# Patient Record
Sex: MALE | Race: WHITE | Employment: FULL TIME | URBAN - METROPOLITAN AREA
[De-identification: names, ages, dates, MRNs, and addresses within clinical notes are randomized per-mention and may not be internally consistent; named-entity substitution may affect disease eponyms.]

---

## 2017-01-03 RX ORDER — HYDROCODONE BITARTRATE AND ACETAMINOPHEN 7.5; 325 MG/1; MG/1
TABLET ORAL
Qty: 180 TABLET | Refills: 0 | Status: SHIPPED | OUTPATIENT
Start: 2017-01-03 | End: 2017-02-02

## 2017-01-03 NOTE — TELEPHONE ENCOUNTER
Signed rx placed at reception desk for pickup. Detailed message left for pt on cell (ok per consent). Pt will  rx.

## 2017-01-03 NOTE — TELEPHONE ENCOUNTER
Last OV 12/6/16, Future Appointments  Date Time Provider Shaggy Pintoisti   1/4/2017 1:40 PM DO KARAN Melvin       Last rx given 12/6/16

## 2017-01-04 ENCOUNTER — OFFICE VISIT (OUTPATIENT)
Dept: NEUROLOGY | Facility: CLINIC | Age: 57
End: 2017-01-04

## 2017-01-04 VITALS
HEIGHT: 73 IN | SYSTOLIC BLOOD PRESSURE: 150 MMHG | DIASTOLIC BLOOD PRESSURE: 70 MMHG | WEIGHT: 242 LBS | BODY MASS INDEX: 32.07 KG/M2 | RESPIRATION RATE: 20 BRPM | HEART RATE: 100 BPM

## 2017-01-04 DIAGNOSIS — G43.519 INTRACTABLE PERSISTENT MIGRAINE AURA WITHOUT CEREBRAL INFARCTION AND WITHOUT STATUS MIGRAINOSUS: Primary | ICD-10-CM

## 2017-01-04 DIAGNOSIS — E34.8 PINEAL GLAND CYST: ICD-10-CM

## 2017-01-04 PROCEDURE — 99244 OFF/OP CNSLTJ NEW/EST MOD 40: CPT | Performed by: OTHER

## 2017-01-04 PROCEDURE — 96372 THER/PROPH/DIAG INJ SC/IM: CPT | Performed by: OTHER

## 2017-01-04 RX ORDER — KETOROLAC TROMETHAMINE 30 MG/ML
30 INJECTION, SOLUTION INTRAMUSCULAR; INTRAVENOUS ONCE
Status: COMPLETED | OUTPATIENT
Start: 2017-01-04 | End: 2017-01-04

## 2017-01-04 RX ORDER — DEXAMETHASONE SODIUM PHOSPHATE 10 MG/ML
10 INJECTION, SOLUTION INTRAMUSCULAR; INTRAVENOUS ONCE
Status: COMPLETED | OUTPATIENT
Start: 2017-01-04 | End: 2017-01-04

## 2017-01-04 RX ADMIN — DEXAMETHASONE SODIUM PHOSPHATE 10 MG: 10 INJECTION, SOLUTION INTRAMUSCULAR; INTRAVENOUS at 14:56:00

## 2017-01-04 RX ADMIN — KETOROLAC TROMETHAMINE 30 MG: 30 INJECTION, SOLUTION INTRAMUSCULAR; INTRAVENOUS at 14:57:00

## 2017-01-04 NOTE — PROGRESS NOTES
Neurology H&P    True Stephanie Patient Status:  No patient class for patient encounter    3/12/1960 MRN [id number]   Location [address] [facility] Attending No att. providers found   [facility] Day #  PCP Cheryl Irizarry DO     Subjective:  True Stephanie is a Take 1 tablet (350 mg total) by mouth 4 (four) times daily as needed for Muscle spasms.  Disp: 120 tablet Rfl: 0   TOPIRAMATE 50 MG Oral Tab Take 3 tablets by mouth  twice a day (Patient taking differently: Take 4 tablets by mouth  twice a day) Disp: 540 ta BLOCK;  Surgeon: Ellis Pinzon MD;  [facility] [address] [facility]      HERNIA SURGERY      OTHER      Comment left knee     OTHER      Comment left ankle     OTHER      Comment vocal cord     CARPAL TUNNEL RELEASE Bilateral sounds    Neurologic:   MENTAL STATUS: alert, ox3, normal attention, language and fund of knowledge.       CRANIAL NERVES II to XII: PERRLA, no ptosis or diplopia, EOM intact, facial sensation intact, strong eye closure and lower facial muscles & jaw muscle PHOSPHATASE       U/L 74   AST (SGOT)      15-41 U/L 31   ALT (SGPT)      17-63 U/L 46   Total Bilirubin      0.1-2.0 mg/dL 0.4   TOTAL PROTEIN      6.1-8.3 g/dL 7.1   Albumin      3.5-4.8 g/dL 3.8   Sodium      136-144 mmol/L 136   Potassium      3. every 4-6 hours for pain. Yesterday he took 10 Fioricet, 6j687ra Naproxen and 6 Norco for pain control. I cautioned him strongly against medication overuse.  He should see me again in 1 month and we will discuss medication adjustment or starting a new daily

## 2017-01-04 NOTE — PATIENT INSTRUCTIONS
Refill policies:    • Allow 2 business days for refills; controlled substances may take longer.   • Contact your pharmacy at least 5 days prior to running out of medication and have them send an electronic request or submit request through the “request re your physician has recommended that you have a procedure or additional testing performed. [facility] will contact your insurance carrier to obtain pre-certification or prior authorization.     Unfortunately, JOEL has seen an increas

## 2017-01-10 ENCOUNTER — OFFICE VISIT (OUTPATIENT)
Dept: SURGERY | Facility: CLINIC | Age: 57
End: 2017-01-10

## 2017-01-10 VITALS — DIASTOLIC BLOOD PRESSURE: 108 MMHG | HEART RATE: 64 BPM | RESPIRATION RATE: 18 BRPM | SYSTOLIC BLOOD PRESSURE: 154 MMHG

## 2017-01-10 DIAGNOSIS — E34.8 PINEAL GLAND CYST: Primary | ICD-10-CM

## 2017-01-10 DIAGNOSIS — R51.9 CHRONIC INTRACTABLE HEADACHE, UNSPECIFIED HEADACHE TYPE: ICD-10-CM

## 2017-01-10 DIAGNOSIS — G89.29 CHRONIC INTRACTABLE HEADACHE, UNSPECIFIED HEADACHE TYPE: ICD-10-CM

## 2017-01-10 PROCEDURE — 99204 OFFICE O/P NEW MOD 45 MIN: CPT | Performed by: NURSE PRACTITIONER

## 2017-01-10 NOTE — PATIENT INSTRUCTIONS
Refill policies:    • Allow 2 business days for refills; controlled substances may take longer.   • Contact your pharmacy at least 5 days prior to running out of medication and have them send an electronic request or submit request through the “request re your physician has recommended that you have a procedure or additional testing performed. [facility] will contact your insurance carrier to obtain pre-certification or prior authorization.     Unfortunately, JOEL has seen an increas

## 2017-01-10 NOTE — PROGRESS NOTES
Patient c/o really bad headaches for the past 20 years. Headaches on and off. Started to get bad headaches again about 1 year ago. Topamax helped, but headaches returned again. Patient completed MRI recently.  Patient stated he had an MRI done at [facility]

## 2017-01-10 NOTE — H&P
Neurosurgery Clinic Visit  1/10/2017    Jay Jay Collazo PCP:  Bibi Zaidi DO    3/12/1960 MRN [id number]       Chief Complaint:  Patient presents with:  Neurologic Problem: NP- pineal gland cyst      HISTORY OF PRESENT ILLNESS:  Jay Jay Collazo is a(n) pain since surgery which is severe and constant. Following his lumbar spine surgery he did develop CSF leak postop and required ×3 blood patch. He reports his current headache being similar to his dural leak headache.   He is taking multiple medication fo Diverticulosis of colon (without mention of hemorrhage)    • Anesthesia complication      wakes up combative, violent according to patient   • Migraines    • Hyperlipidemia          Past Surgical History    BACK SURGERY      INJ PARAVERT F JNT L/S 1 LEV  3 Visual fields are full. Face is symmetrical. Tongue is midline. Uvula and palate elevate symmetrically. Shrug shoulders normally bilaterally. Cranial nerves II-XII are grossly intact. Facial sensation intact. Pronator drift negative.  Finger to nose maxillary sinus mucosal thickening. Otherwise, the visualized paranasal sinuses and mastoid air cells are unremarkable.  The expected major intracranial flow voids are present.      =====  CONCLUSION:     1. No acute intracranial abnormality.   2. Mild scat

## 2017-01-11 ENCOUNTER — TELEPHONE (OUTPATIENT)
Dept: SURGERY | Facility: CLINIC | Age: 57
End: 2017-01-11

## 2017-01-11 RX ORDER — BUTALBITAL, ASPIRIN, AND CAFFEINE 50; 325; 40 MG/1; MG/1; MG/1
CAPSULE ORAL
Qty: 45 CAPSULE | Refills: 0 | Status: SHIPPED | OUTPATIENT
Start: 2017-01-11 | End: 2017-01-25

## 2017-01-11 NOTE — TELEPHONE ENCOUNTER
KING TCB; regarding case discussion with Dr. Mark Gloria. Dr. Mark Gloria would like to see him in the office. No additional imaging at this time. We will determine an office visit. You please get him in ASAP.

## 2017-01-11 NOTE — TELEPHONE ENCOUNTER
Last OV 12/6/16, Future Appointments  Date Time Provider Shaggy Harmon   2/15/2017 2:00 PM Rawland Gitelman, DO [place] [facility] Susannah Bowens       Last rx given 12/23/16

## 2017-01-16 ENCOUNTER — OFFICE VISIT (OUTPATIENT)
Dept: SURGERY | Facility: CLINIC | Age: 57
End: 2017-01-16

## 2017-01-16 VITALS — HEART RATE: 64 BPM | RESPIRATION RATE: 18 BRPM | DIASTOLIC BLOOD PRESSURE: 88 MMHG | SYSTOLIC BLOOD PRESSURE: 144 MMHG

## 2017-01-16 DIAGNOSIS — E34.8 PINEAL GLAND CYST: Primary | ICD-10-CM

## 2017-01-16 PROCEDURE — 99214 OFFICE O/P EST MOD 30 MIN: CPT | Performed by: NEUROLOGICAL SURGERY

## 2017-01-16 NOTE — PATIENT INSTRUCTIONS
Refill policies:    • Allow 2 business days for refills; controlled substances may take longer.   • Contact your pharmacy at least 5 days prior to running out of medication and have them send an electronic request or submit request through the “request re your physician has recommended that you have a procedure or additional testing performed. [facility] will contact your insurance carrier to obtain pre-certification or prior authorization.     Unfortunately, JOEL has seen an increas

## 2017-01-16 NOTE — PROGRESS NOTES
Patient here to discuss MRI and treatment options.  Patient was recently seen on 1/10/17 with JAZ Lynn and it was determined patient has a vascular component to his pineal cyst. Patient states migraine is doing better since he was last seen, but still

## 2017-01-26 RX ORDER — BUTALBITAL, ASPIRIN, AND CAFFEINE 50; 325; 40 MG/1; MG/1; MG/1
CAPSULE ORAL
Qty: 45 CAPSULE | Refills: 0 | Status: SHIPPED | OUTPATIENT
Start: 2017-01-26 | End: 2017-02-06

## 2017-02-02 RX ORDER — HYDROCODONE BITARTRATE AND ACETAMINOPHEN 7.5; 325 MG/1; MG/1
TABLET ORAL
Qty: 180 TABLET | Refills: 0 | Status: SHIPPED | OUTPATIENT
Start: 2017-02-02 | End: 2017-03-02

## 2017-02-02 NOTE — TELEPHONE ENCOUNTER
Last OV 12/6/16, Future Appointments  Date Time Provider Shaggy Harmon   2/13/2017 3:00 PM Shiela Goodpasture, DO [facility] [place]   2/15/2017 2:00 PM DO [place] [facility] Ilana Carreon       Last rx given 1/3/17

## 2017-02-06 RX ORDER — BUTALBITAL, ASPIRIN, AND CAFFEINE 50; 325; 40 MG/1; MG/1; MG/1
CAPSULE ORAL
Qty: 45 CAPSULE | Refills: 0 | Status: SHIPPED | OUTPATIENT
Start: 2017-02-06 | End: 2017-02-25

## 2017-02-06 NOTE — TELEPHONE ENCOUNTER
Last OV 12/6/16, Future Appointments  Date Time Provider Shaggy Harmon   2/13/2017 3:00 PM Caterina Orantes DO [facility] CARE AT [facility] [place]   2/15/2017 2:00 PM DO [place] [facility] Meredith Curtis       Last rx given 1/26/17

## 2017-02-13 ENCOUNTER — OFFICE VISIT (OUTPATIENT)
Dept: SURGERY | Facility: CLINIC | Age: 57
End: 2017-02-13

## 2017-02-13 VITALS — HEART RATE: 72 BPM | RESPIRATION RATE: 14 BRPM | SYSTOLIC BLOOD PRESSURE: 140 MMHG | DIASTOLIC BLOOD PRESSURE: 80 MMHG

## 2017-02-13 DIAGNOSIS — E34.8 PINEAL GLAND CYST: Primary | ICD-10-CM

## 2017-02-13 DIAGNOSIS — R51.9 GENERALIZED HEADACHES: ICD-10-CM

## 2017-02-13 PROCEDURE — 99215 OFFICE O/P EST HI 40 MIN: CPT | Performed by: NEUROLOGICAL SURGERY

## 2017-02-13 NOTE — PATIENT INSTRUCTIONS
Refill policies:    • Allow 2 business days for refills; controlled substances may take longer.   • Contact your pharmacy at least 5 days prior to running out of medication and have them send an electronic request or submit request through the “request re your physician has recommended that you have a procedure or additional testing performed. [facility] will contact your insurance carrier to obtain pre-certification or prior authorization.     Unfortunately, JOEL has seen an increas

## 2017-02-14 ENCOUNTER — TELEPHONE (OUTPATIENT)
Dept: NEUROLOGY | Facility: CLINIC | Age: 57
End: 2017-02-14

## 2017-02-14 NOTE — PROGRESS NOTES
[facility]   Outpatient Neurological Surgery Follow Up    Madelin Mims  : 3/12/1960  2015  PCP: Nicky Mann DO  Referring Provider: No ref. provider found    REASON FOR VISIT:Patient presents with:   Other: follow up for cy semi-centrum ovale. There is no compression of the sylvian aqueduct. No sign of obstructive hydrocephalus.     ASSESSMENT:  (E34.8) Pineal gland cyst  (primary encounter diagnosis)  Plan: MRI PINEAL (W+WO) (CPT=70553)            (R51) Generalized headache

## 2017-02-14 NOTE — TELEPHONE ENCOUNTER
Left message at home number, [address] code [id number] ordered by [facility] has been approved by your insurance, authorization # [id number] and is approved through 02/14/17 to 03/15/17. Please have procedure completed before 03/15/17.  Schedule at your conveni

## 2017-02-15 ENCOUNTER — OFFICE VISIT (OUTPATIENT)
Dept: NEUROLOGY | Facility: CLINIC | Age: 57
End: 2017-02-15

## 2017-02-15 ENCOUNTER — TELEPHONE (OUTPATIENT)
Dept: SURGERY | Facility: CLINIC | Age: 57
End: 2017-02-15

## 2017-02-15 VITALS
HEART RATE: 64 BPM | BODY MASS INDEX: 32 KG/M2 | SYSTOLIC BLOOD PRESSURE: 138 MMHG | DIASTOLIC BLOOD PRESSURE: 88 MMHG | RESPIRATION RATE: 18 BRPM | WEIGHT: 243 LBS

## 2017-02-15 DIAGNOSIS — E34.8 PINEAL GLAND CYST: ICD-10-CM

## 2017-02-15 DIAGNOSIS — G43.519 INTRACTABLE PERSISTENT MIGRAINE AURA WITHOUT CEREBRAL INFARCTION AND WITHOUT STATUS MIGRAINOSUS: Primary | ICD-10-CM

## 2017-02-15 PROCEDURE — 99213 OFFICE O/P EST LOW 20 MIN: CPT | Performed by: OTHER

## 2017-02-15 RX ORDER — VERAPAMIL HYDROCHLORIDE 80 MG/1
TABLET ORAL
Qty: 270 TABLET | Refills: 0 | OUTPATIENT
Start: 2017-02-15

## 2017-02-15 RX ORDER — SUMATRIPTAN 25 MG/1
25 TABLET, FILM COATED ORAL EVERY 2 HOUR PRN
Qty: 9 TABLET | Refills: 0 | Status: SHIPPED | OUTPATIENT
Start: 2017-02-15 | End: 2017-03-14

## 2017-02-15 RX ORDER — VERAPAMIL HYDROCHLORIDE 80 MG/1
80 TABLET ORAL 3 TIMES DAILY
Qty: 90 TABLET | Refills: 0 | Status: SHIPPED | OUTPATIENT
Start: 2017-02-15 | End: 2017-03-14

## 2017-02-15 NOTE — PROGRESS NOTES
Neurology H&P    Rondi Cousins Patient Status:  No patient class for patient encounter    3/12/1960 MRN [id number]   Location [address] [facility] Attending No att. providers found   Hosp Day #  PCP Sandro Kimbrough DO     Subjective:  Initial Clinic HPI intensity. He still has almost daily headache. He states that he has stopped OTC migraine meds for headaches and that this may have helped with his daily headaches. He still takes 1000mg naproxen and 6 tabs Norco per day for back pain.  He has HA pain up to infarction and without status migrainosus      PMHx:  Past Medical History   Diagnosis Date   • OTHER DISEASES 1995     hepatitis drug induced   • Depression    • Neuropathy      left side   • Visual impairment      glasses   • Back problem    • Diverticul unexplained weight loss  Vision: no vision changes, no new blurry or double vision  Head and Neck: no eye or ear discharge  Pulmonary: no SOB, no new cough  CV: no chest pain, no new lower extremity swelling  GI: no constipation or abdominal pain  : ericka 34.1   RDW      11.5-16.0 % 13.2   RDW-SD      35.1-46.3 fL 45.5   Prelim Neutrophil Abs      1.30-6.70 x10 (3) uL 2.69   Neutrophils Absolute      1.30-6.70 x10(3) uL 2.69   Lymphocytes Absolute      0.90-4.00 x10(3) uL 1.39   Monocytes Absolute      0.10 HE has met with Dr. Funk in Neurosurgery for evaluation of his pineal cyst.           Plan:  1. Headache - Likely mixed migraine and medication overuse headache  - strongly urged to only take medications as directed.   - Limit OTC analgesics  - Continue To

## 2017-02-15 NOTE — PATIENT INSTRUCTIONS
Refill policies:    • Allow 2 business days for refills; controlled substances may take longer.   • Contact your pharmacy at least 5 days prior to running out of medication and have them send an electronic request or submit request through the “request re your physician has recommended that you have a procedure or additional testing performed. [facility] will contact your insurance carrier to obtain pre-certification or prior authorization.     Unfortunately, JOEL has seen an increas

## 2017-02-25 NOTE — TELEPHONE ENCOUNTER
Last OV 12/6/16, Future Appointments  Date Time Provider Shaggy Harmon   3/22/2017 2:40 PM DO ROXY Jesus       Last rx given 2/6/17 for # 39 with no refills

## 2017-02-27 RX ORDER — BUTALBITAL, ASPIRIN, AND CAFFEINE 50; 325; 40 MG/1; MG/1; MG/1
CAPSULE ORAL
Qty: 45 CAPSULE | Refills: 0 | Status: SHIPPED | OUTPATIENT
Start: 2017-02-27 | End: 2017-04-10

## 2017-03-02 RX ORDER — HYDROCODONE BITARTRATE AND ACETAMINOPHEN 7.5; 325 MG/1; MG/1
TABLET ORAL
Qty: 180 TABLET | Refills: 0 | Status: SHIPPED | OUTPATIENT
Start: 2017-03-02 | End: 2017-04-03

## 2017-03-13 ENCOUNTER — TELEPHONE (OUTPATIENT)
Dept: FAMILY MEDICINE CLINIC | Facility: CLINIC | Age: 57
End: 2017-03-13

## 2017-03-13 RX ORDER — TOPIRAMATE 50 MG/1
TABLET, FILM COATED ORAL
Qty: 540 TABLET | Refills: 3 | Status: SHIPPED | OUTPATIENT
Start: 2017-03-13 | End: 2017-08-29

## 2017-03-13 RX ORDER — DULOXETIN HYDROCHLORIDE 60 MG/1
CAPSULE, DELAYED RELEASE ORAL
Qty: 90 CAPSULE | Refills: 3 | Status: SHIPPED | OUTPATIENT
Start: 2017-03-13 | End: 2018-01-31

## 2017-03-13 RX ORDER — ROSUVASTATIN CALCIUM 10 MG/1
TABLET, COATED ORAL
Qty: 90 TABLET | Refills: 3 | Status: SHIPPED | OUTPATIENT
Start: 2017-03-13 | End: 2018-02-19

## 2017-03-13 NOTE — TELEPHONE ENCOUNTER
Last OV 12/6/16  Last labs 4/12/16  3 meds last refilled 10/20/16  90 day  0 refills    Patient is now seeing neuro

## 2017-03-13 NOTE — TELEPHONE ENCOUNTER
Last fill 2/27/17 #45 no refill  Called patient to clarify Rx request from [address] [place], [place] [address] for generic butalbital-aspirin- caffeine.   Patient states to disregard the Rx request  Request shredded

## 2017-03-15 RX ORDER — VERAPAMIL HYDROCHLORIDE 80 MG/1
TABLET ORAL
Qty: 90 TABLET | Refills: 0 | Status: SHIPPED | OUTPATIENT
Start: 2017-03-15 | End: 2017-04-20

## 2017-03-15 RX ORDER — SUMATRIPTAN 25 MG/1
TABLET, FILM COATED ORAL
Qty: 9 TABLET | Refills: 2 | Status: SHIPPED | OUTPATIENT
Start: 2017-03-15 | End: 2017-04-26

## 2017-03-15 NOTE — TELEPHONE ENCOUNTER
Medication: Sumatriptan    Date of last refill: 02/15/07  [place]     Last office visit: 2/15/2017  Due back to clinic per last office note:  TN in 6 weeks by 03/29/17  Date next office visit scheduled:  Future Appointme

## 2017-03-15 NOTE — TELEPHONE ENCOUNTER
Medication: Verapamil    Date of last refill: 02/15/07  [place]     Last office visit: 2/15/2017  Due back to clinic per last office note:  TN in 6 weeks by 03/29/17  Date next office visit scheduled:  Future Appointment

## 2017-03-16 RX ORDER — CARISOPRODOL 350 MG/1
350 TABLET ORAL 4 TIMES DAILY PRN
Qty: 120 TABLET | Refills: 0 | Status: SHIPPED
Start: 2017-03-16 | End: 2017-06-22

## 2017-03-16 NOTE — TELEPHONE ENCOUNTER
Spoke with the pt to confirm if he is still taking the soma as we received a request to refill this from [facility]  He states that yes he is still taking this and I asked him how he is taking this and he states 2 in the AM and @ 2 in the PM    [facility] 12/14/16

## 2017-04-03 ENCOUNTER — TELEPHONE (OUTPATIENT)
Dept: FAMILY MEDICINE CLINIC | Facility: CLINIC | Age: 57
End: 2017-04-03

## 2017-04-03 RX ORDER — HYDROCODONE BITARTRATE AND ACETAMINOPHEN 7.5; 325 MG/1; MG/1
TABLET ORAL
Qty: 180 TABLET | Refills: 0 | Status: SHIPPED | OUTPATIENT
Start: 2017-04-03 | End: 2017-04-29

## 2017-04-03 NOTE — TELEPHONE ENCOUNTER
Patient notified via detailed message. Patient instructed that if he is not the one who will be picking up he needs to call back and let our office know. Script placed in book.    Sent to  staff

## 2017-04-03 NOTE — TELEPHONE ENCOUNTER
LOV- 12/6/2016  Last refill-    hydrocodone-acetaminophen 7.5-325 MG Oral Tab 180 tablet 0 3/2/2017      Sig :  1-2 po qid

## 2017-04-10 RX ORDER — BUTALBITAL, ASPIRIN, AND CAFFEINE 50; 325; 40 MG/1; MG/1; MG/1
CAPSULE ORAL
Qty: 45 CAPSULE | Refills: 1 | Status: SHIPPED | OUTPATIENT
Start: 2017-04-10 | End: 2017-06-01

## 2017-04-20 NOTE — TELEPHONE ENCOUNTER
Medication: verapamil     Date of last refill: 3/15/17  [place]     Last office visit: 2/15/17  Due back to clinic per last office note:  6 weeks  Date next office visit scheduled:  4/26/17    Last OV note recommendation

## 2017-04-24 RX ORDER — VERAPAMIL HYDROCHLORIDE 80 MG/1
TABLET ORAL
Qty: 90 TABLET | Refills: 0 | Status: SHIPPED | OUTPATIENT
Start: 2017-04-24 | End: 2017-04-26

## 2017-04-24 NOTE — PROGRESS NOTES
[facility]   Outpatient Neurological Surgery Follow Up    Rebecca Nguyễn  : 3/12/1960  2017  PCP: Dangelo George DO  Referring Provider: No ref.  provider found    REASON FOR VISIT:Patient presents with:  Neurologic Problem: f/u minutes. % of time spent educating: Greater than 50% spent on counseling, education, and coordination of care. Howard Vazquez  2/72/20822:01 PM    Nicky Mann DO

## 2017-04-25 ENCOUNTER — HOSPITAL ENCOUNTER (OUTPATIENT)
Dept: MRI IMAGING | Age: 57
Discharge: HOME | End: 2017-04-25
Attending: NEUROLOGICAL SURGERY
Payer: COMMERCIAL

## 2017-04-25 ENCOUNTER — TELEPHONE (OUTPATIENT)
Dept: NEUROLOGY | Facility: CLINIC | Age: 57
End: 2017-04-25

## 2017-04-25 DIAGNOSIS — E34.8 PINEAL GLAND CYST: ICD-10-CM

## 2017-04-25 PROCEDURE — A9575 INJ GADOTERATE MEGLUMI 0.1ML: HCPCS | Performed by: NEUROLOGICAL SURGERY

## 2017-04-25 PROCEDURE — 70553 MRI BRAIN STEM W/O & W/DYE: CPT

## 2017-04-25 NOTE — TELEPHONE ENCOUNTER
Received refill request for Verapamil. Med was refilled to [facility] 4/24/17. LMTCB patient to see if he wants to switch refill to [facility].

## 2017-04-26 ENCOUNTER — OFFICE VISIT (OUTPATIENT)
Dept: NEUROLOGY | Facility: CLINIC | Age: 57
End: 2017-04-26

## 2017-04-26 VITALS — RESPIRATION RATE: 18 BRPM | SYSTOLIC BLOOD PRESSURE: 138 MMHG | HEART RATE: 60 BPM | DIASTOLIC BLOOD PRESSURE: 80 MMHG

## 2017-04-26 DIAGNOSIS — G43.709 CHRONIC MIGRAINE WITHOUT AURA WITHOUT STATUS MIGRAINOSUS, NOT INTRACTABLE: Primary | ICD-10-CM

## 2017-04-26 PROCEDURE — 99213 OFFICE O/P EST LOW 20 MIN: CPT | Performed by: OTHER

## 2017-04-26 RX ORDER — VERAPAMIL HYDROCHLORIDE 80 MG/1
80 TABLET ORAL 3 TIMES DAILY
Qty: 270 TABLET | Refills: 1 | Status: SHIPPED | OUTPATIENT
Start: 2017-04-26 | End: 2017-07-24

## 2017-04-26 RX ORDER — SUMATRIPTAN 50 MG/1
50 TABLET, FILM COATED ORAL EVERY 2 HOUR PRN
Qty: 12 TABLET | Refills: 0 | Status: SHIPPED | OUTPATIENT
Start: 2017-04-26 | End: 2017-08-09

## 2017-04-26 NOTE — PROGRESS NOTES
Neurology H&P    Rebecca Nguyễn Patient Status:  No patient class for patient encounter    3/12/1960 MRN [id number]   Location [address] [facility] Attending No att. providers found   Hosp Day #  PCP Dangelo George DO     Subjective:  Initial Clinic HPI without a headache. He feels that this may be attributed to weather or possibly hitting his head in the shower recently (no LOC). He would like to continue verapamil at this time. We did discuss Botox today.         Current Medications:    Current Outpatien status migrainosus      PMHx:  Past Medical History   Diagnosis Date   • OTHER DISEASES 1995     hepatitis drug induced   • Depression    • Neuropathy      left side   • Visual impairment      glasses   • Back problem    • Diverticulosis of colon (without loss  Vision: no vision changes, no new blurry or double vision  Head and Neck: no eye or ear discharge  Pulmonary: no SOB, no new cough  CV: no chest pain, no new lower extremity swelling  GI: no constipation or abdominal pain  : denies frequent urinati 48 y/o male with a PMH significant for low back pain and migraine type headache. His exam today is notable for LLE foot drop and leg extension weakness (pain mediated?).  He states that his migraines are much less frequent and intense at this time and he is

## 2017-04-26 NOTE — PROGRESS NOTES
Noticed improvement; however increasing migraines over the past couple weeks. Longest duration is 3 days, which is \"better than before I started seeing him. \" Patient would like to discuss Verapamil.

## 2017-04-26 NOTE — PATIENT INSTRUCTIONS
After your visit at the [place] office  today,  please direct any follow up questions or medication needs to the staff in our [place] office so that your concerns may be promptly addressed.   We are available through Eve or at the numbers below: approval may take 3-10 days. It is highly recommended patients contact their insurance carrier directly to determine coverage. If test is done without insurance authorization, patient may be responsible for the entire amount billed.       Precertification

## 2017-04-29 ENCOUNTER — NURSE ONLY (OUTPATIENT)
Dept: FAMILY MEDICINE CLINIC | Facility: CLINIC | Age: 57
End: 2017-04-29

## 2017-04-29 DIAGNOSIS — Z00.00 ROUTINE HEALTH MAINTENANCE: Primary | ICD-10-CM

## 2017-04-29 PROCEDURE — 84443 ASSAY THYROID STIM HORMONE: CPT | Performed by: FAMILY MEDICINE

## 2017-04-29 PROCEDURE — 80053 COMPREHEN METABOLIC PANEL: CPT | Performed by: FAMILY MEDICINE

## 2017-04-29 PROCEDURE — 84153 ASSAY OF PSA TOTAL: CPT | Performed by: FAMILY MEDICINE

## 2017-04-29 PROCEDURE — 80061 LIPID PANEL: CPT | Performed by: FAMILY MEDICINE

## 2017-04-29 PROCEDURE — 36415 COLL VENOUS BLD VENIPUNCTURE: CPT | Performed by: FAMILY MEDICINE

## 2017-04-29 NOTE — PROGRESS NOTES
Per 4/12/16 result note: \"recall in 1 year lipids/CMP/TSH/PSA\"    Labs ordered    Mint tube drawn right AC x 1 attempt

## 2017-05-01 RX ORDER — HYDROCODONE BITARTRATE AND ACETAMINOPHEN 7.5; 325 MG/1; MG/1
TABLET ORAL
Qty: 180 TABLET | Refills: 0 | Status: SHIPPED | OUTPATIENT
Start: 2017-05-01 | End: 2017-05-30

## 2017-05-05 ENCOUNTER — TELEPHONE (OUTPATIENT)
Dept: FAMILY MEDICINE CLINIC | Facility: CLINIC | Age: 57
End: 2017-05-05

## 2017-05-05 NOTE — TELEPHONE ENCOUNTER
Called and spoke to patient he states symptoms started Wednesday afternoon and has had diarrhea and vomiting since then. Patient states that the vomiting has subsided, but diarrhea has not, he states he is not able to keep anything in.    He states he is

## 2017-05-30 ENCOUNTER — TELEPHONE (OUTPATIENT)
Dept: FAMILY MEDICINE CLINIC | Facility: CLINIC | Age: 57
End: 2017-05-30

## 2017-05-30 DIAGNOSIS — R79.9 ABNORMAL BLOOD CHEMISTRY: Primary | ICD-10-CM

## 2017-05-30 RX ORDER — HYDROCODONE BITARTRATE AND ACETAMINOPHEN 7.5; 325 MG/1; MG/1
TABLET ORAL
Qty: 180 TABLET | Refills: 0 | Status: SHIPPED | OUTPATIENT
Start: 2017-05-30 | End: 2017-06-30

## 2017-05-30 NOTE — TELEPHONE ENCOUNTER
Last OV 12/6/16, Future Appointments  Date Time Provider Shaggy Harmon   6/24/2017 8:30 AM EM[facility] NURSE [facility] EMG Fernando Bones   8/9/2017 2:40 PM DO ROXY [place] EMG Fernando Bones       Last rx given 5/1/17

## 2017-05-30 NOTE — TELEPHONE ENCOUNTER
Pt states he was admitted to [facility] about 2 or 3 weeks ago for some type of food poisoning or contamination. Pt's liver and kidney levels were very elevated.  Pt was advised to follow up with you for repeat labs and to make sure everything was improvin

## 2017-05-30 NOTE — TELEPHONE ENCOUNTER
Pt states he would like to have levels repeated. Pt scheduled nurse visit for 6/24/17.     Future Appointments  Date Time Provider Shaggy Harmon   6/24/2017 8:30 AM EM[facility] NURSE [facility] EM[facility]   8/9/2017 2:40 PM Aleksey Lovett [facility]

## 2017-06-02 RX ORDER — BUTALBITAL, ASPIRIN, AND CAFFEINE 50; 325; 40 MG/1; MG/1; MG/1
CAPSULE ORAL
Qty: 45 CAPSULE | Refills: 1 | Status: SHIPPED | OUTPATIENT
Start: 2017-06-02 | End: 2017-07-13

## 2017-06-22 RX ORDER — CARISOPRODOL 350 MG/1
350 TABLET ORAL 4 TIMES DAILY PRN
Qty: 120 TABLET | Refills: 0 | Status: SHIPPED | OUTPATIENT
Start: 2017-06-22 | End: 2017-07-24

## 2017-06-22 NOTE — TELEPHONE ENCOUNTER
Last OV 12/6/16, Future Appointments  Date Time Provider Shaggy Harmon   6/24/2017 8:30 AM EMG Jigar Zurita NURSE [facility] EMG Margaret Cutting   8/9/2017 2:40 PM DO ROXY Gr[place] EM[facility] Margaret Cutting       Last rx given 3/16/17 for #120 with no refills

## 2017-06-24 ENCOUNTER — NURSE ONLY (OUTPATIENT)
Dept: FAMILY MEDICINE CLINIC | Facility: CLINIC | Age: 57
End: 2017-06-24

## 2017-06-24 DIAGNOSIS — R79.9 ABNORMAL BLOOD CHEMISTRY: ICD-10-CM

## 2017-06-24 PROCEDURE — 80053 COMPREHEN METABOLIC PANEL: CPT | Performed by: FAMILY MEDICINE

## 2017-06-24 PROCEDURE — 36415 COLL VENOUS BLD VENIPUNCTURE: CPT | Performed by: FAMILY MEDICINE

## 2017-06-26 ENCOUNTER — TELEPHONE (OUTPATIENT)
Dept: FAMILY MEDICINE CLINIC | Facility: CLINIC | Age: 57
End: 2017-06-26

## 2017-06-26 DIAGNOSIS — R79.9 ABNORMAL BLOOD CHEMISTRY: Primary | ICD-10-CM

## 2017-06-26 NOTE — TELEPHONE ENCOUNTER
----- Message from Magdi Nolasco DO sent at 6/24/2017  9:45 PM CDT -----  Can notify Lizeth Power his kidney and liver function sets look good let's recall in 6 months

## 2017-06-30 RX ORDER — HYDROCODONE BITARTRATE AND ACETAMINOPHEN 7.5; 325 MG/1; MG/1
TABLET ORAL
Qty: 180 TABLET | Refills: 0 | Status: SHIPPED | OUTPATIENT
Start: 2017-06-30 | End: 2017-07-28

## 2017-06-30 NOTE — TELEPHONE ENCOUNTER
Per conversation with pt on 6/26/17, he needs a refill of [place] today. He will be leaving to go out of town and will  rx sometime today.     Last OV 12/6/16, Future Appointments  Date Time Provider Shaggy Harmon   8/9/2017 2:40 PM Colin Mendez

## 2017-07-13 RX ORDER — BUTALBITAL, ASPIRIN, AND CAFFEINE 50; 325; 40 MG/1; MG/1; MG/1
CAPSULE ORAL
Qty: 45 CAPSULE | Refills: 0 | Status: SHIPPED
Start: 2017-07-13 | End: 2017-08-03

## 2017-07-20 ENCOUNTER — HOSPITAL ENCOUNTER (OUTPATIENT)
Dept: GENERAL RADIOLOGY | Age: 57
Discharge: HOME | End: 2017-07-20
Attending: FAMILY MEDICINE
Payer: COMMERCIAL

## 2017-07-20 ENCOUNTER — OFFICE VISIT (OUTPATIENT)
Dept: FAMILY MEDICINE CLINIC | Facility: CLINIC | Age: 57
End: 2017-07-20

## 2017-07-20 VITALS
HEART RATE: 68 BPM | DIASTOLIC BLOOD PRESSURE: 72 MMHG | BODY MASS INDEX: 32 KG/M2 | RESPIRATION RATE: 16 BRPM | WEIGHT: 243.63 LBS | TEMPERATURE: 98 F | SYSTOLIC BLOOD PRESSURE: 128 MMHG

## 2017-07-20 DIAGNOSIS — M47.816 LUMBAR SPONDYLOSIS: ICD-10-CM

## 2017-07-20 DIAGNOSIS — M48.061 LUMBAR SPINAL STENOSIS: ICD-10-CM

## 2017-07-20 DIAGNOSIS — M54.16 LUMBAR RADICULOPATHY: ICD-10-CM

## 2017-07-20 DIAGNOSIS — Z98.1 S/P LUMBAR SPINAL FUSION: ICD-10-CM

## 2017-07-20 DIAGNOSIS — M47.816 LUMBAR SPONDYLOSIS: Primary | ICD-10-CM

## 2017-07-20 PROCEDURE — 72110 X-RAY EXAM L-2 SPINE 4/>VWS: CPT | Performed by: FAMILY MEDICINE

## 2017-07-20 PROCEDURE — 99214 OFFICE O/P EST MOD 30 MIN: CPT | Performed by: FAMILY MEDICINE

## 2017-07-20 NOTE — PROGRESS NOTES
Harjit Starkey is a 56year old male. HPI:   Steffen went fishing and has had low back pain and quadricep pain, he noted pain in the mid lower lumbar region, and radiates into the front of his legs.  He has noted increased weakness in his left leg, he thinks side   • OTHER DISEASES 1995    hepatitis drug induced   • Visual impairment     glasses      Social History:  Smoking status: Former Smoker                                                              Packs/day: 1.00      Years: 5.00         Types: Lacey Martínez in after he gets an MRI.

## 2017-07-24 RX ORDER — VERAPAMIL HYDROCHLORIDE 80 MG/1
TABLET ORAL
Qty: 270 TABLET | Refills: 0 | Status: SHIPPED | OUTPATIENT
Start: 2017-07-24 | End: 2017-10-15

## 2017-07-24 RX ORDER — CARISOPRODOL 350 MG/1
350 TABLET ORAL 4 TIMES DAILY PRN
Qty: 120 TABLET | Refills: 0 | Status: SHIPPED | OUTPATIENT
Start: 2017-07-24 | End: 2017-09-19

## 2017-07-24 NOTE — TELEPHONE ENCOUNTER
Medication: Verapamil    Date of last refill: 04/26/17  [place]    Last office visit:04/26/17  Due back to clinic per last office note:  3 months  Date next office visit scheduled:  08/09/17    Last OV note recommend

## 2017-07-24 NOTE — TELEPHONE ENCOUNTER
Last OV 7/20/17, Future Appointments  Date Time Provider Shaggy Harmon   8/9/2017 2:40 PM DO ROXY [place] EM[facility] Micah Quiros       Last rx given 6/22/17 for #120 with no refills

## 2017-07-28 ENCOUNTER — TELEPHONE (OUTPATIENT)
Dept: FAMILY MEDICINE CLINIC | Facility: CLINIC | Age: 57
End: 2017-07-28

## 2017-07-28 RX ORDER — HYDROCODONE BITARTRATE AND ACETAMINOPHEN 7.5; 325 MG/1; MG/1
TABLET ORAL
Qty: 180 TABLET | Refills: 0 | Status: SHIPPED | OUTPATIENT
Start: 2017-07-28 | End: 2017-08-26

## 2017-07-28 NOTE — TELEPHONE ENCOUNTER
PT NEEDS REFILL OF hydrocodone-acetaminophen 7.5-325 MG Oral Tab  PLEASE CALL WHEN READY FOR P/U    THANK YOU

## 2017-07-28 NOTE — TELEPHONE ENCOUNTER
LOV  07/20/2017    Last refill    hydrocodone-acetaminophen 7.5-325 MG Oral Tab 180 tablet 0 6/30/2017    Sig :  1-2 po qid       Medication pending. Please advise.

## 2017-07-29 NOTE — TELEPHONE ENCOUNTER
Patient picked up script for [facility] 7.5-325 MG on 07/29/17. Loren LUNA # [id number].

## 2017-08-01 ENCOUNTER — HOSPITAL ENCOUNTER (OUTPATIENT)
Dept: MRI IMAGING | Age: 57
Discharge: HOME | End: 2017-08-01
Attending: FAMILY MEDICINE
Payer: COMMERCIAL

## 2017-08-01 DIAGNOSIS — M48.061 LUMBAR SPINAL STENOSIS: ICD-10-CM

## 2017-08-01 DIAGNOSIS — Z98.1 S/P LUMBAR SPINAL FUSION: ICD-10-CM

## 2017-08-01 DIAGNOSIS — M54.16 LUMBAR RADICULOPATHY: ICD-10-CM

## 2017-08-01 DIAGNOSIS — M47.816 LUMBAR SPONDYLOSIS: ICD-10-CM

## 2017-08-01 PROCEDURE — 72158 MRI LUMBAR SPINE W/O & W/DYE: CPT | Performed by: FAMILY MEDICINE

## 2017-08-01 PROCEDURE — A9575 INJ GADOTERATE MEGLUMI 0.1ML: HCPCS | Performed by: FAMILY MEDICINE

## 2017-08-02 DIAGNOSIS — N28.89 RENAL MASS, LEFT: Primary | ICD-10-CM

## 2017-08-03 RX ORDER — BUTALBITAL, ASPIRIN, AND CAFFEINE 50; 325; 40 MG/1; MG/1; MG/1
CAPSULE ORAL
Qty: 20 CAPSULE | Refills: 0 | Status: SHIPPED
Start: 2017-08-03 | End: 2017-08-25

## 2017-08-09 ENCOUNTER — OFFICE VISIT (OUTPATIENT)
Dept: NEUROLOGY | Facility: CLINIC | Age: 57
End: 2017-08-09

## 2017-08-09 VITALS — RESPIRATION RATE: 18 BRPM | HEART RATE: 72 BPM | DIASTOLIC BLOOD PRESSURE: 72 MMHG | SYSTOLIC BLOOD PRESSURE: 128 MMHG

## 2017-08-09 DIAGNOSIS — G43.709 CHRONIC MIGRAINE WITHOUT AURA WITHOUT STATUS MIGRAINOSUS, NOT INTRACTABLE: ICD-10-CM

## 2017-08-09 DIAGNOSIS — G43.009 MIGRAINE WITHOUT AURA AND WITHOUT STATUS MIGRAINOSUS, NOT INTRACTABLE: Primary | ICD-10-CM

## 2017-08-09 PROCEDURE — 99213 OFFICE O/P EST LOW 20 MIN: CPT | Performed by: OTHER

## 2017-08-09 RX ORDER — SUMATRIPTAN 100 MG/1
50 TABLET, FILM COATED ORAL EVERY 2 HOUR PRN
Qty: 9 TABLET | Refills: 2 | Status: SHIPPED | OUTPATIENT
Start: 2017-08-09 | End: 2018-03-21

## 2017-08-09 NOTE — PATIENT INSTRUCTIONS
Refill policies:    • Allow 2-3 business days for refills; controlled substances may take longer.   • Contact your pharmacy at least 5 days prior to running out of medication and have them send an electronic request or submit request through the [facility] have a procedure or additional testing performed. [facility]) will contact your insurance carrier to obtain pre-certification or prior authorization.     Unfortunately, JOEL has seen an increase in denial of payment even though the p

## 2017-08-09 NOTE — PROGRESS NOTES
Neurology H&P    Say Sergeant Patient Status:  No patient class for patient encounter    3/12/1960 MRN [id number]   Location ED [facility] Attending No att. providers found   Hosp Day #  PCP Virginia Cross DO     Subjective:  Initial Clinic HPI but nothnig severe. He states that overall he is very happy with his headache control.         Current Medications:    Current Outpatient Prescriptions:  BUTALBITAL-ASPIRIN-CAFFEINE -40 MG Oral Cap TAKE 1 OR 2 CAPSULES BY MOUTH AT ONSET, MAY REPEAT 4 wakes up combative, violent according to patient   • Back problem    • Depression    • Diverticulosis of colon (without mention of hemorrhage)    • Hyperlipidemia    • Migraines    • Neuropathy     left side   • OTHER DISEASES 1995    hepatitis drug ind unexplained weight loss  Vision: no vision changes, no new blurry or double vision  Head and Neck: no eye or ear discharge  Pulmonary: no SOB, no new cough  CV: no chest pain, no new lower extremity swelling  GI: no constipation or abdominal pain  : ericka headaches. 3. 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    Assessment: This is a 48 y/o male with a PMH significant for low back pain and migraine type headache. HE feels well today but has pain limiting weakness in the BLE.  He states that his headaches are much impr

## 2017-08-26 RX ORDER — HYDROCODONE BITARTRATE AND ACETAMINOPHEN 7.5; 325 MG/1; MG/1
TABLET ORAL
Qty: 180 TABLET | Refills: 0 | Status: SHIPPED | OUTPATIENT
Start: 2017-08-26 | End: 2017-09-25

## 2017-08-26 RX ORDER — BUTALBITAL, ASPIRIN, AND CAFFEINE 50; 325; 40 MG/1; MG/1; MG/1
CAPSULE ORAL
Qty: 20 CAPSULE | Refills: 0 | Status: SHIPPED | OUTPATIENT
Start: 2017-08-26 | End: 2017-09-15

## 2017-08-26 NOTE — TELEPHONE ENCOUNTER
PT CALLED AND NEEDS REFILL OF     hydrocodone-acetaminophen 7.5-325 MG Oral Tab    PLEASE CALL WHEN READY FOR P/U     THANK YOU

## 2017-08-28 ENCOUNTER — TELEPHONE (OUTPATIENT)
Dept: FAMILY MEDICINE CLINIC | Facility: CLINIC | Age: 57
End: 2017-08-28

## 2017-08-28 DIAGNOSIS — G43.709 CHRONIC MIGRAINE WITHOUT AURA WITHOUT STATUS MIGRAINOSUS, NOT INTRACTABLE: ICD-10-CM

## 2017-08-28 NOTE — TELEPHONE ENCOUNTER
TOPIRAMATE 50 MG Oral Tab 540 tablet 3 3/13/2017 6/11/2017    Sig: Take 3 tablets by mouth  twice a day    Patient taking differently: Take 4 tablets by mouth  twice a day          PATIENT IS OUT OF MEDICATION.  PLEASE CALL IN A TEMPORARY AMOUNT TO THE [facility]

## 2017-08-29 ENCOUNTER — TELEPHONE (OUTPATIENT)
Dept: FAMILY MEDICINE CLINIC | Facility: CLINIC | Age: 57
End: 2017-08-29

## 2017-08-29 RX ORDER — TOPIRAMATE 50 MG/1
TABLET, FILM COATED ORAL
Qty: 120 TABLET | Refills: 1 | Status: SHIPPED | OUTPATIENT
Start: 2017-08-29 | End: 2018-05-14

## 2017-08-29 RX ORDER — TOPIRAMATE 50 MG/1
TABLET, FILM COATED ORAL
Qty: 540 TABLET | Refills: 3 | Status: SHIPPED | OUTPATIENT
Start: 2017-08-29 | End: 2017-08-29

## 2017-08-29 RX ORDER — TOPIRAMATE 50 MG/1
TABLET, FILM COATED ORAL
Qty: 720 TABLET | Refills: 1 | OUTPATIENT
Start: 2017-08-29

## 2017-08-29 NOTE — TELEPHONE ENCOUNTER
90 day supply was sent to [place]. Pt needs 30 day only to [facility], then 90 day sent to [facility].

## 2017-09-18 RX ORDER — BUTALBITAL, ASPIRIN, AND CAFFEINE 50; 325; 40 MG/1; MG/1; MG/1
CAPSULE ORAL
Qty: 20 CAPSULE | Refills: 2 | Status: SHIPPED | OUTPATIENT
Start: 2017-09-18 | End: 2017-11-02

## 2017-09-19 RX ORDER — CARISOPRODOL 350 MG/1
350 TABLET ORAL 4 TIMES DAILY PRN
Qty: 120 TABLET | Refills: 0 | Status: SHIPPED | OUTPATIENT
Start: 2017-09-19 | End: 2017-10-16

## 2017-09-25 ENCOUNTER — TELEPHONE (OUTPATIENT)
Dept: FAMILY MEDICINE CLINIC | Facility: CLINIC | Age: 57
End: 2017-09-25

## 2017-09-25 RX ORDER — HYDROCODONE BITARTRATE AND ACETAMINOPHEN 7.5; 325 MG/1; MG/1
TABLET ORAL
Qty: 60 TABLET | Refills: 0 | Status: SHIPPED | OUTPATIENT
Start: 2017-09-25 | End: 2017-10-05

## 2017-09-25 NOTE — TELEPHONE ENCOUNTER
Pt called, needs refill on hydrocodone-acetaminophen 7.5-325 MG Oral Tab. Pt will  script.    Please call pt at [phone]

## 2017-10-05 RX ORDER — HYDROCODONE BITARTRATE AND ACETAMINOPHEN 7.5; 325 MG/1; MG/1
TABLET ORAL
Qty: 30 TABLET | Refills: 0 | Status: SHIPPED | OUTPATIENT
Start: 2017-10-05 | End: 2017-10-09

## 2017-10-05 NOTE — TELEPHONE ENCOUNTER
Pt called, needs a refill on hydrocodone-acetaminophen 7.5-325 MG Oral Tab. Pt will  script.    Please call pt at [phone]

## 2017-10-09 RX ORDER — HYDROCODONE BITARTRATE AND ACETAMINOPHEN 7.5; 325 MG/1; MG/1
TABLET ORAL
Qty: 180 TABLET | Refills: 0 | Status: SHIPPED | OUTPATIENT
Start: 2017-10-09 | End: 2017-11-06

## 2017-10-09 NOTE — TELEPHONE ENCOUNTER
Pt called, needs refill on hydrocodone-acetaminophen 7.5-325 MG Oral Tab. Pt will  script.    Please call pt at [phone]

## 2017-10-16 RX ORDER — CARISOPRODOL 350 MG/1
350 TABLET ORAL 4 TIMES DAILY PRN
Qty: 120 TABLET | Refills: 0 | Status: SHIPPED | OUTPATIENT
Start: 2017-10-16 | End: 2017-11-09

## 2017-10-16 RX ORDER — VERAPAMIL HYDROCHLORIDE 80 MG/1
TABLET ORAL
Qty: 270 TABLET | Refills: 0 | Status: SHIPPED | OUTPATIENT
Start: 2017-10-16 | End: 2018-04-09

## 2017-10-16 NOTE — TELEPHONE ENCOUNTER
Medication: Verapamil    Date of last refill: 07/24/17  [place]    Last office visit: 08/09/17  Due back to clinic per last office note:  6 months  Date next office visit scheduled:  No appointment scheduled at this

## 2017-11-02 RX ORDER — BUTALBITAL, ASPIRIN, AND CAFFEINE 50; 325; 40 MG/1; MG/1; MG/1
CAPSULE ORAL
Qty: 20 CAPSULE | Refills: 0 | Status: SHIPPED | OUTPATIENT
Start: 2017-11-02 | End: 2018-01-11

## 2017-11-06 ENCOUNTER — OFFICE VISIT (OUTPATIENT)
Dept: FAMILY MEDICINE CLINIC | Facility: CLINIC | Age: 57
End: 2017-11-06

## 2017-11-06 VITALS
HEIGHT: 71 IN | WEIGHT: 252.19 LBS | DIASTOLIC BLOOD PRESSURE: 90 MMHG | HEART RATE: 74 BPM | BODY MASS INDEX: 35.31 KG/M2 | TEMPERATURE: 98 F | SYSTOLIC BLOOD PRESSURE: 150 MMHG | RESPIRATION RATE: 14 BRPM

## 2017-11-06 DIAGNOSIS — Z98.1 S/P LUMBAR SPINAL FUSION: ICD-10-CM

## 2017-11-06 DIAGNOSIS — M54.16 LUMBAR RADICULOPATHY: ICD-10-CM

## 2017-11-06 DIAGNOSIS — M48.061 SPINAL STENOSIS OF LUMBAR REGION WITHOUT NEUROGENIC CLAUDICATION: ICD-10-CM

## 2017-11-06 DIAGNOSIS — M47.816 LUMBAR SPONDYLOSIS: Primary | ICD-10-CM

## 2017-11-06 PROCEDURE — 99214 OFFICE O/P EST MOD 30 MIN: CPT | Performed by: FAMILY MEDICINE

## 2017-11-06 RX ORDER — HYDROCODONE BITARTRATE AND ACETAMINOPHEN 7.5; 325 MG/1; MG/1
TABLET ORAL
Qty: 180 TABLET | Refills: 0 | Status: SHIPPED | OUTPATIENT
Start: 2017-11-06 | End: 2017-12-04

## 2017-11-06 RX ORDER — DULOXETIN HYDROCHLORIDE 30 MG/1
30 CAPSULE, DELAYED RELEASE ORAL DAILY
Qty: 30 CAPSULE | Refills: 1 | Status: SHIPPED | OUTPATIENT
Start: 2017-11-06 | End: 2018-05-01 | Stop reason: DRUGHIGH

## 2017-11-06 NOTE — PROGRESS NOTES
Silvio Sebastian is a 56year old male.   HPI:   Michelle Arora is here for follow up his meds, no change in his meds, saw neurology recently , had labs earlier this year and overall looked good, he is asking about cutting back  On his Norco, but is having considerabl DISEASES 1995    hepatitis drug induced   • Visual impairment     glasses      Social History:  Smoking status: Former Smoker                                                              Packs/day: 1.00      Years: 5.00         Types: Cigarettes     Quit d is asked to return in 6 weeks after dose adjustment to 90 mg and see if that helps.

## 2017-11-07 RX ORDER — DULOXETIN HYDROCHLORIDE 30 MG/1
CAPSULE, DELAYED RELEASE ORAL
Qty: 90 CAPSULE | Refills: 1 | OUTPATIENT
Start: 2017-11-07

## 2017-11-09 RX ORDER — CARISOPRODOL 350 MG/1
350 TABLET ORAL 4 TIMES DAILY PRN
Qty: 120 TABLET | Refills: 0 | Status: SHIPPED | OUTPATIENT
Start: 2017-11-09 | End: 2017-12-05

## 2017-11-09 NOTE — TELEPHONE ENCOUNTER
Last OV 11/6/17, Future Appointments  Date Time Provider Shaggy Harmon   12/18/2017 6:00 PM uLcía Rowan, [facility] EMG Garr Bernheim       Last rx given 10/16/17 for #120

## 2017-12-04 NOTE — TELEPHONE ENCOUNTER
Pt called, needs refill on hydrocodone-acetaminophen 7.5-325 MG Oral Tab. Pt will be picking up script.    Please call pt at [phone]

## 2017-12-05 RX ORDER — HYDROCODONE BITARTRATE AND ACETAMINOPHEN 7.5; 325 MG/1; MG/1
TABLET ORAL
Qty: 180 TABLET | Refills: 0 | Status: SHIPPED | OUTPATIENT
Start: 2017-12-05 | End: 2018-01-03

## 2017-12-05 RX ORDER — CARISOPRODOL 350 MG/1
350 TABLET ORAL 4 TIMES DAILY PRN
Qty: 360 TABLET | Refills: 0 | Status: SHIPPED
Start: 2017-12-05 | End: 2018-02-22

## 2017-12-11 ENCOUNTER — OFFICE VISIT (OUTPATIENT)
Dept: FAMILY MEDICINE CLINIC | Facility: CLINIC | Age: 57
End: 2017-12-11

## 2017-12-11 VITALS
DIASTOLIC BLOOD PRESSURE: 82 MMHG | RESPIRATION RATE: 16 BRPM | BODY MASS INDEX: 34 KG/M2 | HEART RATE: 56 BPM | SYSTOLIC BLOOD PRESSURE: 142 MMHG | TEMPERATURE: 98 F | OXYGEN SATURATION: 98 % | WEIGHT: 246.63 LBS

## 2017-12-11 DIAGNOSIS — M47.816 LUMBAR SPONDYLOSIS: Primary | ICD-10-CM

## 2017-12-11 DIAGNOSIS — M48.061 SPINAL STENOSIS OF LUMBAR REGION WITHOUT NEUROGENIC CLAUDICATION: ICD-10-CM

## 2017-12-11 DIAGNOSIS — Z98.1 S/P LUMBAR SPINAL FUSION: ICD-10-CM

## 2017-12-11 DIAGNOSIS — K61.1 ABSCESS, PERIRECTAL: ICD-10-CM

## 2017-12-11 PROCEDURE — 99214 OFFICE O/P EST MOD 30 MIN: CPT | Performed by: FAMILY MEDICINE

## 2017-12-11 NOTE — PROGRESS NOTES
Romario Ba is a 56year old male. HPI:   Nellie Britton is here for follow up his meds, no change in his meds, he was taking, 60 mg of Cymbalta and we increased the dose to  90 mg noticed some improvement in  his pain, but not as much as he hoped.  But some imp mention of hemorrhage)    • Hyperlipidemia    • Migraines    • Neuropathy     left side   • OTHER DISEASES 1995    hepatitis drug induced   • Visual impairment     glasses      Social History:  Smoking status: Former Smoker mg

## 2018-01-03 RX ORDER — HYDROCODONE BITARTRATE AND ACETAMINOPHEN 7.5; 325 MG/1; MG/1
TABLET ORAL
Qty: 180 TABLET | Refills: 0 | Status: SHIPPED | OUTPATIENT
Start: 2018-01-03 | End: 2018-01-31

## 2018-01-03 NOTE — TELEPHONE ENCOUNTER
Pt advised that medication is ready to be picked up. Advised to please bring photo ID.     Bandar Velasquez, 01/03/18, 2:01 PM

## 2018-01-12 RX ORDER — BUTALBITAL, ASPIRIN, AND CAFFEINE 50; 325; 40 MG/1; MG/1; MG/1
CAPSULE ORAL
Qty: 20 CAPSULE | Refills: 0 | Status: SHIPPED | OUTPATIENT
Start: 2018-01-12 | End: 2018-02-09

## 2018-01-31 ENCOUNTER — TELEPHONE (OUTPATIENT)
Dept: FAMILY MEDICINE CLINIC | Facility: CLINIC | Age: 58
End: 2018-01-31

## 2018-01-31 DIAGNOSIS — G43.519 INTRACTABLE PERSISTENT MIGRAINE AURA WITHOUT CEREBRAL INFARCTION AND WITHOUT STATUS MIGRAINOSUS: Primary | ICD-10-CM

## 2018-01-31 RX ORDER — HYDROCODONE BITARTRATE AND ACETAMINOPHEN 7.5; 325 MG/1; MG/1
TABLET ORAL
Qty: 180 TABLET | Refills: 0 | Status: SHIPPED | OUTPATIENT
Start: 2018-01-31 | End: 2018-02-28

## 2018-01-31 RX ORDER — DULOXETIN HYDROCHLORIDE 60 MG/1
60 CAPSULE, DELAYED RELEASE ORAL 2 TIMES DAILY
Qty: 90 CAPSULE | Refills: 3 | Status: SHIPPED | OUTPATIENT
Start: 2018-01-31 | End: 2018-11-10

## 2018-01-31 RX ORDER — SUMATRIPTAN 100 MG/1
50 TABLET, FILM COATED ORAL EVERY 2 HOUR PRN
Qty: 9 TABLET | Refills: 0 | Status: SHIPPED | OUTPATIENT
Start: 2018-01-31 | End: 2018-03-02

## 2018-01-31 NOTE — TELEPHONE ENCOUNTER
PT CALLED AND ADV NEEDS REFILL OF     hydrocodone-acetaminophen 7.5-325 MG Oral Tab    (PLEASE CALL WHEN READY FOR P/U)    PT ALSO ADV THAT HE ADJUSTED THE   DULoxetine HCl 30 MG Oral Cap DR Particles    PT ADV THAT HE FEELS A LITTLE BIT BETTER TAKING 60AM

## 2018-01-31 NOTE — TELEPHONE ENCOUNTER
LOV: 12/11/17  Last Refill:  Norco 1/3/18 #180 0 RF  Duloxetine 60  3/13/17  #90 3 RF   Pt is taking 60mg AM and PM.  SIG on script adjusted to reflect BID.

## 2018-01-31 NOTE — TELEPHONE ENCOUNTER
Medication: Sumatriptan 100mg    Date of last refill: 8/9/17  (#9/2)  [place]    Last office visit: 8/9/2017  Due back to clinic per last office note:  6 months  Date next office visit scheduled:  No future appointme

## 2018-02-12 RX ORDER — BUTALBITAL, ASPIRIN, AND CAFFEINE 50; 325; 40 MG/1; MG/1; MG/1
CAPSULE ORAL
Qty: 20 CAPSULE | Refills: 0 | Status: SHIPPED | OUTPATIENT
Start: 2018-02-12 | End: 2018-03-19

## 2018-02-19 RX ORDER — CARISOPRODOL 350 MG/1
350 TABLET ORAL 4 TIMES DAILY PRN
Qty: 360 TABLET | Refills: 1 | Status: CANCELLED
Start: 2018-02-19 | End: 2018-05-20

## 2018-02-19 RX ORDER — VERAPAMIL HYDROCHLORIDE 80 MG/1
TABLET ORAL
Qty: 270 TABLET | Refills: 1 | OUTPATIENT
Start: 2018-02-19

## 2018-02-19 RX ORDER — NAPROXEN 500 MG/1
TABLET, DELAYED RELEASE ORAL
Qty: 180 TABLET | Refills: 1 | Status: SHIPPED | OUTPATIENT
Start: 2018-02-19 | End: 2019-05-18

## 2018-02-19 RX ORDER — ROSUVASTATIN CALCIUM 10 MG/1
TABLET, COATED ORAL
Qty: 90 TABLET | Refills: 1 | Status: SHIPPED | OUTPATIENT
Start: 2018-02-19 | End: 2018-07-21

## 2018-02-22 NOTE — TELEPHONE ENCOUNTER
LOV: 12/5/17  Last Refill:12/5/17  #360 0 RF      Pended for 1 month with 1 RF  No future appointments.

## 2018-02-23 RX ORDER — CARISOPRODOL 350 MG/1
350 TABLET ORAL 4 TIMES DAILY PRN
Qty: 120 TABLET | Refills: 1 | Status: SHIPPED | OUTPATIENT
Start: 2018-02-23 | End: 2018-05-01

## 2018-02-28 RX ORDER — HYDROCODONE BITARTRATE AND ACETAMINOPHEN 7.5; 325 MG/1; MG/1
TABLET ORAL
Qty: 180 TABLET | Refills: 0 | Status: SHIPPED | OUTPATIENT
Start: 2018-02-28 | End: 2018-03-26

## 2018-02-28 NOTE — TELEPHONE ENCOUNTER
PT GOING OUT OF TOWN, FATHER N LAW SICK.  CURRENT SCRIPT WILL  ON Saturday    PT NEEDS REFILL OF    hydrocodone-acetaminophen 7.5-325 MG Oral Tab    PLEASE CALL WHEN READY FOR P/U     THANK YOU

## 2018-02-28 NOTE — TELEPHONE ENCOUNTER
Pt advised that script is printed and waiting to be picked up .   Pt was advised the office is open until 6:30 this evening

## 2018-03-19 RX ORDER — BUTALBITAL, ASPIRIN, AND CAFFEINE 50; 325; 40 MG/1; MG/1; MG/1
CAPSULE ORAL
Qty: 20 CAPSULE | Refills: 0 | Status: SHIPPED
Start: 2018-03-19 | End: 2018-04-04

## 2018-03-19 NOTE — TELEPHONE ENCOUNTER
Last refilled 2/12/18 on for # 20 with 0 refills    Last seen on 12/11/17. No future appointments. Thank you.

## 2018-03-21 DIAGNOSIS — G43.709 CHRONIC MIGRAINE WITHOUT AURA WITHOUT STATUS MIGRAINOSUS, NOT INTRACTABLE: ICD-10-CM

## 2018-03-21 RX ORDER — SUMATRIPTAN 100 MG/1
50 TABLET, FILM COATED ORAL EVERY 2 HOUR PRN
Qty: 9 TABLET | Refills: 0 | Status: SHIPPED | OUTPATIENT
Start: 2018-03-21 | End: 2018-04-18

## 2018-03-21 NOTE — TELEPHONE ENCOUNTER
Left message on patient identified voicemail (ok with HIPPA consent) informing patient  Needs appointment for further refills.   .  Medication: Imitrex    Date of last refill: 8/9/17  [place]    Last office visit: 8/9/

## 2018-03-26 RX ORDER — HYDROCODONE BITARTRATE AND ACETAMINOPHEN 7.5; 325 MG/1; MG/1
TABLET ORAL
Qty: 180 TABLET | Refills: 0 | Status: SHIPPED | OUTPATIENT
Start: 2018-03-26 | End: 2018-04-26

## 2018-03-26 NOTE — TELEPHONE ENCOUNTER
PT CALLED AND ADV NEEDS REFILL OF MEDS    hydrocodone-acetaminophen 7.5-325 MG Oral Tab    PLEASE CALL WHEN READY FOR P/U     THANK YOU

## 2018-03-26 NOTE — TELEPHONE ENCOUNTER
LOV: 12/11/17   Last Refill:  2/28/18 #180 0 RF    Future Appointments  Date Time Provider Shaggy Harmon   5/1/2018 3:40 PM DO KARAN Barclay

## 2018-04-04 RX ORDER — BUTALBITAL, ASPIRIN, AND CAFFEINE 50; 325; 40 MG/1; MG/1; MG/1
CAPSULE ORAL
Qty: 20 CAPSULE | Refills: 0 | Status: SHIPPED | OUTPATIENT
Start: 2018-04-04 | End: 2018-04-18

## 2018-04-04 NOTE — TELEPHONE ENCOUNTER
LOV: 12/11/17  Last Refill: 3/19/18 #20 0 RF    Future Appointments  Date Time Provider Shaggy Harmon   5/1/2018 3:40 PM DO KARAN Easley

## 2018-04-10 NOTE — TELEPHONE ENCOUNTER
Medication: Verapamil 80mg     Date of last refill: 10/16/17  [place]     Last office visit: 8/9/17  Due back to clinic per last office note:  6 months  Date next office visit scheduled:  5/1/18    Last OV note recommend

## 2018-04-11 RX ORDER — VERAPAMIL HYDROCHLORIDE 80 MG/1
TABLET ORAL
Qty: 270 TABLET | Refills: 0 | Status: SHIPPED | OUTPATIENT
Start: 2018-04-11 | End: 2018-06-07

## 2018-04-18 DIAGNOSIS — G43.709 CHRONIC MIGRAINE WITHOUT AURA WITHOUT STATUS MIGRAINOSUS, NOT INTRACTABLE: ICD-10-CM

## 2018-04-19 RX ORDER — BUTALBITAL, ASPIRIN, AND CAFFEINE 50; 325; 40 MG/1; MG/1; MG/1
CAPSULE ORAL
Qty: 20 CAPSULE | Refills: 0 | Status: SHIPPED | OUTPATIENT
Start: 2018-04-19 | End: 2018-05-11

## 2018-04-19 RX ORDER — SUMATRIPTAN 100 MG/1
TABLET, FILM COATED ORAL
Qty: 9 TABLET | Refills: 3 | Status: SHIPPED | OUTPATIENT
Start: 2018-04-19 | End: 2018-05-19

## 2018-04-19 NOTE — TELEPHONE ENCOUNTER
Medication: Sumatriptan 100mg     Date of last refill: 3/21/18  [place]     Last office visit: 8/9/17  Due back to clinic per last office note:  6 months  Date next office visit scheduled:  5/1/18    Last OV note recomme

## 2018-04-19 NOTE — TELEPHONE ENCOUNTER
LOV: 12/11/17  Last Refill: 4/4/18  #20 0 RF    Future Appointments  Date Time Provider Shaggy Harmon   5/1/2018 3:40 PM Rawland Gitelman, DO [facility]

## 2018-04-26 RX ORDER — HYDROCODONE BITARTRATE AND ACETAMINOPHEN 7.5; 325 MG/1; MG/1
TABLET ORAL
Qty: 180 TABLET | Refills: 0 | Status: SHIPPED | OUTPATIENT
Start: 2018-04-26 | End: 2018-05-01 | Stop reason: ALTCHOICE

## 2018-04-26 NOTE — TELEPHONE ENCOUNTER
LOV: 12/11/17   Last Refill:  3/26/18 #180 0 RF    Future Appointments  Date Time Provider Shaggy Harmon   5/1/2018 3:40 PM DO KARAN Galvan

## 2018-05-01 ENCOUNTER — OFFICE VISIT (OUTPATIENT)
Dept: NEUROLOGY | Facility: CLINIC | Age: 58
End: 2018-05-01

## 2018-05-01 VITALS
HEART RATE: 84 BPM | HEIGHT: 73 IN | SYSTOLIC BLOOD PRESSURE: 148 MMHG | DIASTOLIC BLOOD PRESSURE: 84 MMHG | WEIGHT: 250 LBS | BODY MASS INDEX: 33.13 KG/M2 | RESPIRATION RATE: 18 BRPM

## 2018-05-01 DIAGNOSIS — G43.009 MIGRAINE WITHOUT AURA AND WITHOUT STATUS MIGRAINOSUS, NOT INTRACTABLE: Primary | ICD-10-CM

## 2018-05-01 PROCEDURE — 99213 OFFICE O/P EST LOW 20 MIN: CPT | Performed by: OTHER

## 2018-05-01 PROCEDURE — 96372 THER/PROPH/DIAG INJ SC/IM: CPT | Performed by: OTHER

## 2018-05-01 RX ORDER — DEXAMETHASONE SODIUM PHOSPHATE 4 MG/ML
8 VIAL (ML) INJECTION ONCE
Status: DISCONTINUED | OUTPATIENT
Start: 2018-05-01 | End: 2018-05-01

## 2018-05-01 RX ORDER — DEXAMETHASONE SODIUM PHOSPHATE 4 MG/ML
10 VIAL (ML) INJECTION ONCE
Status: DISCONTINUED | OUTPATIENT
Start: 2018-05-01 | End: 2018-05-01

## 2018-05-01 RX ORDER — DEXAMETHASONE SODIUM PHOSPHATE 10 MG/ML
10 INJECTION, SOLUTION INTRAMUSCULAR; INTRAVENOUS ONCE
Status: COMPLETED | OUTPATIENT
Start: 2018-05-01 | End: 2018-05-01

## 2018-05-01 RX ORDER — CARISOPRODOL 350 MG/1
TABLET ORAL
Qty: 120 TABLET | Refills: 0 | Status: SHIPPED | OUTPATIENT
Start: 2018-05-01 | End: 2018-05-29

## 2018-05-01 RX ORDER — KETOROLAC TROMETHAMINE 30 MG/ML
30 INJECTION, SOLUTION INTRAMUSCULAR; INTRAVENOUS ONCE
Status: COMPLETED | OUTPATIENT
Start: 2018-05-01 | End: 2018-05-01

## 2018-05-01 RX ADMIN — DEXAMETHASONE SODIUM PHOSPHATE 10 MG: 10 INJECTION, SOLUTION INTRAMUSCULAR; INTRAVENOUS at 16:30:00

## 2018-05-01 RX ADMIN — KETOROLAC TROMETHAMINE 30 MG: 30 INJECTION, SOLUTION INTRAMUSCULAR; INTRAVENOUS at 16:31:00

## 2018-05-01 NOTE — PROGRESS NOTES
Patient here today for migraines. Patient stated that they were good until late February. Patient is on day 4 of the migraine.

## 2018-05-01 NOTE — PROGRESS NOTES
Neurology H&P    Jak Vieira Patient Status:  No patient class for patient encounter    3/12/1960 MRN [id number]   Location ED [facility] Attending No att. providers found   Hosp Day #  PCP Malika Wood DO     Subjective:  Initial Clinic HPI states that he has had a bad migraine for about 4 days now. He states that his pain has been \"bad enough to almost vomit. Like my head is going to explode\". He requests some sort of immediate headache intervention today such as toradol.          Current M arthroscopy     Intractable persistent migraine aura without cerebral infarction and without status migrainosus     Renal mass, left      PMHx:  Past Medical History:   Diagnosis Date   • Anesthesia complication     wakes up combative, violent according to • Hypertension Mother    • Lipids Mother      Hyperlipidemia   • Heart Disease Father    • Hypertension Father    • Lipids Father      Hyperlipidemia   • Diabetes Father        ROS:  Gen: no unexplained weight loss  Vision: no vision changes, no new blur and patchy T2 hyperintense signal within the periventricular and subcortical white matter likely represents mild chronic small vessel ischemic disease and/or the sequelae of migraine headaches. 3. 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    Assessment:   This is a 49

## 2018-05-01 NOTE — PATIENT INSTRUCTIONS
Refill policies:    • Allow 2-3 business days for refills; controlled substances may take longer.   • Contact your pharmacy at least 5 days prior to running out of medication and have them send an electronic request or submit request through the “request re entire amount billed. Precertification and Prior Authorizations: If your physician has recommended that you have a procedure or additional testing performed.   [facility]) will contact your insurance carrier to obtain pre-certi

## 2018-05-08 RX ORDER — TOPIRAMATE 50 MG/1
TABLET, FILM COATED ORAL
Qty: 720 TABLET | Refills: 1 | Status: SHIPPED | OUTPATIENT
Start: 2018-05-08 | End: 2018-11-08

## 2018-05-08 NOTE — TELEPHONE ENCOUNTER
Last refilled on 8/29/17 for # 120 with 1 rf. Last seen on 12/5/17. Requesting 90-day supply for mail order. Future Appointments  Date Time Provider Shaggy Harmon   6/12/2018 3:20 PM DO KARAN Bronson        Thank you.

## 2018-05-11 NOTE — TELEPHONE ENCOUNTER
Last refilled on 4/19/18 for # 20 with 0 refills  Last seen on 12/11/17  Future Appointments  Date Time Provider Shaggy Harmon   6/12/2018 3:20 PM DO KARAN Arroyo        Thank you.

## 2018-05-12 RX ORDER — BUTALBITAL, ASPIRIN, AND CAFFEINE 50; 325; 40 MG/1; MG/1; MG/1
CAPSULE ORAL
Qty: 20 CAPSULE | Refills: 0 | Status: SHIPPED
Start: 2018-05-12 | End: 2018-05-23

## 2018-05-14 ENCOUNTER — TELEPHONE (OUTPATIENT)
Dept: FAMILY MEDICINE CLINIC | Facility: CLINIC | Age: 58
End: 2018-05-14

## 2018-05-14 ENCOUNTER — OFFICE VISIT (OUTPATIENT)
Dept: FAMILY MEDICINE CLINIC | Facility: CLINIC | Age: 58
End: 2018-05-14

## 2018-05-14 VITALS
DIASTOLIC BLOOD PRESSURE: 92 MMHG | HEART RATE: 80 BPM | WEIGHT: 257.63 LBS | TEMPERATURE: 99 F | RESPIRATION RATE: 16 BRPM | BODY MASS INDEX: 34 KG/M2 | SYSTOLIC BLOOD PRESSURE: 132 MMHG

## 2018-05-14 DIAGNOSIS — Q64.4 URACHAL CYST: Primary | ICD-10-CM

## 2018-05-14 DIAGNOSIS — R19.8 UMBILICAL BLEEDING: ICD-10-CM

## 2018-05-14 DIAGNOSIS — G43.009 MIGRAINE WITHOUT AURA AND WITHOUT STATUS MIGRAINOSUS, NOT INTRACTABLE: ICD-10-CM

## 2018-05-14 PROCEDURE — 99214 OFFICE O/P EST MOD 30 MIN: CPT | Performed by: FAMILY MEDICINE

## 2018-05-14 RX ORDER — HYDROCODONE BITARTRATE AND ACETAMINOPHEN 7.5; 325 MG/1; MG/1
1 TABLET ORAL 4 TIMES DAILY PRN
COMMUNITY
End: 2018-05-25

## 2018-05-14 RX ORDER — AMOXICILLIN AND CLAVULANATE POTASSIUM 875; 125 MG/1; MG/1
1 TABLET, FILM COATED ORAL 2 TIMES DAILY
Qty: 20 TABLET | Refills: 0 | Status: SHIPPED | OUTPATIENT
Start: 2018-05-14 | End: 2018-05-24

## 2018-05-14 NOTE — TELEPHONE ENCOUNTER
Pt states he had the wrose migraine he has ever had yesterday. PT states it came out of nowhere. Pt states that after laying down for a while he rested his hand on his stomach and when he looked down he saw blood.  Pt states he got up to the bathroom to c

## 2018-05-14 NOTE — PROGRESS NOTES
Romario Ba is a 66year old male.   HPI:   Nellie Britton is here for evaluation of bloody drainage from his umbilicus that just started , at the same time he had a migraine flair, he has some very minor abdominal discomfort,it was bleeding and then today it is by mouth every evening.  Disp:  Rfl:       Past Medical History:   Diagnosis Date   • Anesthesia complication     wakes up combative, violent according to patient   • Back problem    • Depression    • Diverticulosis of colon (without mention of hemorrhage) hors for the migraine, talked about general precautiosn    Meds & Refills for this Visit:  Signed Prescriptions Disp Refills    Amoxicillin-Pot Clavulanate 875-125 MG Oral Tab 20 tablet 0      Sig: Take 1 tablet by mouth 2 (two) times daily.            Imag

## 2018-05-14 NOTE — TELEPHONE ENCOUNTER
Pt called, stated that he had such a severe migraine yesterday that he thinks it may have caused his navel to bleed. Pt set up an appointment for tomorrow and wanted a telephone note to go back to [address] [place] with this information.    Any questions please skyler

## 2018-05-23 NOTE — TELEPHONE ENCOUNTER
LOV: 5/14/18   Last Refill: 5/12/18  #20 0 RF    Future Appointments  Date Time Provider Shaggy Harmon   6/12/2018 3:20 PM DO KARAN Lares

## 2018-05-24 RX ORDER — BUTALBITAL, ASPIRIN, AND CAFFEINE 50; 325; 40 MG/1; MG/1; MG/1
CAPSULE ORAL
Qty: 20 CAPSULE | Refills: 0 | Status: SHIPPED | OUTPATIENT
Start: 2018-05-24 | End: 2018-06-07

## 2018-05-25 ENCOUNTER — TELEPHONE (OUTPATIENT)
Dept: FAMILY MEDICINE CLINIC | Facility: CLINIC | Age: 58
End: 2018-05-25

## 2018-05-25 DIAGNOSIS — R19.8 UMBILICAL BLEEDING: ICD-10-CM

## 2018-05-25 DIAGNOSIS — Q64.4 URACHAL CYST: ICD-10-CM

## 2018-05-25 DIAGNOSIS — G43.009 MIGRAINE WITHOUT AURA AND WITHOUT STATUS MIGRAINOSUS, NOT INTRACTABLE: ICD-10-CM

## 2018-05-25 RX ORDER — HYDROCODONE BITARTRATE AND ACETAMINOPHEN 7.5; 325 MG/1; MG/1
1 TABLET ORAL 4 TIMES DAILY PRN
Qty: 180 TABLET | Refills: 0 | Status: SHIPPED | OUTPATIENT
Start: 2018-05-25 | End: 2018-06-22

## 2018-05-25 NOTE — TELEPHONE ENCOUNTER
Pt needs a refill of the   hydrocodone-acetaminophen 7.5-325 MG Oral Tab    Please return call when ready to [phone]

## 2018-05-25 NOTE — TELEPHONE ENCOUNTER
LOV: 5/14/18  Last Refill: 4/26/18  180 0 RF    Future Appointments  Date Time Provider Shaggy Harmon   6/12/2018 3:20 PM DO KARAN Arroyo

## 2018-05-29 RX ORDER — CARISOPRODOL 350 MG/1
TABLET ORAL
Qty: 120 TABLET | Refills: 0 | OUTPATIENT
Start: 2018-05-29

## 2018-05-29 RX ORDER — CARISOPRODOL 350 MG/1
TABLET ORAL
Qty: 120 TABLET | Refills: 0 | Status: SHIPPED | OUTPATIENT
Start: 2018-05-29 | End: 2018-06-28

## 2018-05-29 NOTE — TELEPHONE ENCOUNTER
LOV: 5/14/18   Last Refill: 5/1/18 #120 0 RF    Future Appointments  Date Time Provider Shaggy Harmon   6/12/2018 3:20 PM DO KARAN Jesus

## 2018-06-07 DIAGNOSIS — G43.519 INTRACTABLE PERSISTENT MIGRAINE AURA WITHOUT CEREBRAL INFARCTION AND WITHOUT STATUS MIGRAINOSUS: Primary | ICD-10-CM

## 2018-06-07 RX ORDER — BUTALBITAL, ASPIRIN, AND CAFFEINE 50; 325; 40 MG/1; MG/1; MG/1
CAPSULE ORAL
Qty: 20 CAPSULE | Refills: 0 | Status: SHIPPED | OUTPATIENT
Start: 2018-06-07 | End: 2018-07-10

## 2018-06-07 NOTE — TELEPHONE ENCOUNTER
Pt states he made an appt with Dr. Sandra Lerner. He would really appreciate this refill. Thanks. Forward to Dr. Juan Johns to send refill.     Future Appointments  Date Time Provider Shaggy Harmon   6/12/2018 3:20 PM DO KARAN Melvin

## 2018-06-07 NOTE — TELEPHONE ENCOUNTER
Last refill: 5/24/2018 #20 with 0 refills  Last Visit: 5/14/2018  Next Visit: Future Appointments  Date Time Provider Shaggy Harmon   6/12/2018 3:20 PM DO KARAN Simpson         Forward to Dr. Zackery Delgado please advise on refills.  Joy Guevara

## 2018-06-07 NOTE — TELEPHONE ENCOUNTER
I can go ahead and fill this, but I think he needs to talk to his neurologist, Dr. Ryan Bahena about how often he still needs to take these, he might be a candidate for Botox, given these are tension headahces we are treating

## 2018-06-07 NOTE — TELEPHONE ENCOUNTER
Medication: verapamil    Date of last refill: 4/11/18  [place]    Last office visit: 5/1/18  Due back to clinic per last office note:  6 weeks  Date next office visit scheduled:  Future Appointments  Date Time Provide

## 2018-06-08 DIAGNOSIS — G43.519 INTRACTABLE PERSISTENT MIGRAINE AURA WITHOUT CEREBRAL INFARCTION AND WITHOUT STATUS MIGRAINOSUS: ICD-10-CM

## 2018-06-08 RX ORDER — VERAPAMIL HYDROCHLORIDE 80 MG/1
TABLET ORAL
Qty: 90 TABLET | Refills: 0 | Status: SHIPPED | OUTPATIENT
Start: 2018-06-08 | End: 2018-07-08

## 2018-06-08 RX ORDER — VERAPAMIL HYDROCHLORIDE 80 MG/1
TABLET ORAL
Qty: 270 TABLET | Refills: 0 | OUTPATIENT
Start: 2018-06-08

## 2018-06-08 RX ORDER — VERAPAMIL HYDROCHLORIDE 80 MG/1
TABLET ORAL
Qty: 90 TABLET | Refills: 0 | OUTPATIENT
Start: 2018-06-08

## 2018-06-08 NOTE — TELEPHONE ENCOUNTER
This is a 90-day supply request.  This is not appropriate at this time, as pt has upcoming appt and medications might be adjusted. Refused.

## 2018-06-12 ENCOUNTER — OFFICE VISIT (OUTPATIENT)
Dept: NEUROLOGY | Facility: CLINIC | Age: 58
End: 2018-06-12

## 2018-06-12 VITALS — DIASTOLIC BLOOD PRESSURE: 94 MMHG | HEART RATE: 60 BPM | SYSTOLIC BLOOD PRESSURE: 142 MMHG | RESPIRATION RATE: 18 BRPM

## 2018-06-12 DIAGNOSIS — G43.709 CHRONIC MIGRAINE WITHOUT AURA WITHOUT STATUS MIGRAINOSUS, NOT INTRACTABLE: ICD-10-CM

## 2018-06-12 PROCEDURE — 99213 OFFICE O/P EST LOW 20 MIN: CPT | Performed by: OTHER

## 2018-06-12 RX ORDER — SUMATRIPTAN 100 MG/1
50 TABLET, FILM COATED ORAL EVERY 2 HOUR PRN
COMMUNITY
End: 2018-08-13

## 2018-06-12 NOTE — PROGRESS NOTES
Neurology H&P    Lizzy Smith Patient Status:  No patient class for patient encounter    3/12/1960 MRN [id number]   Location [facility] [facility] Attending No att. providers found   Hosp Day #  PCP Bina Heller DO     Subjective:  Initial Clinic HPI he was having good relief form his Topamax and Verapamil in the past. He tells me that he is frustrated that he is having headache. He tells me that he has significant anxiety and stress in his life.  He feels that he is losing track of things due to his an Depression    • Diverticulosis of colon (without mention of hemorrhage)    • Hyperlipidemia    • Migraines    • Neuropathy     left side   • OTHER DISEASES 1995    hepatitis drug induced   • Visual impairment     glasses       PSHx:  Past Surgical History: Neck: no eye or ear discharge  Pulmonary: no SOB, no new cough  CV: no chest pain, no new lower extremity swelling  GI: no constipation or abdominal pain  : denies frequent urination or difficulty urinating   Heme: no new bruising, no unexplained fevers significant for low back pain and migraine type headache. He states that his headaches have been well controlled on Topamax and Verapamil in the past. He has had a more recent worsenign of his headaches.   HE does not want to change medications's or doses a

## 2018-06-12 NOTE — PATIENT INSTRUCTIONS
Refill policies:    • Allow 2-3 business days for refills; controlled substances may take longer.   • Contact your pharmacy at least 5 days prior to running out of medication and have them send an electronic request or submit request through the “request re entire amount billed. Precertification and Prior Authorizations: If your physician has recommended that you have a procedure or additional testing performed.   [facility]) will contact your insurance carrier to obtain pre-certi

## 2018-06-12 NOTE — PROGRESS NOTES
Patient f/u for migraines. Better than last office visit. Frequency a little less. Did have 2 really bad migraines since.

## 2018-06-22 DIAGNOSIS — R19.8 UMBILICAL BLEEDING: ICD-10-CM

## 2018-06-22 DIAGNOSIS — Q64.4 URACHAL CYST: ICD-10-CM

## 2018-06-22 DIAGNOSIS — G43.009 MIGRAINE WITHOUT AURA AND WITHOUT STATUS MIGRAINOSUS, NOT INTRACTABLE: ICD-10-CM

## 2018-06-22 NOTE — TELEPHONE ENCOUNTER
Patient stated he will be out of tablets by Monday. Was advised by  that Dr Herminia Russellw out until Monday. Patient stated was ok to wait for Dr Herminia Sexton.      Last refilled on 5/25/18 for # 180 with 0 refills  Last seen on 5/14/18  Future Appointments  Date

## 2018-06-22 NOTE — TELEPHONE ENCOUNTER
Pt called, needs refill on hydrocodone-acetaminophen 7.5-325 MG Oral Tab. Pt will be out by Monday. I informed pt that Dr. Lloyd Jacob is out of the office until Monday, he said that was fine. Pt will  script.    Please call pt at [phone]

## 2018-06-25 RX ORDER — HYDROCODONE BITARTRATE AND ACETAMINOPHEN 7.5; 325 MG/1; MG/1
1 TABLET ORAL 4 TIMES DAILY PRN
Qty: 180 TABLET | Refills: 0 | Status: SHIPPED | OUTPATIENT
Start: 2018-06-25 | End: 2018-06-26

## 2018-06-25 NOTE — TELEPHONE ENCOUNTER
Patient picked up script on 06/25/18, [facility] #  [phone]

## 2018-06-26 ENCOUNTER — TELEPHONE (OUTPATIENT)
Dept: FAMILY MEDICINE CLINIC | Facility: CLINIC | Age: 58
End: 2018-06-26

## 2018-06-26 DIAGNOSIS — G43.009 MIGRAINE WITHOUT AURA AND WITHOUT STATUS MIGRAINOSUS, NOT INTRACTABLE: ICD-10-CM

## 2018-06-26 DIAGNOSIS — R19.8 UMBILICAL BLEEDING: ICD-10-CM

## 2018-06-26 DIAGNOSIS — Q64.4 URACHAL CYST: ICD-10-CM

## 2018-06-26 RX ORDER — HYDROCODONE BITARTRATE AND ACETAMINOPHEN 7.5; 325 MG/1; MG/1
TABLET ORAL
Qty: 180 TABLET | Refills: 0 | Status: SHIPPED | OUTPATIENT
Start: 2018-06-26 | End: 2018-07-24

## 2018-06-26 NOTE — TELEPHONE ENCOUNTER
Roma advised that script says 1 tablet 4 times daily and quantity of script is 180. Needs corrected script. Should be 1 to 2 tablets 4 times daily, total of 180. . Call when ready for pickup.

## 2018-06-26 NOTE — TELEPHONE ENCOUNTER
Left message for the pt that the script is ready and to please bring the old script with him when he picks up the new one and we are here until 5 today.

## 2018-06-26 NOTE — TELEPHONE ENCOUNTER
Patient picked up new script on 06/26/18, [place] [facility] # [id number]. Patient also brought back old script. Script given to RONNELL Kahn.

## 2018-06-26 NOTE — TELEPHONE ENCOUNTER
Dr. Mcallister Fearing we need to change the prescription for the [address] [place] [address] for this patient. The sig and qty to be dispensed do not match.   We will need to provide a new script for the pt to take to the pharmacy

## 2018-06-28 RX ORDER — CARISOPRODOL 350 MG/1
TABLET ORAL
Qty: 120 TABLET | Refills: 0 | Status: SHIPPED
Start: 2018-06-28 | End: 2018-07-27

## 2018-06-28 NOTE — TELEPHONE ENCOUNTER
Last refilled on 5/29/18 for # 120 with 0 refills  Last seen on 5/14/18  Future Appointments  Date Time Provider Sahggy Harmon   7/2/2018 5:30 PM Allison Vaughn DO [facility]   8/13/2018 2:40 PM [facility]

## 2018-07-02 ENCOUNTER — HOSPITAL ENCOUNTER (OUTPATIENT)
Dept: CT IMAGING | Age: 58
Discharge: HOME | End: 2018-07-02
Attending: FAMILY MEDICINE
Payer: COMMERCIAL

## 2018-07-02 ENCOUNTER — OFFICE VISIT (OUTPATIENT)
Dept: FAMILY MEDICINE CLINIC | Facility: CLINIC | Age: 58
End: 2018-07-02

## 2018-07-02 VITALS
WEIGHT: 255 LBS | RESPIRATION RATE: 16 BRPM | DIASTOLIC BLOOD PRESSURE: 84 MMHG | TEMPERATURE: 98 F | BODY MASS INDEX: 34 KG/M2 | HEART RATE: 72 BPM | SYSTOLIC BLOOD PRESSURE: 130 MMHG

## 2018-07-02 DIAGNOSIS — R10.31 ABDOMINAL PAIN, RIGHT LOWER QUADRANT: Primary | ICD-10-CM

## 2018-07-02 DIAGNOSIS — R10.31 ABDOMINAL PAIN, RIGHT LOWER QUADRANT: ICD-10-CM

## 2018-07-02 DIAGNOSIS — R53.83 LETHARGY: ICD-10-CM

## 2018-07-02 DIAGNOSIS — K92.1 HEMATOCHEZIA: ICD-10-CM

## 2018-07-02 LAB — CREAT SERPL-MCNC: 1 MG/DL (ref 0.7–1.3)

## 2018-07-02 PROCEDURE — 99214 OFFICE O/P EST MOD 30 MIN: CPT | Performed by: FAMILY MEDICINE

## 2018-07-02 PROCEDURE — 74177 CT ABD & PELVIS W/CONTRAST: CPT | Performed by: FAMILY MEDICINE

## 2018-07-02 PROCEDURE — 82565 ASSAY OF CREATININE: CPT

## 2018-07-02 NOTE — PROGRESS NOTES
Candace Kruse is a 66year old male.   HPI:   Idris Mckeon is here for discussion of abdominal and blood in his stool, he as not been constipated at all since he had his GB taken  Out and since then has had diarrhea in spite of the pain meds he is on, has had rig History:  Smoking status: Former Smoker                                                              Packs/day: 1.00      Years: 5.00         Types: Cigarettes     Quit date: 3/24/2005  Smokeless tobacco: Former User                     Comment: social smo

## 2018-07-03 ENCOUNTER — TELEPHONE (OUTPATIENT)
Dept: FAMILY MEDICINE CLINIC | Facility: CLINIC | Age: 58
End: 2018-07-03

## 2018-07-03 NOTE — TELEPHONE ENCOUNTER
----- Message from Taylor Jarvis DO sent at 7/3/2018  8:47 AM CDT -----  Notified of results, he has an indirect hernia and I think that is the source of his pain, it does not look like it's incarcerated, I would like him to see Dr. Sandi Mckay for [employer]

## 2018-07-06 ENCOUNTER — HOSPITAL ENCOUNTER (OUTPATIENT)
Age: 58
Discharge: HOME | End: 2018-07-06
Attending: FAMILY MEDICINE
Payer: COMMERCIAL

## 2018-07-06 ENCOUNTER — APPOINTMENT (OUTPATIENT)
Dept: GENERAL RADIOLOGY | Age: 58
End: 2018-07-06
Attending: FAMILY MEDICINE
Payer: COMMERCIAL

## 2018-07-06 VITALS
HEART RATE: 65 BPM | BODY MASS INDEX: 32 KG/M2 | WEIGHT: 240 LBS | TEMPERATURE: 98 F | DIASTOLIC BLOOD PRESSURE: 82 MMHG | OXYGEN SATURATION: 98 % | SYSTOLIC BLOOD PRESSURE: 132 MMHG | RESPIRATION RATE: 16 BRPM

## 2018-07-06 DIAGNOSIS — S01.01XA LACERATION OF SCALP, INITIAL ENCOUNTER: ICD-10-CM

## 2018-07-06 DIAGNOSIS — T14.8XXA FOREIGN BODY IN SKIN OR SUBCUTANEOUS TISSUE: Primary | ICD-10-CM

## 2018-07-06 PROCEDURE — 12001 RPR S/N/AX/GEN/TRNK 2.5CM/<: CPT

## 2018-07-06 PROCEDURE — 99213 OFFICE O/P EST LOW 20 MIN: CPT

## 2018-07-06 PROCEDURE — 99204 OFFICE O/P NEW MOD 45 MIN: CPT

## 2018-07-06 PROCEDURE — 70250 X-RAY EXAM OF SKULL: CPT | Performed by: FAMILY MEDICINE

## 2018-07-06 RX ORDER — CEPHALEXIN 500 MG/1
500 CAPSULE ORAL EVERY 8 HOURS
Qty: 21 CAPSULE | Refills: 0 | Status: SHIPPED | OUTPATIENT
Start: 2018-07-06 | End: 2018-07-13

## 2018-07-07 NOTE — ED PROVIDER NOTES
[place] [address] [facility]    [place]   Patient presents with:  Laceration Abrasion (integumentary)  Headache (neurologic)    Stated Complaint: HEAD LACERATION- [address]    12year-old male comes in with concerns of Comment: Procedure: KNEE ARTHROSCOPY;  Surgeon:                Macario Cantu MD;  Location: [facility]  No date: OTHER      Comment: left knee   No date: OTHER      Comment: left ankle   No date: OTHER      Comment: vocal cord   No date: REMOVAL Eyes: Conjunctivae and EOM are normal. Pupils are equal, round, and reactive to light. Right eye exhibits no discharge. Left eye exhibits no discharge. Neck: Neck supple. Cardiovascular: Normal rate and regular rhythm.     Pulmonary/Chest: Effort normal He also has a superficial laceration just adjacent to the area which was repaired using Dermabond using aseptic precautions.     Advised to start taking antibiotic, side effects were discussed, advised taking over-the-counter probiotic for possible diarrhea

## 2018-07-08 DIAGNOSIS — G43.519 INTRACTABLE PERSISTENT MIGRAINE AURA WITHOUT CEREBRAL INFARCTION AND WITHOUT STATUS MIGRAINOSUS: ICD-10-CM

## 2018-07-10 RX ORDER — VERAPAMIL HYDROCHLORIDE 80 MG/1
TABLET ORAL
Qty: 90 TABLET | Refills: 1 | Status: SHIPPED | OUTPATIENT
Start: 2018-07-10 | End: 2018-08-13

## 2018-07-10 NOTE — TELEPHONE ENCOUNTER
Medication: verapamil 80mg      Date of last refill: 6/8/18  [place]     Last office visit: 6/12/18  Due back to clinic per last office note:  2 months  Date next office visit scheduled:  8/13/18    Last OV note recommen

## 2018-07-11 RX ORDER — BUTALBITAL, ASPIRIN, AND CAFFEINE 50; 325; 40 MG/1; MG/1; MG/1
CAPSULE ORAL
Qty: 20 CAPSULE | Refills: 0 | Status: SHIPPED
Start: 2018-07-11 | End: 2018-08-30

## 2018-07-18 ENCOUNTER — OFFICE VISIT (OUTPATIENT)
Dept: SURGERY | Facility: CLINIC | Age: 58
End: 2018-07-18
Payer: COMMERCIAL

## 2018-07-18 VITALS
SYSTOLIC BLOOD PRESSURE: 158 MMHG | TEMPERATURE: 98 F | HEIGHT: 73 IN | WEIGHT: 253 LBS | DIASTOLIC BLOOD PRESSURE: 84 MMHG | BODY MASS INDEX: 33.53 KG/M2 | HEART RATE: 40 BPM

## 2018-07-18 DIAGNOSIS — K40.90 INGUINAL HERNIA OF RIGHT SIDE WITHOUT OBSTRUCTION OR GANGRENE: Primary | ICD-10-CM

## 2018-07-18 PROCEDURE — 99204 OFFICE O/P NEW MOD 45 MIN: CPT | Performed by: SURGERY

## 2018-07-18 RX ORDER — POLYETHYLENE GLYCOL 3350, SODIUM CHLORIDE, SODIUM BICARBONATE, POTASSIUM CHLORIDE 420; 11.2; 5.72; 1.48 G/4L; G/4L; G/4L; G/4L
POWDER, FOR SOLUTION ORAL
Qty: 1 BOTTLE | Refills: 0 | Status: ON HOLD | OUTPATIENT
Start: 2018-07-18 | End: 2018-09-12 | Stop reason: ALTCHOICE

## 2018-07-18 NOTE — H&P
Daysi Portillo is a 66year old male  Patient presents with:  Hernia: indirect hernia consult referred by anne- review CT , pt c/o of severe abd pain, radiates to left low back x several months, pt c/o of bleeding from navel area x 3days, denies bleeding BRANCH NERVE BLOCK;  Surgeon: Suman Palafox MD;  Location: [address] [place] [address] MANAGEMENT  3/19/2012: Ric Smalls L/S 2 LEV      Comment: Procedure: LUMBAR FACET INJECTION OR MEDIAL                BRANCH NERVE BLOCK;  Surgeon: Filomena Bhandari Relation Age of Onset   • Heart Disease Mother    • Hypertension Mother    • Lipids Mother      Hyperlipidemia   • Heart Disease Father    • Hypertension Father    • Lipids Father      Hyperlipidemia   • Diabetes Father      Smoking status: Former Smoker quadrant positive right inguinal hernia  LYMPHATIC: no axillary , supraclavicular or inguinal lymphadenopathy  EXTREMITIES: no cyanosis, clubbing or edema, no atrophy, full ROM    STUDIES:     Hospital Outpatient Visit on 07/02/2018   Component Date Value

## 2018-07-23 RX ORDER — ROSUVASTATIN CALCIUM 10 MG/1
TABLET, COATED ORAL
Qty: 90 TABLET | Refills: 1 | Status: SHIPPED | OUTPATIENT
Start: 2018-07-23 | End: 2019-03-01

## 2018-07-23 NOTE — TELEPHONE ENCOUNTER
Last refilled on 2/19/18 for # 90 with 1 refills  Last lipids 4/29/17  Last seen on 7/2/18  Future Appointments  Date Time Provider Shaggy Harmon   8/13/2018 2:40 PM DO KARAN Leal        Thank you.

## 2018-07-24 DIAGNOSIS — R19.8 UMBILICAL BLEEDING: ICD-10-CM

## 2018-07-24 DIAGNOSIS — Q64.4 URACHAL CYST: ICD-10-CM

## 2018-07-24 DIAGNOSIS — G43.009 MIGRAINE WITHOUT AURA AND WITHOUT STATUS MIGRAINOSUS, NOT INTRACTABLE: ICD-10-CM

## 2018-07-24 RX ORDER — HYDROCODONE BITARTRATE AND ACETAMINOPHEN 7.5; 325 MG/1; MG/1
TABLET ORAL
Qty: 180 TABLET | Refills: 0 | Status: SHIPPED | OUTPATIENT
Start: 2018-07-24 | End: 2018-08-23

## 2018-07-24 NOTE — TELEPHONE ENCOUNTER
Patient needs a refill on his hydrocodone. He knows it's not due for refill until 7/26 but he \"just got home\" and wants to come in and get the script today. Please call back.

## 2018-07-25 ENCOUNTER — TELEPHONE (OUTPATIENT)
Dept: SURGERY | Facility: CLINIC | Age: 58
End: 2018-07-25

## 2018-07-25 NOTE — TELEPHONE ENCOUNTER
Pt is supposed to see hematology and urology pre-op. He thought that we were making this appt. Will speak with surgeon to see who he wants the pt to see and send him these results.

## 2018-07-26 ENCOUNTER — TELEPHONE (OUTPATIENT)
Dept: SURGERY | Facility: CLINIC | Age: 58
End: 2018-07-26

## 2018-07-26 NOTE — TELEPHONE ENCOUNTER
Left msg and faxed information for pt to see Dr. Fabian Albarado Urology and Dr. Porsche Lopez Hematology, pt does not need to get surgical clearance from these doctors just follow up with them. Also asked that pt call to schedule his hernia surgery.

## 2018-07-27 ENCOUNTER — TELEPHONE (OUTPATIENT)
Dept: FAMILY MEDICINE CLINIC | Facility: CLINIC | Age: 58
End: 2018-07-27

## 2018-07-27 NOTE — TELEPHONE ENCOUNTER
CPT:  [address] to see if prior Mearl Lowe is needed for screening colonoscopy. The fund office was closed. It stated to call back on the next business day.   7/27 at 211pm.

## 2018-07-28 NOTE — TELEPHONE ENCOUNTER
Last refilled on 6/28/18 for # 120 with 0 rf. Last seen on 7/2/18. Future Appointments  Date Time Provider Shaggy Harmon   8/13/2018 2:40 PM DO KARAN Jimenez        Thank you.

## 2018-07-30 ENCOUNTER — TELEPHONE (OUTPATIENT)
Dept: FAMILY MEDICINE CLINIC | Facility: CLINIC | Age: 58
End: 2018-07-30

## 2018-07-30 RX ORDER — CARISOPRODOL 350 MG/1
TABLET ORAL
Qty: 120 TABLET | Refills: 0 | Status: SHIPPED | OUTPATIENT
Start: 2018-07-30 | End: 2018-08-27

## 2018-07-30 NOTE — TELEPHONE ENCOUNTER
CPT Colon:  75964   DX CODE:  Z12.11  CPT Hernia:  48366, 19860, 73533  DX CODE: K40.90     Hernia being done at [facility] and it is in network. Colon being done at [facility] and it is in network.     I did not realize there was two s

## 2018-07-31 NOTE — TELEPHONE ENCOUNTER
Received fax today stating that the patient is certified for surgery on 8/22/18. The authorization number is [id number]. This came from Garland Carmen RN-utilization review nurse at [facility].   Phone:  [phone] and fax: [phone]

## 2018-08-07 ENCOUNTER — TELEPHONE (OUTPATIENT)
Dept: SURGERY | Facility: CLINIC | Age: 58
End: 2018-08-07

## 2018-08-07 DIAGNOSIS — K40.90 INGUINAL HERNIA WITHOUT OBSTRUCTION OR GANGRENE, RECURRENCE NOT SPECIFIED, UNSPECIFIED LATERALITY: Primary | ICD-10-CM

## 2018-08-08 ENCOUNTER — TELEPHONE (OUTPATIENT)
Dept: SURGERY | Facility: CLINIC | Age: 58
End: 2018-08-08

## 2018-08-08 DIAGNOSIS — K40.90 INGUINAL HERNIA WITHOUT OBSTRUCTION OR GANGRENE, RECURRENCE NOT SPECIFIED, UNSPECIFIED LATERALITY: Primary | ICD-10-CM

## 2018-08-10 ENCOUNTER — TELEPHONE (OUTPATIENT)
Dept: FAMILY MEDICINE CLINIC | Facility: CLINIC | Age: 58
End: 2018-08-10

## 2018-08-10 NOTE — TELEPHONE ENCOUNTER
CPT:  [id number], [id number], [id number]  DX:  K40.90      Calling [employer] pt was already pre certed for Right laparoscopic inguinal hernia repair with mesh, but rescheduled to 09/12/2018. I spoke with Tabatha Fofana. She states she just has to change the DOS.     She states it

## 2018-08-13 ENCOUNTER — OFFICE VISIT (OUTPATIENT)
Dept: NEUROLOGY | Facility: CLINIC | Age: 58
End: 2018-08-13
Payer: COMMERCIAL

## 2018-08-13 VITALS
BODY MASS INDEX: 32.2 KG/M2 | RESPIRATION RATE: 16 BRPM | WEIGHT: 243 LBS | DIASTOLIC BLOOD PRESSURE: 82 MMHG | HEIGHT: 73 IN | SYSTOLIC BLOOD PRESSURE: 146 MMHG | HEART RATE: 90 BPM

## 2018-08-13 DIAGNOSIS — G43.101 MIGRAINE WITH AURA AND WITH STATUS MIGRAINOSUS, NOT INTRACTABLE: Primary | ICD-10-CM

## 2018-08-13 DIAGNOSIS — G43.519 INTRACTABLE PERSISTENT MIGRAINE AURA WITHOUT CEREBRAL INFARCTION AND WITHOUT STATUS MIGRAINOSUS: ICD-10-CM

## 2018-08-13 PROCEDURE — 99213 OFFICE O/P EST LOW 20 MIN: CPT | Performed by: OTHER

## 2018-08-13 RX ORDER — VERAPAMIL HYDROCHLORIDE 80 MG/1
80 TABLET ORAL 3 TIMES DAILY
Qty: 270 TABLET | Refills: 1 | Status: SHIPPED | OUTPATIENT
Start: 2018-08-13 | End: 2019-01-06

## 2018-08-13 RX ORDER — SUMATRIPTAN 100 MG/1
100 TABLET, FILM COATED ORAL EVERY 2 HOUR PRN
Qty: 9 TABLET | Refills: 2 | Status: SHIPPED | OUTPATIENT
Start: 2018-08-13 | End: 2018-11-30

## 2018-08-13 RX ORDER — BUTALBITAL, ASPIRIN, AND CAFFEINE 50; 325; 40 MG/1; MG/1; MG/1
CAPSULE ORAL
Qty: 20 CAPSULE | Refills: 0 | Status: CANCELLED | OUTPATIENT
Start: 2018-08-13

## 2018-08-13 NOTE — PATIENT INSTRUCTIONS
Refill policies:    • Allow 2-3 business days for refills; controlled substances may take longer.   • Contact your pharmacy at least 5 days prior to running out of medication and have them send an electronic request or submit request through the “request re entire amount billed. Precertification and Prior Authorizations: If your physician has recommended that you have a procedure or additional testing performed.   [facility]) will contact your insurance carrier to obtain pre-certi

## 2018-08-13 NOTE — PROGRESS NOTES
Neurology H&P    Jonas Lowery Patient Status:  No patient class for patient encounter    3/12/1960 MRN [id number]   Location ED [facility] Attending No att. providers found   Hosp Day #  PCP Louise De Paz DO     Subjective:  Initial Clinic HPI sumatriptan 100mg in June and 2 this month in August. He reports that this seems to be working very well to help keep migraines at [facility]. He continue to take Topamax 200mg BID and verapamil 80mg TID for migraine prophylaxis.  He states that he is currently ve persistent migraine aura without cerebral infarction and without status migrainosus     Renal mass, left     Omphalitis      PMHx:  Past Medical History:   Diagnosis Date   • Anesthesia complication     wakes up combative, violent according to patient   • rare      Family History:  Family History   Problem Relation Age of Onset   • Heart Disease Mother    • Hypertension Mother    • Lipids Mother      Hyperlipidemia   • Heart Disease Father    • Hypertension Father    • Lipids Father      Hyperlipidemia   • Imaging:  MRI Brain 12/2016  =====  CONCLUSION:     1. No acute intracranial abnormality.   2. Mild scattered punctate and patchy T2 hyperintense signal within the periventricular and subcortical white matter likely represents mild chronic small vessel

## 2018-08-20 ENCOUNTER — HOSPITAL ENCOUNTER (OUTPATIENT)
Age: 58
Discharge: HOME | End: 2018-08-20
Payer: COMMERCIAL

## 2018-08-20 ENCOUNTER — APPOINTMENT (OUTPATIENT)
Dept: GENERAL RADIOLOGY | Age: 58
End: 2018-08-20
Attending: NURSE PRACTITIONER
Payer: COMMERCIAL

## 2018-08-20 ENCOUNTER — TELEPHONE (OUTPATIENT)
Dept: FAMILY MEDICINE CLINIC | Facility: CLINIC | Age: 58
End: 2018-08-20

## 2018-08-20 VITALS
SYSTOLIC BLOOD PRESSURE: 158 MMHG | TEMPERATURE: 98 F | OXYGEN SATURATION: 97 % | BODY MASS INDEX: 31.81 KG/M2 | RESPIRATION RATE: 16 BRPM | WEIGHT: 240 LBS | HEART RATE: 68 BPM | DIASTOLIC BLOOD PRESSURE: 88 MMHG | HEIGHT: 73 IN

## 2018-08-20 DIAGNOSIS — S61.432A PUNCTURE WOUND OF LEFT HAND WITHOUT FOREIGN BODY, INITIAL ENCOUNTER: Primary | ICD-10-CM

## 2018-08-20 PROCEDURE — 99213 OFFICE O/P EST LOW 20 MIN: CPT

## 2018-08-20 PROCEDURE — 73130 X-RAY EXAM OF HAND: CPT | Performed by: NURSE PRACTITIONER

## 2018-08-20 PROCEDURE — 99214 OFFICE O/P EST MOD 30 MIN: CPT

## 2018-08-20 RX ORDER — CEPHALEXIN 500 MG/1
500 CAPSULE ORAL 4 TIMES DAILY
Qty: 28 CAPSULE | Refills: 0 | Status: SHIPPED | OUTPATIENT
Start: 2018-08-20 | End: 2018-08-27

## 2018-08-20 NOTE — ED PROVIDER NOTES
Patient Seen in: [address] [facility]    History   Patient presents with:  Upper Extremity Injury (musculoskeletal)    Stated Complaint: L. Hand Swelling/Injury    46-year-old male presents today with some history of nail into the palm of the l Surgeon: Gerri Ceja MD;  Location: [address] [place] [address] MANAGEMENT  3/19/2012: Jose Carver L/S 2 LEV      Comment: Procedure: LUMBAR FACET INJECTION OR MEDIAL                BRANCH NERVE BLOCK;  Surgeon: Gerri Ceja, Single puncture wound to the palm of the left hand proximal to the second metacarpal proximal to the index finger with surrounding swelling and redness noted to the area. There was no exit wound. Bleeding is controlled.    Nursing note and vitals review

## 2018-08-20 NOTE — TELEPHONE ENCOUNTER
PT CALLED AND ADV THAT HE WAS FRAMING ON HIS [facility] YESTERDAY AND WAS USING NAIL GUN. SHOT NAIL THROUGH PALM SIDE UP, THROUGH SOME KNUCKLES AND SO FORTH. PULLED NAIL OUT, BLEED PRETTY GOOD, HAND SWOLLEN. PT WORRIED ABOUT INFECTION.     WOULD LIKE ANTI

## 2018-08-20 NOTE — ED INITIAL ASSESSMENT (HPI)
Pt states yesterday morning he was using a nail gun and accidentally got it into his left hand. Pt then pulled nail out and then washed hand up. Pt today with swelling and redness to hand.   Pt states has recent tetanus

## 2018-08-23 ENCOUNTER — TELEPHONE (OUTPATIENT)
Dept: FAMILY MEDICINE CLINIC | Facility: CLINIC | Age: 58
End: 2018-08-23

## 2018-08-23 DIAGNOSIS — G43.009 MIGRAINE WITHOUT AURA AND WITHOUT STATUS MIGRAINOSUS, NOT INTRACTABLE: ICD-10-CM

## 2018-08-23 DIAGNOSIS — Q64.4 URACHAL CYST: ICD-10-CM

## 2018-08-23 DIAGNOSIS — R19.8 UMBILICAL BLEEDING: ICD-10-CM

## 2018-08-23 RX ORDER — HYDROCODONE BITARTRATE AND ACETAMINOPHEN 7.5; 325 MG/1; MG/1
TABLET ORAL
Qty: 180 TABLET | Refills: 0 | Status: SHIPPED | OUTPATIENT
Start: 2018-08-23 | End: 2018-09-25

## 2018-08-23 NOTE — TELEPHONE ENCOUNTER
Last refilled on 7/24/18 for # 180 with 0 refills  Last seen on 7/2/18  Future Appointments  Date Time Provider Shaggy [place]   8/27/2018 2:30 PM [facility]   9/27/2018 3:00 PM Colten George MD [facility]   12/14

## 2018-08-23 NOTE — TELEPHONE ENCOUNTER
Pt needs a refill of the  hydrocodone-acetaminophen 7.5-325 MG Oral Tab  Please return call when ready [phone]

## 2018-08-27 ENCOUNTER — OFFICE VISIT (OUTPATIENT)
Dept: HEMATOLOGY/ONCOLOGY | Facility: HOSPITAL | Age: 58
End: 2018-08-27
Attending: INTERNAL MEDICINE
Payer: COMMERCIAL

## 2018-08-27 VITALS
BODY MASS INDEX: 32.34 KG/M2 | SYSTOLIC BLOOD PRESSURE: 166 MMHG | TEMPERATURE: 98 F | DIASTOLIC BLOOD PRESSURE: 95 MMHG | OXYGEN SATURATION: 97 % | HEIGHT: 72.99 IN | HEART RATE: 56 BPM | RESPIRATION RATE: 18 BRPM | WEIGHT: 244 LBS

## 2018-08-27 DIAGNOSIS — R16.1 SPLENOMEGALY: Primary | ICD-10-CM

## 2018-08-27 LAB
ALBUMIN SERPL-MCNC: 4 G/DL (ref 3.5–4.8)
ALBUMIN/GLOB SERPL: 1.3 {RATIO} (ref 1–2)
ALP LIVER SERPL-CCNC: 61 U/L (ref 45–117)
ALT SERPL-CCNC: 43 U/L (ref 17–63)
ANION GAP SERPL CALC-SCNC: 9 MMOL/L (ref 0–18)
AST SERPL-CCNC: 16 U/L (ref 15–41)
BASOPHILS # BLD AUTO: 0.03 X10(3) UL (ref 0–0.1)
BASOPHILS NFR BLD AUTO: 0.5 %
BILIRUB SERPL-MCNC: 0.4 MG/DL (ref 0.1–2)
BUN BLD-MCNC: 21 MG/DL (ref 8–20)
BUN/CREAT SERPL: 19.8 (ref 10–20)
CALCIUM BLD-MCNC: 9.4 MG/DL (ref 8.3–10.3)
CHLORIDE SERPL-SCNC: 108 MMOL/L (ref 101–111)
CO2 SERPL-SCNC: 23 MMOL/L (ref 22–32)
CREAT BLD-MCNC: 1.06 MG/DL (ref 0.7–1.3)
CRP SERPL-MCNC: <0.29 MG/DL (ref ?–1)
EOSINOPHIL # BLD AUTO: 0.09 X10(3) UL (ref 0–0.3)
EOSINOPHIL NFR BLD AUTO: 1.6 %
ERYTHROCYTE [DISTWIDTH] IN BLOOD BY AUTOMATED COUNT: 12.9 % (ref 11.5–16)
GLOBULIN PLAS-MCNC: 3.2 G/DL (ref 2.5–4)
GLUCOSE BLD-MCNC: 98 MG/DL (ref 70–99)
HCT VFR BLD AUTO: 44.7 % (ref 37–53)
HGB BLD-MCNC: 15.3 G/DL (ref 13–17)
IMMATURE GRANULOCYTE COUNT: 0.02 X10(3) UL (ref 0–1)
IMMATURE GRANULOCYTE RATIO %: 0.4 %
LDH: 235 U/L (ref 84–249)
LYMPHOCYTES # BLD AUTO: 1.65 X10(3) UL (ref 0.9–4)
LYMPHOCYTES NFR BLD AUTO: 29.4 %
M PROTEIN MFR SERPL ELPH: 7.2 G/DL (ref 6.1–8.3)
MCH RBC QN AUTO: 31.4 PG (ref 27–33.2)
MCHC RBC AUTO-ENTMCNC: 34.2 G/DL (ref 31–37)
MCV RBC AUTO: 91.8 FL (ref 80–99)
MONOCYTES # BLD AUTO: 0.54 X10(3) UL (ref 0.1–1)
MONOCYTES NFR BLD AUTO: 9.6 %
NEUTROPHIL ABS PRELIM: 3.28 X10 (3) UL (ref 1.3–6.7)
NEUTROPHILS # BLD AUTO: 3.28 X10(3) UL (ref 1.3–6.7)
NEUTROPHILS NFR BLD AUTO: 58.5 %
OSMOLALITY SERPL CALC.SUM OF ELEC: 293 MOSM/KG (ref 275–295)
PLATELET # BLD AUTO: 211 10(3)UL (ref 150–450)
POTASSIUM SERPL-SCNC: 3.6 MMOL/L (ref 3.6–5.1)
RBC # BLD AUTO: 4.87 X10(6)UL (ref 4.3–5.7)
RED CELL DISTRIBUTION WIDTH-SD: 43.4 FL (ref 35.1–46.3)
SED RATE-ML: 7 MM/HR (ref 0–12)
SODIUM SERPL-SCNC: 140 MMOL/L (ref 136–144)
WBC # BLD AUTO: 5.6 X10(3) UL (ref 4–13)

## 2018-08-27 PROCEDURE — 99244 OFF/OP CNSLTJ NEW/EST MOD 40: CPT | Performed by: INTERNAL MEDICINE

## 2018-08-27 RX ORDER — CARISOPRODOL 350 MG/1
TABLET ORAL
Qty: 120 TABLET | Refills: 0 | Status: SHIPPED | OUTPATIENT
Start: 2018-08-27 | End: 2018-09-03

## 2018-08-27 NOTE — TELEPHONE ENCOUNTER
LOV: 7/2/18   Last Refill: 7/30/18  #120 0 RF    Future Appointments  Date Time Provider Shaggy Harmon   8/27/2018 2:30 PM Dank Lam MD [facility] [address]   9/27/2018 3:00 PM Ruth Hale MD [facility]   12/14/2018 3:40 PM Denys Lopez,

## 2018-08-27 NOTE — CONSULTS
[facility] Report of Consultation    Patient Name: Jonas Lowery   YOB: 1960   Medical Record Number: [id number]   CSN: [id number]   Consulting Physician: Trudi Batista MD  Referring Physician(s): No ref.  provider found  Date of Consultat HERNIA SURGERY  3/19/2012: INJ PARAVERT F JNT L/S 1 LEV      Comment: Procedure: LUMBAR FACET INJECTION OR MEDIAL                BRANCH NERVE BLOCK;  Surgeon: Dinorah Booker MD;  Location: [address] [place] [address] MANAGEMENT  3/19/2012: INJ PARA [Oxycodone*    HIVES, OTHER (SEE COMMENTS)  Ibuprofen               OTHER (SEE COMMENTS)    Comment:Drug induced hepatitis    Current Medications:    Current Outpatient Prescriptions:   •  CARISOPRODOL 350 MG Oral Tab, TAKE 1 TABLET BY MOUTH FOUR TIMES SHANNAN (Tympanic)   Resp 18   Ht 1.854 m (6' 0.99\")   Wt 110.7 kg (244 lb)   SpO2 97%   BMI 32.20 kg/m²     Physical Examination:    General: Patient is alert and oriented x 3, not in acute distress. Psych:  His mood is calm and appropriate for this visit.   NIRMALA 6 months    Chio Willis MD  6:10 PM

## 2018-08-28 LAB — ANA SCREEN: NEGATIVE

## 2018-08-31 RX ORDER — ACETAMINOPHEN 500 MG
1000 TABLET ORAL ONCE
Status: CANCELLED | OUTPATIENT
Start: 2018-08-31 | End: 2018-08-31

## 2018-08-31 RX ORDER — SODIUM CHLORIDE, SODIUM LACTATE, POTASSIUM CHLORIDE, CALCIUM CHLORIDE 600; 310; 30; 20 MG/100ML; MG/100ML; MG/100ML; MG/100ML
INJECTION, SOLUTION INTRAVENOUS CONTINUOUS
Status: CANCELLED | OUTPATIENT
Start: 2018-08-31

## 2018-09-04 DIAGNOSIS — G43.519 INTRACTABLE PERSISTENT MIGRAINE AURA WITHOUT CEREBRAL INFARCTION AND WITHOUT STATUS MIGRAINOSUS: ICD-10-CM

## 2018-09-04 RX ORDER — VERAPAMIL HYDROCHLORIDE 80 MG/1
TABLET ORAL
Qty: 90 TABLET | Refills: 0 | OUTPATIENT
Start: 2018-09-04

## 2018-09-04 RX ORDER — CARISOPRODOL 350 MG/1
TABLET ORAL
Qty: 120 TABLET | Refills: 0 | Status: SHIPPED
Start: 2018-09-04 | End: 2018-10-04

## 2018-09-04 NOTE — TELEPHONE ENCOUNTER
LOV: 7/2/18  Last Refill: 8/13/18 To mail order     Spoke with pt and he advised that he does not need refill since it was sent to Mail order.

## 2018-09-11 ENCOUNTER — ANESTHESIA EVENT (OUTPATIENT)
Dept: SURGERY | Facility: HOSPITAL | Age: 58
End: 2018-09-11

## 2018-09-12 ENCOUNTER — HOSPITAL ENCOUNTER (OUTPATIENT)
Facility: HOSPITAL | Age: 58
Setting detail: HOSPITAL OUTPATIENT SURGERY
Discharge: HOME | End: 2018-09-12
Attending: SURGERY | Admitting: SURGERY
Payer: COMMERCIAL

## 2018-09-12 ENCOUNTER — ANESTHESIA (OUTPATIENT)
Dept: SURGERY | Facility: HOSPITAL | Age: 58
End: 2018-09-12

## 2018-09-12 VITALS
DIASTOLIC BLOOD PRESSURE: 89 MMHG | HEART RATE: 75 BPM | OXYGEN SATURATION: 98 % | BODY MASS INDEX: 31.12 KG/M2 | TEMPERATURE: 98 F | HEIGHT: 73 IN | RESPIRATION RATE: 16 BRPM | SYSTOLIC BLOOD PRESSURE: 127 MMHG | WEIGHT: 234.81 LBS

## 2018-09-12 DIAGNOSIS — K40.90 INGUINAL HERNIA OF RIGHT SIDE WITHOUT OBSTRUCTION OR GANGRENE: ICD-10-CM

## 2018-09-12 PROCEDURE — 0YU54JZ SUPPLEMENT RIGHT INGUINAL REGION WITH SYNTHETIC SUBSTITUTE, PERCUTANEOUS ENDOSCOPIC APPROACH: ICD-10-PCS | Performed by: SURGERY

## 2018-09-12 DEVICE — BARD MESH
Type: IMPLANTABLE DEVICE | Status: FUNCTIONAL
Brand: BARD MESH

## 2018-09-12 RX ORDER — LABETALOL HYDROCHLORIDE 5 MG/ML
5 INJECTION, SOLUTION INTRAVENOUS EVERY 5 MIN PRN
Status: DISCONTINUED | OUTPATIENT
Start: 2018-09-12 | End: 2018-09-12

## 2018-09-12 RX ORDER — HEPARIN SODIUM 5000 [USP'U]/ML
5000 INJECTION, SOLUTION INTRAVENOUS; SUBCUTANEOUS ONCE
Status: COMPLETED | OUTPATIENT
Start: 2018-09-12 | End: 2018-09-12

## 2018-09-12 RX ORDER — ONDANSETRON 2 MG/ML
4 INJECTION INTRAMUSCULAR; INTRAVENOUS AS NEEDED
Status: DISCONTINUED | OUTPATIENT
Start: 2018-09-12 | End: 2018-09-12

## 2018-09-12 RX ORDER — HYDROCODONE BITARTRATE AND ACETAMINOPHEN 10; 325 MG/1; MG/1
2 TABLET ORAL AS NEEDED
Status: COMPLETED | OUTPATIENT
Start: 2018-09-12 | End: 2018-09-12

## 2018-09-12 RX ORDER — ACETAMINOPHEN 500 MG
1000 TABLET ORAL ONCE
Status: DISCONTINUED | OUTPATIENT
Start: 2018-09-12 | End: 2018-09-12 | Stop reason: HOSPADM

## 2018-09-12 RX ORDER — HYDROCODONE BITARTRATE AND ACETAMINOPHEN 10; 325 MG/1; MG/1
1 TABLET ORAL AS NEEDED
Status: COMPLETED | OUTPATIENT
Start: 2018-09-12 | End: 2018-09-12

## 2018-09-12 RX ORDER — SODIUM CHLORIDE, SODIUM LACTATE, POTASSIUM CHLORIDE, CALCIUM CHLORIDE 600; 310; 30; 20 MG/100ML; MG/100ML; MG/100ML; MG/100ML
INJECTION, SOLUTION INTRAVENOUS CONTINUOUS
Status: DISCONTINUED | OUTPATIENT
Start: 2018-09-12 | End: 2018-09-12

## 2018-09-12 RX ORDER — CEFAZOLIN SODIUM/WATER 2 G/20 ML
2 SYRINGE (ML) INTRAVENOUS ONCE
Status: COMPLETED | OUTPATIENT
Start: 2018-09-12 | End: 2018-09-12

## 2018-09-12 RX ORDER — HYDROCODONE BITARTRATE AND ACETAMINOPHEN 5; 325 MG/1; MG/1
1-2 TABLET ORAL EVERY 4 HOURS PRN
Qty: 30 TABLET | Refills: 0 | Status: SHIPPED | OUTPATIENT
Start: 2018-09-12 | End: 2018-09-24

## 2018-09-12 RX ORDER — MIDAZOLAM HYDROCHLORIDE 1 MG/ML
1 INJECTION INTRAMUSCULAR; INTRAVENOUS EVERY 5 MIN PRN
Status: DISCONTINUED | OUTPATIENT
Start: 2018-09-12 | End: 2018-09-12

## 2018-09-12 RX ORDER — MEPERIDINE HYDROCHLORIDE 25 MG/ML
12.5 INJECTION INTRAMUSCULAR; INTRAVENOUS; SUBCUTANEOUS AS NEEDED
Status: COMPLETED | OUTPATIENT
Start: 2018-09-12 | End: 2018-09-12

## 2018-09-12 RX ORDER — NALOXONE HYDROCHLORIDE 0.4 MG/ML
80 INJECTION, SOLUTION INTRAMUSCULAR; INTRAVENOUS; SUBCUTANEOUS AS NEEDED
Status: DISCONTINUED | OUTPATIENT
Start: 2018-09-12 | End: 2018-09-12

## 2018-09-12 RX ORDER — DIPHENHYDRAMINE HYDROCHLORIDE 50 MG/ML
12.5 INJECTION INTRAMUSCULAR; INTRAVENOUS AS NEEDED
Status: DISCONTINUED | OUTPATIENT
Start: 2018-09-12 | End: 2018-09-12

## 2018-09-12 RX ORDER — MEPERIDINE HYDROCHLORIDE 25 MG/ML
INJECTION INTRAMUSCULAR; INTRAVENOUS; SUBCUTANEOUS
Status: COMPLETED
Start: 2018-09-12 | End: 2018-09-12

## 2018-09-12 RX ORDER — BUPIVACAINE HYDROCHLORIDE 5 MG/ML
INJECTION, SOLUTION EPIDURAL; INTRACAUDAL AS NEEDED
Status: DISCONTINUED | OUTPATIENT
Start: 2018-09-12 | End: 2018-09-12 | Stop reason: HOSPADM

## 2018-09-12 RX ORDER — ACETAMINOPHEN 500 MG
1000 TABLET ORAL ONCE
COMMUNITY
End: 2019-02-14

## 2018-09-12 RX ORDER — MORPHINE SULFATE 4 MG/ML
2 INJECTION, SOLUTION INTRAMUSCULAR; INTRAVENOUS EVERY 5 MIN PRN
Status: DISCONTINUED | OUTPATIENT
Start: 2018-09-12 | End: 2018-09-12

## 2018-09-12 NOTE — ANESTHESIA POSTPROCEDURE EVALUATION
[facility] Patient Status:  Hospital Outpatient Surgery   Age/Gender 66year old male MRN [id number]   [facility] SURGERY Attending Maryam Soto, [address] Day # 0 PCP Swati Mendez DO       Anesthesia Post-op Note

## 2018-09-12 NOTE — BRIEF OP NOTE
Pre-Operative Diagnosis: Inguinal hernia of right side without obstruction or gangrene [K40.90]     Post-Operative Diagnosis: Inguinal hernia of right side without obstruction or gangrene [K40.90]      Procedure Performed:   Procedure(s):  RIGHT LAPAROSCOP

## 2018-09-12 NOTE — H&P
Patient presents with:  Hernia: indirect hernia consult referred by anne- review CT , pt c/o of severe abd pain, radiates to left low back x several months, pt c/o of bleeding from navel area x 3days, denies bleeding today        REFERRED BY     Patient p NERVE BLOCK;  Surgeon: Josette Lewis MD;  Location: [address] [place] [address] MANAGEMENT  3/19/2012: Ignacio Nguyen L/S 2 LEV      Comment: Procedure: LUMBAR FACET INJECTION OR MEDIAL                BRANCH NERVE BLOCK;  Surgeon: Sherry Espinoza Relation Age of Onset   • Heart Disease Mother     • Hypertension Mother     • Lipids Mother         Hyperlipidemia   • Heart Disease Father     • Hypertension Father     • Lipids Father         Hyperlipidemia   • Diabetes Father        Smoking status: For right lower quadrant positive right inguinal hernia  LYMPHATIC: no axillary , supraclavicular or inguinal lymphadenopathy  EXTREMITIES: no cyanosis, clubbing or edema, no atrophy, full ROM     STUDIES:              Hospital Outpatient Visit on 07/02/2018

## 2018-09-12 NOTE — OPERATIVE REPORT
[address]    PATIENT'S NAME: Darby Gabriel   ATTENDING PHYSICIAN: Horace Ortega D.O.   OPERATING PHYSICIAN: Horace Ortega D.O.   PATIENT ACCOUNT#:   [de-identified]    LOCATION:  PACU [facility] PACU 3 EDWP 10  MEDICAL RECORD #:   [id number]       DATE OF BIRTH demonstrating no evidence of inflammatory process or other abnormality of the ileum. The appendix was visualized. Picture taken. It was normal.  The ascending colon was visualized. Picture taken. It was normal.    Liver inspected.   It was found to b procedure. Sterile dressings were applied. The patient was transported from the operative suite to Recovery in stable condition.     Dictated By Stephy Johnson D.O.  d: 09/12/2018 09:23:26  t: 09/12/2018 10:41:29  [facility] [id number]  RM/    cc: Jonathan Romeo

## 2018-09-12 NOTE — ANESTHESIA PREPROCEDURE EVALUATION
PRE-OP EVALUATION    Patient Name: Tip Joe    Pre-op Diagnosis: Inguinal hernia of right side without obstruction or gangrene [K40.90]    Procedure(s):  RIGHT LAPAROSCOPIC INGUINAL HERNIA REPAIR WITH MESH    Surgeon(s) and Role:     Tamie Holder DULoxetine HCl 60 MG Oral Cap DR Particles Take 1 capsule (60 mg total) by mouth 2 (two) times daily. Disp: 90 capsule Rfl: 3   MAGNESIUM OR Take 4 tablets by mouth every evening.  Disp:  Rfl:        Allergies: Novocain; Proparacaine Hcl; Percodan [Oxycod 08/27/2018    MCHC 34.2 08/27/2018    RDW 12.9 08/27/2018    .0 08/27/2018     Lab Results   Component Value Date     08/27/2018    K 3.6 08/27/2018     08/27/2018    CO2 23.0 08/27/2018    BUN 21 (H) 08/27/2018    CREATSERUM 1.06 08/27/

## 2018-09-24 ENCOUNTER — TELEPHONE (OUTPATIENT)
Dept: FAMILY MEDICINE CLINIC | Facility: CLINIC | Age: 58
End: 2018-09-24

## 2018-09-24 RX ORDER — HYDROCODONE BITARTRATE AND ACETAMINOPHEN 5; 325 MG/1; MG/1
1-2 TABLET ORAL EVERY 4 HOURS PRN
Qty: 30 TABLET | Refills: 0 | Status: SHIPPED | OUTPATIENT
Start: 2018-09-24 | End: 2018-09-25 | Stop reason: ALTCHOICE

## 2018-09-24 NOTE — TELEPHONE ENCOUNTER
HYDROcodone-acetaminophen 5-325 MG Oral Tab 30 tablet 0 9/12/2018    Sig :  Take 1-2 tablets by mouth every 4 (four) hours as needed for Pain. Route:   Oral     PRN Reason(s):   Pain       Patient will be picking up the script for [facility].

## 2018-09-24 NOTE — TELEPHONE ENCOUNTER
Last refilled on 9/12/18 for # 30 with 0 refills  Last seen on 7/2/18  Future Appointments   Date Time Provider Shaggy Harmon   9/26/2018  3:00 PM Gaby Murrieta DO [facility]   9/27/2018  3:00 PM Yin Rodriguez MD [facility]   12/1

## 2018-09-25 ENCOUNTER — TELEPHONE (OUTPATIENT)
Dept: FAMILY MEDICINE CLINIC | Facility: CLINIC | Age: 58
End: 2018-09-25

## 2018-09-25 DIAGNOSIS — R19.8 UMBILICAL BLEEDING: ICD-10-CM

## 2018-09-25 DIAGNOSIS — Q64.4 URACHAL CYST: ICD-10-CM

## 2018-09-25 DIAGNOSIS — G43.009 MIGRAINE WITHOUT AURA AND WITHOUT STATUS MIGRAINOSUS, NOT INTRACTABLE: ICD-10-CM

## 2018-09-25 RX ORDER — HYDROCODONE BITARTRATE AND ACETAMINOPHEN 7.5; 325 MG/1; MG/1
TABLET ORAL
Qty: 180 TABLET | Refills: 0 | Status: SHIPPED | OUTPATIENT
Start: 2018-09-25 | End: 2018-10-24

## 2018-09-25 NOTE — TELEPHONE ENCOUNTER
Patient picked up a script for norco yesterday. Had it filled and didn't notice until he took it today but we filled the wrong script. We filled a script that Dr. Jerrod Raza had given him after surgery. It was a smaller quantity and a lesser dose.  Patient would

## 2018-09-25 NOTE — TELEPHONE ENCOUNTER
We filled [facility] as a #30 0 RF    Pt also has Norco 7.5-325  #180 0 on file. Refill we did yesterday for 5-325 and pt wanted the 7.5-325- can we fill the other script?     Pt filled the wrong script at the pharmacy already

## 2018-09-25 NOTE — TELEPHONE ENCOUNTER
Spoke with pharmacy [facility]) and advised that we are refilling the pt praveen and the Dr. Glen Sandra is aware of the new script being sent over.     Xu Dobbins verbalized understanding    Called pt and advised that new script is ready to be picked up- v

## 2018-09-26 ENCOUNTER — OFFICE VISIT (OUTPATIENT)
Dept: SURGERY | Facility: CLINIC | Age: 58
End: 2018-09-26

## 2018-09-26 VITALS
SYSTOLIC BLOOD PRESSURE: 142 MMHG | HEART RATE: 74 BPM | TEMPERATURE: 99 F | HEIGHT: 74 IN | BODY MASS INDEX: 31.18 KG/M2 | DIASTOLIC BLOOD PRESSURE: 89 MMHG | WEIGHT: 243 LBS

## 2018-09-26 DIAGNOSIS — K40.90 INGUINAL HERNIA WITHOUT OBSTRUCTION OR GANGRENE, RECURRENCE NOT SPECIFIED, UNSPECIFIED LATERALITY: Primary | ICD-10-CM

## 2018-09-26 PROCEDURE — 99024 POSTOP FOLLOW-UP VISIT: CPT | Performed by: SURGERY

## 2018-09-26 NOTE — PROGRESS NOTES
Follow Up Visit Note       Active Problems      No diagnosis found. Chief Complaint   Patient presents with:  Post-Op: P/O 9/12 hernia repair. Pt denies fever, chills, N/V.  at the surgical site.         History of Present Illness    Patien LEVEL  No date: HERNIA SURGERY  3/19/2012: INJ PARAVERT F JNT L/S 1 LEV      Comment:  Procedure: LUMBAR FACET INJECTION OR MEDIAL BRANCH NERVE               BLOCK;  Surgeon: Elyssa Cannon MD;  Location: [address] [place] [address]  3/1 Not on file      Transportation needs - non-medical: Not on file    Occupational History      Not on file    Tobacco Use      Smoking status: Former Smoker        Packs/day: 1.00        Years: 5.00        Pack years: 5        Types: Cigarettes        Quit DAILY Disp: 180 tablet Rfl: 1   DULoxetine HCl 60 MG Oral Cap DR Particles Take 1 capsule (60 mg total) by mouth 2 (two) times daily. Disp: 90 capsule Rfl: 3   MAGNESIUM OR Take 4 tablets by mouth every evening.  Disp:  Rfl:         Review of Systems  The R

## 2018-09-26 NOTE — TELEPHONE ENCOUNTER
Pt picked up script for [facility]. PT [place]# [phone].    Pt already had other script filled at pharmacy-lower dosage of norco.

## 2018-10-04 RX ORDER — CARISOPRODOL 350 MG/1
TABLET ORAL
Qty: 120 TABLET | Refills: 0 | Status: SHIPPED
Start: 2018-10-04 | End: 2018-11-01

## 2018-10-04 NOTE — TELEPHONE ENCOUNTER
LOV: 7/2/18  Last Refill: 9/4/48 #120 0 RF    Future Appointments   Date Time Provider Shaggy Harmon   12/14/2018  3:40 PM Latha DO KARAN Mcelroy

## 2018-10-24 DIAGNOSIS — R19.8 UMBILICAL BLEEDING: ICD-10-CM

## 2018-10-24 DIAGNOSIS — Q64.4 URACHAL CYST: ICD-10-CM

## 2018-10-24 DIAGNOSIS — G43.009 MIGRAINE WITHOUT AURA AND WITHOUT STATUS MIGRAINOSUS, NOT INTRACTABLE: ICD-10-CM

## 2018-10-24 RX ORDER — HYDROCODONE BITARTRATE AND ACETAMINOPHEN 7.5; 325 MG/1; MG/1
TABLET ORAL
Qty: 180 TABLET | Refills: 0 | Status: SHIPPED | OUTPATIENT
Start: 2018-10-24 | End: 2018-11-21

## 2018-10-24 NOTE — TELEPHONE ENCOUNTER
LOV: 7/2/18  Last Refill: 9/25/18 #180 0 RF    Future Appointments   Date Time Provider Shaggy Harmon   12/14/2018  3:40 PM DO KARAN Mcdonald

## 2018-10-24 NOTE — TELEPHONE ENCOUNTER
PT CALLED AND ADV GOING OUT OF TOWN AND NEEDS REFILL BEFORE LEAVING     hydrocodone-acetaminophen 7.5-325 MG Oral Tab    PLEASE CALL WHEN READY FOR P/U    THANK YOU

## 2018-11-02 RX ORDER — CARISOPRODOL 350 MG/1
TABLET ORAL
Qty: 120 TABLET | Refills: 0 | Status: SHIPPED
Start: 2018-11-02 | End: 2018-11-30

## 2018-11-02 NOTE — TELEPHONE ENCOUNTER
Last refilled on 10/4/18 for # 120 with 0 refills  Last seen on 5/14/18  Future Appointments   Date Time Provider Shaggy Harmon   12/14/2018  3:40 PM DO KARAN Marsh        Thank you.

## 2018-11-08 RX ORDER — TOPIRAMATE 50 MG/1
TABLET, FILM COATED ORAL
Qty: 240 TABLET | Refills: 0 | Status: SHIPPED | OUTPATIENT
Start: 2018-11-08 | End: 2018-11-09

## 2018-11-08 NOTE — TELEPHONE ENCOUNTER
LOV: 7/2/18   Last Refill: 5/8/18  #720 1 RF    Future Appointments   Date Time Provider Shaggy Harmon   12/14/2018  3:40 PM DO KARAN Vargas

## 2018-11-09 ENCOUNTER — OFFICE VISIT (OUTPATIENT)
Dept: FAMILY MEDICINE CLINIC | Facility: CLINIC | Age: 58
End: 2018-11-09
Payer: COMMERCIAL

## 2018-11-09 VITALS
TEMPERATURE: 98 F | WEIGHT: 237 LBS | HEART RATE: 100 BPM | DIASTOLIC BLOOD PRESSURE: 90 MMHG | SYSTOLIC BLOOD PRESSURE: 152 MMHG | BODY MASS INDEX: 30 KG/M2 | OXYGEN SATURATION: 99 %

## 2018-11-09 DIAGNOSIS — M25.512 ACUTE PAIN OF LEFT SHOULDER: Primary | ICD-10-CM

## 2018-11-09 DIAGNOSIS — M75.42 IMPINGEMENT SYNDROME OF LEFT SHOULDER: ICD-10-CM

## 2018-11-09 DIAGNOSIS — G43.101 MIGRAINE WITH AURA AND WITH STATUS MIGRAINOSUS, NOT INTRACTABLE: Primary | ICD-10-CM

## 2018-11-09 PROCEDURE — 99213 OFFICE O/P EST LOW 20 MIN: CPT | Performed by: FAMILY MEDICINE

## 2018-11-09 RX ORDER — PREDNISONE 10 MG/1
TABLET ORAL
Qty: 28 TABLET | Refills: 0 | Status: SHIPPED | OUTPATIENT
Start: 2018-11-09 | End: 2019-02-14 | Stop reason: ALTCHOICE

## 2018-11-09 NOTE — PROGRESS NOTES
HPI:    Patient ID: Harjit Starkey is a 66year old male. Patient presents with:  Shoulder Pain: per pt, left       HPI   Patient is here for acute left shoulder pain. States on 10/29/18 he was at work, helping his coworker.  He tried to pull a bigger ob acetaminophen 500 MG Oral Tab Take 1,000 mg by mouth one time.  Disp:  Rfl:    ROSUVASTATIN CALCIUM 10 MG Oral Tab TAKE 1 TABLET BY MOUTH  NIGHTLY Disp: 90 tablet Rfl: 1       /90   Pulse 100   Temp 97.9 °F (36.6 °C) (Temporal)   Wt 237 lb   SpO2 99 Surgeon: Natalia Figueroa MD;  Location: [facility]   • INJ PARAVERT F JNT L/S 2 LEV  3/19/2012    Procedure: LUMBAR FACET INJECTION OR MEDIAL BRANCH NERVE BLOCK;  Surgeon: Natalia Figueroa MD;  Location: [address] [place] [address] arm for Neers test due to pain. Normal strength in bilateral upper extremities. Decreased sensation on lmedial side left hand               ASSESSMENT/PLAN:     Rowan Handy was seen today for shoulder pain.     Diagnoses and all orders for this visit:    Acute pa

## 2018-11-09 NOTE — TELEPHONE ENCOUNTER
Received a fax from [facility] requesting a refill, patient received a RX from pcp Dr. Amanda Castillo yesterday 11.8.18 to [facility]. LMTCB-please verify with patient 1. If he would like rx canceled at [place] and sent to [facility], and 2.  Medication has

## 2018-11-10 RX ORDER — DULOXETIN HYDROCHLORIDE 60 MG/1
CAPSULE, DELAYED RELEASE ORAL
Qty: 180 CAPSULE | Refills: 2 | Status: SHIPPED | OUTPATIENT
Start: 2018-11-10 | End: 2019-07-24

## 2018-11-10 NOTE — TELEPHONE ENCOUNTER
LOV: 11/0/18  Last Refill:1/31/18 #90 3 RF    Future Appointments   Date Time Provider Shaggy Harmon   12/14/2018  3:40 PM DO KARAN Jessica

## 2018-11-14 RX ORDER — TOPIRAMATE 50 MG/1
TABLET, FILM COATED ORAL
Qty: 240 TABLET | Refills: 0 | Status: SHIPPED | OUTPATIENT
Start: 2018-11-14 | End: 2018-12-17

## 2018-11-14 NOTE — TELEPHONE ENCOUNTER
Spoke with patient, states he did not  Rx from [facility] [facility], would like Rx for Topamax sent to [facility] mail order and would like medication prescribed by Dr. Rose Solis. Rx pended and routed to provider. Per LOV:     Assessment:   This is a 66 y/

## 2018-11-15 ENCOUNTER — TELEPHONE (OUTPATIENT)
Dept: FAMILY MEDICINE CLINIC | Facility: CLINIC | Age: 58
End: 2018-11-15

## 2018-11-15 NOTE — TELEPHONE ENCOUNTER
Received fax for authorization for MRI from [employer]. Start date 11/14/18 until 1/13/19. Claim # [id number]. Review ID [id number]. Determination: approval. Dr Allyn Pryor aware.  See referral note from 11/9/18    Referral Notes   Number of Notes: 3   Type Date User Summar

## 2018-11-21 DIAGNOSIS — Q64.4 URACHAL CYST: ICD-10-CM

## 2018-11-21 DIAGNOSIS — G43.009 MIGRAINE WITHOUT AURA AND WITHOUT STATUS MIGRAINOSUS, NOT INTRACTABLE: ICD-10-CM

## 2018-11-21 DIAGNOSIS — R19.8 UMBILICAL BLEEDING: ICD-10-CM

## 2018-11-21 RX ORDER — HYDROCODONE BITARTRATE AND ACETAMINOPHEN 7.5; 325 MG/1; MG/1
TABLET ORAL
Qty: 180 TABLET | Refills: 0 | Status: SHIPPED | OUTPATIENT
Start: 2018-11-21 | End: 2018-12-20

## 2018-11-21 NOTE — TELEPHONE ENCOUNTER
LOV: 7/2/18  Last Refill: 10/24/18  #180 0 RF    Future Appointments   Date Time Provider Shaggy Harmon   12/14/2018  3:40 PM DO KARAN Jesus

## 2018-11-30 DIAGNOSIS — G43.101 MIGRAINE WITH AURA AND WITH STATUS MIGRAINOSUS, NOT INTRACTABLE: ICD-10-CM

## 2018-11-30 RX ORDER — CARISOPRODOL 350 MG/1
TABLET ORAL
Qty: 120 TABLET | Refills: 0 | Status: SHIPPED | OUTPATIENT
Start: 2018-11-30 | End: 2018-12-20

## 2018-12-03 ENCOUNTER — HOSPITAL ENCOUNTER (OUTPATIENT)
Dept: MRI IMAGING | Facility: HOSPITAL | Age: 58
Discharge: HOME | End: 2018-12-03
Attending: FAMILY MEDICINE
Payer: OTHER MISCELLANEOUS

## 2018-12-03 DIAGNOSIS — M75.42 IMPINGEMENT SYNDROME OF LEFT SHOULDER: ICD-10-CM

## 2018-12-03 DIAGNOSIS — M25.512 ACUTE PAIN OF LEFT SHOULDER: ICD-10-CM

## 2018-12-03 PROCEDURE — 73221 MRI JOINT UPR EXTREM W/O DYE: CPT | Performed by: FAMILY MEDICINE

## 2018-12-03 NOTE — TELEPHONE ENCOUNTER
Medication: SUMATRIPTAN SUCCINATE 100 MG Oral Tab    Date of last refill: 08/13/18 (#9/2)  [place]    Last office visit: 8/13/2018  Due back to clinic per last office note:  Around 12/13/18  Date next office visit sc

## 2018-12-04 RX ORDER — SUMATRIPTAN 100 MG/1
TABLET, FILM COATED ORAL
Qty: 9 TABLET | Refills: 0 | Status: SHIPPED | OUTPATIENT
Start: 2018-12-04 | End: 2019-01-21

## 2018-12-05 ENCOUNTER — TELEPHONE (OUTPATIENT)
Dept: FAMILY MEDICINE CLINIC | Facility: CLINIC | Age: 58
End: 2018-12-05

## 2018-12-05 NOTE — TELEPHONE ENCOUNTER
Called Patient to discuss labs. No answer, left message to call back office. Thanks.     Wing Blayne MD

## 2018-12-05 NOTE — TELEPHONE ENCOUNTER
Spoke with Patient to discuss about MRI shoulder. Explained that it shows Chronic SLAP injury. Small paralabral cyst present. No acute process. Rotator cuff intact. Advised Patient to see orthopedics.  States he wants to discuss this with DR Bria Jansen (PCP) Kamille Hsu

## 2018-12-06 DIAGNOSIS — M25.512 ACUTE PAIN OF LEFT SHOULDER: Primary | ICD-10-CM

## 2018-12-17 DIAGNOSIS — G43.101 MIGRAINE WITH AURA AND WITH STATUS MIGRAINOSUS, NOT INTRACTABLE: ICD-10-CM

## 2018-12-17 RX ORDER — TOPIRAMATE 50 MG/1
TABLET, FILM COATED ORAL
Qty: 240 TABLET | Refills: 0 | Status: SHIPPED | OUTPATIENT
Start: 2018-12-17 | End: 2019-01-09

## 2018-12-17 NOTE — TELEPHONE ENCOUNTER
Medication: TOPIRAMATE 50 MG Oral Tab    Date of last refill: 11/14/18 (#240/0)  [place]    Last office visit: 08/13/2018  Due back to clinic per last office note:  Around 12/13/18  Date next office visit scheduled:

## 2018-12-20 DIAGNOSIS — G43.009 MIGRAINE WITHOUT AURA AND WITHOUT STATUS MIGRAINOSUS, NOT INTRACTABLE: ICD-10-CM

## 2018-12-20 DIAGNOSIS — R19.8 UMBILICAL BLEEDING: ICD-10-CM

## 2018-12-20 DIAGNOSIS — Q64.4 URACHAL CYST: ICD-10-CM

## 2018-12-20 RX ORDER — CARISOPRODOL 350 MG/1
TABLET ORAL
Qty: 120 TABLET | Refills: 0 | Status: SHIPPED | OUTPATIENT
Start: 2018-12-20 | End: 2019-01-21

## 2018-12-20 RX ORDER — HYDROCODONE BITARTRATE AND ACETAMINOPHEN 7.5; 325 MG/1; MG/1
TABLET ORAL
Qty: 180 TABLET | Refills: 0 | Status: SHIPPED | OUTPATIENT
Start: 2018-12-20 | End: 2019-01-21

## 2018-12-20 NOTE — TELEPHONE ENCOUNTER
LOV: 11/9/18 - Dr. Ben Oneill  Last Refill:   Freeze  11/21/18  #180 0 RF  Soma  1/30/18  #120 0 RF    Future Appointments   Date Time Provider Shaggy Harmon   12/27/2018  3:30 PM CHARISSE Navarro PT AT [facility]   1/2/2019  4:00 PM Naye Ramirez,

## 2018-12-20 NOTE — TELEPHONE ENCOUNTER
Pt needs script for the Norco and also needs refill of the soma, runs out on 12-30-18, goes to [place] in [place]. Pls call pt when script ready for p-u - hopes to p-u by weekend.

## 2018-12-27 PROBLEM — M25.512 ACUTE PAIN OF LEFT SHOULDER: Status: ACTIVE | Noted: 2018-12-27

## 2019-01-06 DIAGNOSIS — G43.101 MIGRAINE WITH AURA AND WITH STATUS MIGRAINOSUS, NOT INTRACTABLE: ICD-10-CM

## 2019-01-07 RX ORDER — VERAPAMIL HYDROCHLORIDE 80 MG/1
TABLET ORAL
Qty: 90 TABLET | Refills: 0 | Status: SHIPPED | OUTPATIENT
Start: 2019-01-07 | End: 2019-02-20

## 2019-01-07 NOTE — TELEPHONE ENCOUNTER
Medication: VERAPAMIL HCL 80 MG Oral Tab    Date of last refill: 08/13/18 (#270/1)  [place]    Last office visit: 08/13/18  Due back to clinic per last office note:  Around 12/13/18  Date next office visit scheduled:

## 2019-01-09 DIAGNOSIS — G43.101 MIGRAINE WITH AURA AND WITH STATUS MIGRAINOSUS, NOT INTRACTABLE: ICD-10-CM

## 2019-01-10 NOTE — TELEPHONE ENCOUNTER
Sent the patient a [employer] message to schedule an appointment. The patient had an appt on 12/14/18, which he was a no show.        Medication: TOPIRAMATE 50 MG Oral Tab    Date of last refill: 12/17/18 (#240/0)  [place] N

## 2019-01-15 RX ORDER — TOPIRAMATE 50 MG/1
TABLET, FILM COATED ORAL
Qty: 240 TABLET | Refills: 0 | Status: SHIPPED | OUTPATIENT
Start: 2019-01-15 | End: 2019-03-19

## 2019-01-21 DIAGNOSIS — Q64.4 URACHAL CYST: ICD-10-CM

## 2019-01-21 DIAGNOSIS — G43.101 MIGRAINE WITH AURA AND WITH STATUS MIGRAINOSUS, NOT INTRACTABLE: ICD-10-CM

## 2019-01-21 DIAGNOSIS — R19.8 UMBILICAL BLEEDING: ICD-10-CM

## 2019-01-21 DIAGNOSIS — G43.009 MIGRAINE WITHOUT AURA AND WITHOUT STATUS MIGRAINOSUS, NOT INTRACTABLE: ICD-10-CM

## 2019-01-21 RX ORDER — HYDROCODONE BITARTRATE AND ACETAMINOPHEN 7.5; 325 MG/1; MG/1
TABLET ORAL
Qty: 180 TABLET | Refills: 0 | Status: SHIPPED | OUTPATIENT
Start: 2019-01-21 | End: 2019-02-27

## 2019-01-21 RX ORDER — CARISOPRODOL 350 MG/1
TABLET ORAL
Qty: 120 TABLET | Refills: 0 | Status: SHIPPED | OUTPATIENT
Start: 2019-01-21 | End: 2019-03-05

## 2019-01-21 NOTE — TELEPHONE ENCOUNTER
Pt needs script for Norco and will also need the Carisoprodol 350 MG Oral Tab refilled. Pt would like to get these filled today while he is off, as he if leaving bright and early on Thurs.

## 2019-01-21 NOTE — TELEPHONE ENCOUNTER
LOV: 7/2/18  Last Refill:12/20/18 #180 0 RF    Future Appointments   Date Time Provider Shaggy Harmon   1/21/2019  4:00 PM CHARISSE Loomis PT AT [facility]   1/23/2019  4:00 PM CHARISSE Loomis PT AT [facility]   1/31/2019  4:40 PM Alethea Marc

## 2019-01-22 RX ORDER — SUMATRIPTAN 100 MG/1
TABLET, FILM COATED ORAL
Qty: 9 TABLET | Refills: 0 | Status: SHIPPED | OUTPATIENT
Start: 2019-01-22 | End: 2019-03-14

## 2019-01-22 NOTE — TELEPHONE ENCOUNTER
Medication: SUMATRIPTAN SUCCINATE 100 MG Oral Tab    Date of last refill: 12/04/18 (#9/0)  [place]    Last office visit: 08/13/18  Due back to clinic per last office note:  Around 12/13/18  Date next office visit marybeth

## 2019-01-30 PROBLEM — M19.012 ARTHRITIS OF LEFT ACROMIOCLAVICULAR JOINT: Status: ACTIVE | Noted: 2019-01-30

## 2019-01-30 PROBLEM — S43.432A SUPERIOR LABRUM ANTERIOR-TO-POSTERIOR (SLAP) TEAR OF LEFT SHOULDER: Status: ACTIVE | Noted: 2019-01-30

## 2019-02-14 ENCOUNTER — OFFICE VISIT (OUTPATIENT)
Dept: FAMILY MEDICINE CLINIC | Facility: CLINIC | Age: 59
End: 2019-02-14
Payer: OTHER MISCELLANEOUS

## 2019-02-14 VITALS
DIASTOLIC BLOOD PRESSURE: 90 MMHG | HEIGHT: 72 IN | SYSTOLIC BLOOD PRESSURE: 132 MMHG | RESPIRATION RATE: 20 BRPM | WEIGHT: 228.38 LBS | BODY MASS INDEX: 30.93 KG/M2 | HEART RATE: 76 BPM | TEMPERATURE: 98 F

## 2019-02-14 DIAGNOSIS — M19.012 ARTHRITIS OF LEFT ACROMIOCLAVICULAR JOINT: ICD-10-CM

## 2019-02-14 DIAGNOSIS — S43.432A SUPERIOR LABRUM ANTERIOR-TO-POSTERIOR (SLAP) TEAR OF LEFT SHOULDER: ICD-10-CM

## 2019-02-14 DIAGNOSIS — Z01.818 PREOP EXAMINATION: Primary | ICD-10-CM

## 2019-02-14 LAB
ALBUMIN SERPL-MCNC: 4.1 G/DL (ref 3.4–5)
ALBUMIN/GLOB SERPL: 1.2 {RATIO} (ref 1–2)
ALP LIVER SERPL-CCNC: 68 U/L (ref 45–117)
ALT SERPL-CCNC: 50 U/L (ref 16–61)
ANION GAP SERPL CALC-SCNC: 7 MMOL/L (ref 0–18)
AST SERPL-CCNC: 29 U/L (ref 15–37)
BILIRUB SERPL-MCNC: 0.5 MG/DL (ref 0.1–2)
BUN BLD-MCNC: 20 MG/DL (ref 7–18)
BUN/CREAT SERPL: 19.6 (ref 10–20)
CALCIUM BLD-MCNC: 8.9 MG/DL (ref 8.5–10.1)
CHLORIDE SERPL-SCNC: 113 MMOL/L (ref 98–107)
CO2 SERPL-SCNC: 23 MMOL/L (ref 21–32)
CREAT BLD-MCNC: 1.02 MG/DL (ref 0.7–1.3)
GLOBULIN PLAS-MCNC: 3.4 G/DL (ref 2.8–4.4)
GLUCOSE BLD-MCNC: 119 MG/DL (ref 70–99)
M PROTEIN MFR SERPL ELPH: 7.5 G/DL (ref 6.4–8.2)
OSMOLALITY SERPL CALC.SUM OF ELEC: 300 MOSM/KG (ref 275–295)
POTASSIUM SERPL-SCNC: 3.9 MMOL/L (ref 3.5–5.1)
SODIUM SERPL-SCNC: 143 MMOL/L (ref 136–145)

## 2019-02-14 PROCEDURE — 93000 ELECTROCARDIOGRAM COMPLETE: CPT | Performed by: FAMILY MEDICINE

## 2019-02-14 PROCEDURE — 36415 COLL VENOUS BLD VENIPUNCTURE: CPT | Performed by: FAMILY MEDICINE

## 2019-02-14 PROCEDURE — 99244 OFF/OP CNSLTJ NEW/EST MOD 40: CPT | Performed by: FAMILY MEDICINE

## 2019-02-14 PROCEDURE — 80053 COMPREHEN METABOLIC PANEL: CPT | Performed by: FAMILY MEDICINE

## 2019-02-14 NOTE — PROGRESS NOTES
Rosmery Guillen is a 66year old male who presents for a pre-operative physical exam. Patient is to have A LEFT SHOULDER ARTHROSCOPIC SUBACROMIAL DECOMPRESSION, DISTAL CLAVICLE EXCISION, LABRAL DEBRIDEMENT, OPEN BICEPS TENDONOSIS, to be done by Dr. 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 : . Children: 2.    Exercise: minimal.  D anterior-to-posterior (slap) tear of left shoulder  Arthritis of left acromioclavicular joint    Orders Placed This Encounter      Comp Metabolic Panel (14) [E]      *Venipuncture    CMP SHOWS ELEVATED GLUCOSE, BUT THIS WAS NOT A FASTING LAB, OTHERWISE THE

## 2019-02-15 ENCOUNTER — TELEPHONE (OUTPATIENT)
Dept: FAMILY MEDICINE CLINIC | Facility: CLINIC | Age: 59
End: 2019-02-15

## 2019-02-15 NOTE — TELEPHONE ENCOUNTER
----- Message from Sae Hay DO sent at 2/15/2019  8:10 AM CST -----  Can notify Idris Mckeon his labs are good to go fir surgery, this was not a fasting lab, no contraindications to surgery

## 2019-02-18 ENCOUNTER — TELEPHONE (OUTPATIENT)
Dept: FAMILY MEDICINE CLINIC | Facility: CLINIC | Age: 59
End: 2019-02-18

## 2019-02-18 NOTE — TELEPHONE ENCOUNTER
Please fax patient's pre op lab work and EKG to [facility] Fax: [phone] Atten: Cornel Zazueta

## 2019-02-19 ENCOUNTER — TELEPHONE (OUTPATIENT)
Dept: FAMILY MEDICINE CLINIC | Facility: CLINIC | Age: 59
End: 2019-02-19

## 2019-02-19 NOTE — TELEPHONE ENCOUNTER
[facility] CALLED REQUESTING A FAX COPY OF PTS EKG    PLEASE FAX TO        FAX  [phone]

## 2019-02-20 ENCOUNTER — TELEPHONE (OUTPATIENT)
Dept: NEUROLOGY | Facility: CLINIC | Age: 59
End: 2019-02-20

## 2019-02-20 DIAGNOSIS — G43.101 MIGRAINE WITH AURA AND WITH STATUS MIGRAINOSUS, NOT INTRACTABLE: ICD-10-CM

## 2019-02-20 RX ORDER — VERAPAMIL HYDROCHLORIDE 80 MG/1
80 TABLET ORAL 3 TIMES DAILY
Qty: 270 TABLET | Refills: 0 | Status: SHIPPED | OUTPATIENT
Start: 2019-02-20 | End: 2019-03-19

## 2019-02-20 NOTE — TELEPHONE ENCOUNTER
Medication: Verapamil     Date of last refill: 1/7/19 for #90/0 additional refills  [place]    Last office visit: 8/13/18  Due back to clinic per last office note:  4 months  Date next office visit scheduled:  2/21/1

## 2019-02-27 DIAGNOSIS — Q64.4 URACHAL CYST: ICD-10-CM

## 2019-02-27 DIAGNOSIS — G43.009 MIGRAINE WITHOUT AURA AND WITHOUT STATUS MIGRAINOSUS, NOT INTRACTABLE: ICD-10-CM

## 2019-02-27 DIAGNOSIS — R19.8 UMBILICAL BLEEDING: ICD-10-CM

## 2019-02-27 RX ORDER — HYDROCODONE BITARTRATE AND ACETAMINOPHEN 7.5; 325 MG/1; MG/1
TABLET ORAL
Qty: 180 TABLET | Refills: 0 | Status: SHIPPED | OUTPATIENT
Start: 2019-02-27 | End: 2019-03-26

## 2019-02-27 NOTE — TELEPHONE ENCOUNTER
LOV: 2/14/19   Last Refill: 1/21/19  #180 0 RF    Future Appointments   Date Time Provider Shaggy Pintoisti   3/7/2019  4:30 PM Ana Gonzalez MD [facility] NP [facility] Still   4/19/2019  3:00 PM Chino Fofana DO [facility]UIS FERNANDO Ford

## 2019-03-02 RX ORDER — ROSUVASTATIN CALCIUM 10 MG/1
TABLET, COATED ORAL
Qty: 90 TABLET | Refills: 1 | Status: SHIPPED | OUTPATIENT
Start: 2019-03-02 | End: 2019-08-05

## 2019-03-02 NOTE — TELEPHONE ENCOUNTER
Last refill: 7- #90 with 1 refill  Last Visit: 2-  Next Visit:    No Future Appointments         Forward to Dr. Torito Kate please advise on refills. Thanks.

## 2019-03-05 RX ORDER — CARISOPRODOL 350 MG/1
TABLET ORAL
Qty: 120 TABLET | Refills: 0 | Status: SHIPPED | OUTPATIENT
Start: 2019-03-05 | End: 2019-04-04

## 2019-03-05 NOTE — TELEPHONE ENCOUNTER
PT CALLED AND ADV THAT HE NEEDS REFILL OF     Carisoprodol 350 MG Oral Tab    PLEASE SEND TO [facility]    THANK YOU

## 2019-03-05 NOTE — TELEPHONE ENCOUNTER
LOV: 2/14/19  Last Refill: 1/21/19  #120 0 RF    Future Appointments   Date Time Provider Shaggy Eden   3/6/2019  5:30 PM CHARISSE Huff PT AT [facility]   3/7/2019  4:30 PM Debbie Marroquin MD [facility] NP [facility]   3/13/2019  6:00 PM

## 2019-03-06 ENCOUNTER — TELEPHONE (OUTPATIENT)
Dept: FAMILY MEDICINE CLINIC | Facility: CLINIC | Age: 59
End: 2019-03-06

## 2019-03-06 NOTE — TELEPHONE ENCOUNTER
Moriah Lane from [employer] called, Carisoprodol 350 MG Oral Tab is on back order. Please send refill request to the [facility]. Please call pt when this has been done.

## 2019-03-14 DIAGNOSIS — G43.101 MIGRAINE WITH AURA AND WITH STATUS MIGRAINOSUS, NOT INTRACTABLE: ICD-10-CM

## 2019-03-14 RX ORDER — SUMATRIPTAN 100 MG/1
TABLET, FILM COATED ORAL
Qty: 9 TABLET | Refills: 0 | Status: SHIPPED | OUTPATIENT
Start: 2019-03-14 | End: 2019-04-19

## 2019-03-14 NOTE — TELEPHONE ENCOUNTER
Medication: Sumatriptan    Date of last refill: 1/22/19 for #9/0 additional refills  [place]    Last office visit: 8/13/18  Due back to clinic per last office note:  4 months  Date next office visit scheduled:  3/19/

## 2019-03-19 ENCOUNTER — OFFICE VISIT (OUTPATIENT)
Dept: NEUROLOGY | Facility: CLINIC | Age: 59
End: 2019-03-19
Payer: COMMERCIAL

## 2019-03-19 VITALS
HEART RATE: 82 BPM | WEIGHT: 230 LBS | BODY MASS INDEX: 31 KG/M2 | SYSTOLIC BLOOD PRESSURE: 126 MMHG | RESPIRATION RATE: 16 BRPM | DIASTOLIC BLOOD PRESSURE: 78 MMHG

## 2019-03-19 DIAGNOSIS — G43.009 MIGRAINE WITHOUT AURA AND WITHOUT STATUS MIGRAINOSUS, NOT INTRACTABLE: Primary | ICD-10-CM

## 2019-03-19 DIAGNOSIS — G43.101 MIGRAINE WITH AURA AND WITH STATUS MIGRAINOSUS, NOT INTRACTABLE: ICD-10-CM

## 2019-03-19 PROCEDURE — 96372 THER/PROPH/DIAG INJ SC/IM: CPT | Performed by: OTHER

## 2019-03-19 PROCEDURE — 99213 OFFICE O/P EST LOW 20 MIN: CPT | Performed by: OTHER

## 2019-03-19 RX ORDER — TOPIRAMATE 50 MG/1
200 TABLET, FILM COATED ORAL 2 TIMES DAILY
Qty: 240 TABLET | Refills: 5 | Status: SHIPPED | OUTPATIENT
Start: 2019-03-19 | End: 2019-07-22

## 2019-03-19 RX ORDER — VERAPAMIL HYDROCHLORIDE 80 MG/1
80 TABLET ORAL 3 TIMES DAILY
Qty: 270 TABLET | Refills: 5 | Status: SHIPPED | OUTPATIENT
Start: 2019-03-19 | End: 2020-04-09

## 2019-03-19 RX ORDER — DEXAMETHASONE SODIUM PHOSPHATE 10 MG/ML
10 INJECTION, SOLUTION INTRAMUSCULAR; INTRAVENOUS ONCE
Status: COMPLETED | OUTPATIENT
Start: 2019-03-19 | End: 2019-03-19

## 2019-03-19 RX ORDER — KETOROLAC TROMETHAMINE 30 MG/ML
30 INJECTION, SOLUTION INTRAMUSCULAR; INTRAVENOUS ONCE
Status: COMPLETED | OUTPATIENT
Start: 2019-03-19 | End: 2019-03-19

## 2019-03-19 RX ADMIN — DEXAMETHASONE SODIUM PHOSPHATE 10 MG: 10 INJECTION, SOLUTION INTRAMUSCULAR; INTRAVENOUS at 11:09:00

## 2019-03-19 RX ADMIN — KETOROLAC TROMETHAMINE 30 MG: 30 INJECTION, SOLUTION INTRAMUSCULAR; INTRAVENOUS at 11:10:00

## 2019-03-19 NOTE — PROGRESS NOTES
Toradol 30mg and Decadron 10mg IM injections given per written order. See MAR for admin details. Consent form signed prior to injections. Pt tolerated well.

## 2019-03-19 NOTE — PROGRESS NOTES
Neurology H&P    Say Sergeant Patient Status:  No patient class for patient encounter    3/12/1960 MRN [id number]   Location [facility] Attending No att. providers found   Hosp Day #  PCP Virginia Cross DO     Subjective:  Initial Clinic HPI and that this has been continuous. He has not had a headaches that lasted this long in a very long time. He states that he is very happy with his current migraines prophylaxis. Denies any new numbness weakness or tinglign.         Current Medications:    Cu joint/DCE sx 2-21-19/Global Exp 5-21-19/TRK/MICKIE      PMHx:  Past Medical History:   Diagnosis Date   • Anesthesia complication     wakes up combative, violent according to patient   • Attention deficit hyperactivity disorder (ADHD)     NO MEDS   • Back prob Mother    • Hypertension Mother    • Lipids Mother         Hyperlipidemia   • Heart Disease Father    • Hypertension Father    • Lipids Father         Hyperlipidemia   • Diabetes Father        ROS:  Gen: no unexplained weight loss  Vision: no vision change Mild scattered punctate and patchy T2 hyperintense signal within the periventricular and subcortical white matter likely represents mild chronic small vessel ischemic disease and/or the sequelae of migraine headaches.   3. 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    As

## 2019-03-26 DIAGNOSIS — R19.8 UMBILICAL BLEEDING: ICD-10-CM

## 2019-03-26 DIAGNOSIS — G43.009 MIGRAINE WITHOUT AURA AND WITHOUT STATUS MIGRAINOSUS, NOT INTRACTABLE: ICD-10-CM

## 2019-03-26 DIAGNOSIS — Q64.4 URACHAL CYST: ICD-10-CM

## 2019-03-26 RX ORDER — HYDROCODONE BITARTRATE AND ACETAMINOPHEN 7.5; 325 MG/1; MG/1
TABLET ORAL
Qty: 180 TABLET | Refills: 0 | Status: SHIPPED | OUTPATIENT
Start: 2019-03-26 | End: 2019-04-25

## 2019-03-26 NOTE — TELEPHONE ENCOUNTER
LOV: 2/14/19  Last Refill: 2/27/19 #180 0 RF    Future Appointments   Date Time Provider Shaggy Harmon   3/27/2019  4:30 PM Tammy Tucker PT [facility] Cesar Harrell AT [facility]   4/3/2019  4:30 PM CHARISSE Staton PT TER[facility] AT [facility]   4/4/2019  4:20 PM Rafia Faulkner

## 2019-03-26 NOTE — TELEPHONE ENCOUNTER
PT CALLED AND ADV NEEDS REFILL OF     HYDROcodone-acetaminophen 7.5-325 MG Oral Tab    PLEASE CALL WHEN READY FOR P/U    THANK YOU

## 2019-04-01 ENCOUNTER — OFFICE VISIT (OUTPATIENT)
Dept: FAMILY MEDICINE CLINIC | Facility: CLINIC | Age: 59
End: 2019-04-01
Payer: COMMERCIAL

## 2019-04-01 VITALS
DIASTOLIC BLOOD PRESSURE: 76 MMHG | HEART RATE: 72 BPM | WEIGHT: 232.81 LBS | TEMPERATURE: 98 F | SYSTOLIC BLOOD PRESSURE: 132 MMHG | RESPIRATION RATE: 16 BRPM | BODY MASS INDEX: 32 KG/M2

## 2019-04-01 DIAGNOSIS — R03.0 ELEVATED BLOOD PRESSURE READING: ICD-10-CM

## 2019-04-01 DIAGNOSIS — R07.89 CHEST TIGHTNESS OR PRESSURE: Primary | ICD-10-CM

## 2019-04-01 DIAGNOSIS — I49.8 VENTRICULAR BIGEMINY: ICD-10-CM

## 2019-04-01 PROCEDURE — 93000 ELECTROCARDIOGRAM COMPLETE: CPT | Performed by: FAMILY MEDICINE

## 2019-04-01 PROCEDURE — 99214 OFFICE O/P EST MOD 30 MIN: CPT | Performed by: FAMILY MEDICINE

## 2019-04-01 RX ORDER — RANITIDINE 300 MG/1
300 TABLET ORAL
COMMUNITY
End: 2021-01-20

## 2019-04-01 NOTE — PROGRESS NOTES
Coral Perez is a 64year old male. HPI:   Manish Cummingsrhea is here for follow up on what he thinks might be a blood pressure issue?  He gets a head pressure issue and into his chest and head, his head will start to pound and then he has to sit for 30 minutes, and hyperactivity disorder (ADHD)     NO MEDS   • Back problem    • Depression     AFTER THE BACK SURGERY WITH POOR RESULTS   • Diverticulosis of colon (without mention of hemorrhage)    • High cholesterol    • Hyperlipidemia    • Migraines    • Neuropathy Imaging & Consults:  ELECTROCARDIOGRAM, COMPLETE  CARD MONITOR 30 DAY EVENT   SHOWS SINUS RHYTHM WITH BIGEMINY AND NON SPECIFIC ST CHANGES  The patient indicates understanding of these issues and agrees to the plan.   The patient is asked to return in

## 2019-04-01 NOTE — PROGRESS NOTES
Patient reports approx 3 weeks ago, has felt like he is \"full of pressure\". This happened the other night and took BP (169/109, then 172/112); laid down for a little bit, could feel pressure go away within the next 30 minutes; took BP again (137/63).  Tapan

## 2019-04-04 RX ORDER — CARISOPRODOL 350 MG/1
TABLET ORAL
Qty: 120 TABLET | Refills: 0 | Status: SHIPPED | OUTPATIENT
Start: 2019-04-04 | End: 2019-05-03

## 2019-04-04 NOTE — TELEPHONE ENCOUNTER
Carisoprodol 350 MG Oral Tab 120 tablet 0 3/5/2019    Sig :  TAKE 1 TABLET BY MOUTH FOUR TIMES DAILY AS NEEDED FOR MUSCLE SPASMS       [facility]

## 2019-04-04 NOTE — TELEPHONE ENCOUNTER
LOV: 4/1/19  Last Refill: 3/5/19 #120 0 RF    Future Appointments   Date Time Provider Shaggy Harmon   4/4/2019  4:20 PM Tree Acevedo MD MMO NP [facility] [place] [address]   4/10/2019  4:30 PM Lisa Navarro PT [facility] PARTT AT [facility]   4/19/2019  7:30 AM OSIEL

## 2019-04-19 ENCOUNTER — HOSPITAL ENCOUNTER (OUTPATIENT)
Dept: CV DIAGNOSTICS | Age: 59
Discharge: HOME | End: 2019-04-19
Attending: FAMILY MEDICINE
Payer: COMMERCIAL

## 2019-04-19 DIAGNOSIS — I49.8 VENTRICULAR BIGEMINY: ICD-10-CM

## 2019-04-19 DIAGNOSIS — R07.89 CHEST TIGHTNESS OR PRESSURE: ICD-10-CM

## 2019-04-19 DIAGNOSIS — G43.101 MIGRAINE WITH AURA AND WITH STATUS MIGRAINOSUS, NOT INTRACTABLE: ICD-10-CM

## 2019-04-19 DIAGNOSIS — R03.0 ELEVATED BLOOD PRESSURE READING: ICD-10-CM

## 2019-04-19 PROCEDURE — 93271 ECG/MONITORING AND ANALYSIS: CPT | Performed by: FAMILY MEDICINE

## 2019-04-19 PROCEDURE — 93272 ECG/REVIEW INTERPRET ONLY: CPT | Performed by: FAMILY MEDICINE

## 2019-04-19 PROCEDURE — 93270 REMOTE 30 DAY ECG REV/REPORT: CPT | Performed by: FAMILY MEDICINE

## 2019-04-19 RX ORDER — SUMATRIPTAN 100 MG/1
TABLET, FILM COATED ORAL
Qty: 9 TABLET | Refills: 3 | Status: SHIPPED | OUTPATIENT
Start: 2019-04-19 | End: 2019-12-23

## 2019-04-19 NOTE — TELEPHONE ENCOUNTER
Medication: Imitrex    Date of last refill: 3/14/19 (#9/0)  [place]    Last office visit: 3/19/2019  Due back to clinic per last office note:  6 months  Date next office visit scheduled:    Future Appointments   Date

## 2019-04-25 DIAGNOSIS — R19.8 UMBILICAL BLEEDING: ICD-10-CM

## 2019-04-25 DIAGNOSIS — G43.009 MIGRAINE WITHOUT AURA AND WITHOUT STATUS MIGRAINOSUS, NOT INTRACTABLE: ICD-10-CM

## 2019-04-25 DIAGNOSIS — Q64.4 URACHAL CYST: ICD-10-CM

## 2019-04-25 RX ORDER — HYDROCODONE BITARTRATE AND ACETAMINOPHEN 7.5; 325 MG/1; MG/1
TABLET ORAL
Qty: 180 TABLET | Refills: 0 | Status: SHIPPED | OUTPATIENT
Start: 2019-04-25 | End: 2019-05-24

## 2019-04-25 NOTE — TELEPHONE ENCOUNTER
LOV: 4/1/19   Last Refill: 3/26/19  #180 0 RF    Future Appointments   Date Time Provider Shaggy Harmon   5/16/2019  4:10 PM Luther Willams MD MMO NP [facility] Aleksandr

## 2019-05-03 NOTE — TELEPHONE ENCOUNTER
Carisoprodol 350 MG Oral Tab 120 tablet 0 4/4/2019    Sig:   TAKE 1 TABLET BY MOUTH FOUR TIMES DAILY AS NEEDED FOR MUSCLE SPASMS       [facility]

## 2019-05-03 NOTE — TELEPHONE ENCOUNTER
Last refilled on 4/4/19 for # 120 with 0 refills  Last OV 4/1/19  Future Appointments   Date Time Provider Shaggy Eden   5/16/2019  4:10 PM Mahesh Downey MD MMO NP JACEK Jay Morning        Thank you.

## 2019-05-04 NOTE — TELEPHONE ENCOUNTER
LOV: 4/1/19   Last Refill: 4/4/19 120 0 RF    Future Appointments   Date Time Provider Shaggy Eden   5/16/2019  4:10 PM Natacha Berry MD [facility] NP [facility] Land

## 2019-05-06 RX ORDER — CARISOPRODOL 350 MG/1
TABLET ORAL
Qty: 120 TABLET | Refills: 0 | Status: SHIPPED
Start: 2019-05-06 | End: 2019-06-03

## 2019-05-06 RX ORDER — CARISOPRODOL 350 MG/1
TABLET ORAL
Qty: 120 TABLET | Refills: 0 | Status: SHIPPED | OUTPATIENT
Start: 2019-05-06 | End: 2019-09-28

## 2019-05-17 ENCOUNTER — TELEPHONE (OUTPATIENT)
Dept: FAMILY MEDICINE CLINIC | Facility: CLINIC | Age: 59
End: 2019-05-17

## 2019-05-17 NOTE — TELEPHONE ENCOUNTER
Call from Mariaelena Johnson at [address] [place] [address] that manages their Holter monitors contacted her to let her know that there had been some critical results that were not reported. On April 21 patient had 90 second bout of afib.   They sergio

## 2019-05-18 DIAGNOSIS — I48.0 PAROXYSMAL ATRIAL FIBRILLATION (HCC): Primary | ICD-10-CM

## 2019-05-18 NOTE — TELEPHONE ENCOUNTER
Patient notified and verbalized understanding. States he does not take relafen but he does take naproxen. Advised to stop naproxen or any other NSAID like ibuprofen or motrin. Nurse visit scheduled for labs Monday 5/20.

## 2019-05-18 NOTE — TELEPHONE ENCOUNTER
Notified of results started on xarelto 20 mg a day and needs to set up an appt with EP, Dr. Flor Gamboa at [facility], also [facility], MAY CAUSE GI BLEED

## 2019-05-20 ENCOUNTER — NURSE ONLY (OUTPATIENT)
Dept: FAMILY MEDICINE CLINIC | Facility: CLINIC | Age: 59
End: 2019-05-20
Payer: COMMERCIAL

## 2019-05-20 DIAGNOSIS — I48.0 PAROXYSMAL ATRIAL FIBRILLATION (HCC): ICD-10-CM

## 2019-05-20 PROCEDURE — 84439 ASSAY OF FREE THYROXINE: CPT | Performed by: FAMILY MEDICINE

## 2019-05-20 PROCEDURE — 80050 GENERAL HEALTH PANEL: CPT | Performed by: FAMILY MEDICINE

## 2019-05-20 PROCEDURE — 36415 COLL VENOUS BLD VENIPUNCTURE: CPT | Performed by: FAMILY MEDICINE

## 2019-05-20 NOTE — PROGRESS NOTES
Pt was in office for labs for DS    1 mint and 1 lavender tube collected from [facility] using straight needle and 1 attempt    Pt tolerated and was sent home in stable condition

## 2019-05-22 ENCOUNTER — TELEPHONE (OUTPATIENT)
Dept: FAMILY MEDICINE CLINIC | Facility: CLINIC | Age: 59
End: 2019-05-22

## 2019-05-22 NOTE — TELEPHONE ENCOUNTER
His kidney issues may be related to the cyst, but they generally don;t remove those, and this is at the very bottom and not compressing or  Involved in the body of the kidney. Also if he was working hard.  That may contribute to an elevated BUN, as far as w

## 2019-05-22 NOTE — TELEPHONE ENCOUNTER
PT was advised of information below- verbalized understanding    Pt wanted to know the name of the heart condition he has? Pt states DS told him but he was so overwhelmed that day with the information that he forgot and his wife is hounding him.   Pt state

## 2019-05-22 NOTE — TELEPHONE ENCOUNTER
----- Message from Dangelo George DO sent at 5/22/2019  8:28 AM CDT -----  Can notify Edwige Carvalho that overall his labs looked good he is not anemic, no sign of infection, his thyroid was normal

## 2019-05-22 NOTE — TELEPHONE ENCOUNTER
Pt returned call- was advised of results verbalized understanding    Pt states he is concerned about the elevated BUN. Pt states he is very hydrated and is concerned that this is contributing to his heart issue.  Pt also states he has a large kidney cyst a

## 2019-05-23 NOTE — TELEPHONE ENCOUNTER
Well someone called and said he had atrial fibrillation on the interrogation they did for what he had, so I don;t think so

## 2019-05-23 NOTE — TELEPHONE ENCOUNTER
Westley Lynn called from [facility]-wants to make sure that Dr. Thais Kenny does not want pt to repeat wearing the heart monitor due to lack of information from the initial session. Please call Westley Lynn at [phone].

## 2019-05-24 DIAGNOSIS — Q64.4 URACHAL CYST: ICD-10-CM

## 2019-05-24 DIAGNOSIS — G43.009 MIGRAINE WITHOUT AURA AND WITHOUT STATUS MIGRAINOSUS, NOT INTRACTABLE: ICD-10-CM

## 2019-05-24 DIAGNOSIS — R19.8 UMBILICAL BLEEDING: ICD-10-CM

## 2019-05-24 RX ORDER — HYDROCODONE BITARTRATE AND ACETAMINOPHEN 7.5; 325 MG/1; MG/1
TABLET ORAL
Qty: 24 TABLET | Refills: 0 | Status: SHIPPED | OUTPATIENT
Start: 2019-05-24 | End: 2019-09-23

## 2019-05-24 NOTE — TELEPHONE ENCOUNTER
Last refilled on 4/25/19 for # 180 with 0 refills  Last OV 4/1/19  No future appointments. Thank you.

## 2019-05-24 NOTE — TELEPHONE ENCOUNTER
Pt stopped in to see if script might be ready. He is leaving to go to [place] in 1 1/2 hours. Pt unaware Dr. Nova Leyden not in today. Joleen chance this might get printed in the next 1 1/2 hours?

## 2019-05-30 ENCOUNTER — TELEPHONE (OUTPATIENT)
Dept: NEUROLOGY | Facility: CLINIC | Age: 59
End: 2019-05-30

## 2019-05-30 DIAGNOSIS — G43.009 MIGRAINE WITHOUT AURA AND WITHOUT STATUS MIGRAINOSUS, NOT INTRACTABLE: ICD-10-CM

## 2019-05-30 DIAGNOSIS — R19.8 UMBILICAL BLEEDING: ICD-10-CM

## 2019-05-30 DIAGNOSIS — Q64.4 URACHAL CYST: ICD-10-CM

## 2019-05-30 RX ORDER — HYDROCODONE BITARTRATE AND ACETAMINOPHEN 7.5; 325 MG/1; MG/1
TABLET ORAL
Qty: 180 TABLET | Refills: 0 | Status: SHIPPED | OUTPATIENT
Start: 2019-05-30 | End: 2019-06-26

## 2019-05-30 NOTE — TELEPHONE ENCOUNTER
Called pharmacy who verifies patient does have refills on file. Patient informed.   Medication: Imitrex    Date of last refill: 4/19/19 (#9/3)  [place]    Last office visit: 3/19/2019  Due back to clinic per last offic

## 2019-05-30 NOTE — TELEPHONE ENCOUNTER
HYDROcodone-acetaminophen 7.5-325 MG Oral Tab 24 tablet 0 2019    Si-2 po qid prn pain       Patient will  script.

## 2019-06-03 RX ORDER — CARISOPRODOL 350 MG/1
TABLET ORAL
Qty: 120 TABLET | Refills: 0 | Status: SHIPPED
Start: 2019-06-03 | End: 2019-07-02

## 2019-06-03 NOTE — TELEPHONE ENCOUNTER
Last refilled on 5/6/19 for # 120 with 0 refills  Last OV 4/1/19  No future appointments. Thank you.

## 2019-06-13 RX ORDER — CARISOPRODOL 350 MG/1
TABLET ORAL
COMMUNITY
Start: 2019-05-06

## 2019-06-13 RX ORDER — BUTALBITAL, ACETAMINOPHEN AND CAFFEINE 300; 40; 50 MG/1; MG/1; MG/1
1-2 CAPSULE ORAL
COMMUNITY
End: 2019-06-14 | Stop reason: CLARIF

## 2019-06-13 RX ORDER — ATORVASTATIN CALCIUM 20 MG/1
TABLET, FILM COATED ORAL
COMMUNITY
Start: 2005-10-18 | End: 2021-03-17

## 2019-06-13 RX ORDER — DULOXETIN HYDROCHLORIDE 60 MG/1
CAPSULE, DELAYED RELEASE ORAL
COMMUNITY
Start: 2018-11-10

## 2019-06-13 RX ORDER — RIMANTADINE HCL 100 MG
TABLET ORAL
COMMUNITY
Start: 2005-02-06 | End: 2019-06-14 | Stop reason: CLARIF

## 2019-06-13 RX ORDER — PREGABALIN 150 MG/1
150 CAPSULE ORAL
COMMUNITY
Start: 2011-08-28 | End: 2019-06-14 | Stop reason: CLARIF

## 2019-06-13 RX ORDER — RANITIDINE 150 MG/1
150 CAPSULE ORAL
COMMUNITY
End: 2020-03-11

## 2019-06-13 RX ORDER — PROPRANOLOL HYDROCHLORIDE 80 MG/1
80 CAPSULE, EXTENDED RELEASE ORAL
COMMUNITY
Start: 2017-05-07 | End: 2019-06-14

## 2019-06-13 RX ORDER — DIAZEPAM 5 MG/1
TABLET ORAL
COMMUNITY
Start: 2004-08-13 | End: 2019-06-14 | Stop reason: CLARIF

## 2019-06-13 RX ORDER — SUMATRIPTAN 50 MG/1
50 TABLET, FILM COATED ORAL
COMMUNITY

## 2019-06-13 RX ORDER — FLUTICASONE PROPIONATE AND SALMETEROL 500; 50 UG/1; UG/1
POWDER RESPIRATORY (INHALATION)
COMMUNITY
Start: 2004-09-29 | End: 2019-06-14 | Stop reason: CLARIF

## 2019-06-13 RX ORDER — PANTOPRAZOLE SODIUM 40 MG/1
TABLET, DELAYED RELEASE ORAL
COMMUNITY
Start: 2006-05-05 | End: 2019-09-25 | Stop reason: CLARIF

## 2019-06-13 RX ORDER — CITALOPRAM 20 MG/1
20 TABLET ORAL
COMMUNITY
End: 2019-06-14 | Stop reason: CLARIF

## 2019-06-14 ENCOUNTER — OFFICE VISIT (OUTPATIENT)
Dept: CARDIOLOGY | Age: 59
End: 2019-06-14

## 2019-06-14 ENCOUNTER — APPOINTMENT (OUTPATIENT)
Dept: CARDIOLOGY | Age: 59
End: 2019-06-14

## 2019-06-14 ENCOUNTER — HOSPITAL ENCOUNTER (OUTPATIENT)
Dept: CV DIAGNOSTICS | Age: 59
Discharge: HOME | End: 2019-06-14
Attending: INTERNAL MEDICINE
Payer: COMMERCIAL

## 2019-06-14 VITALS
HEIGHT: 73 IN | DIASTOLIC BLOOD PRESSURE: 90 MMHG | BODY MASS INDEX: 29.55 KG/M2 | SYSTOLIC BLOOD PRESSURE: 140 MMHG | HEART RATE: 64 BPM | WEIGHT: 223 LBS

## 2019-06-14 DIAGNOSIS — R07.89 ATYPICAL CHEST PAIN: ICD-10-CM

## 2019-06-14 DIAGNOSIS — Z86.79 HISTORY OF ATRIAL FIBRILLATION: ICD-10-CM

## 2019-06-14 DIAGNOSIS — E78.00 HIGH CHOLESTEROL: ICD-10-CM

## 2019-06-14 DIAGNOSIS — R07.89 ATYPICAL CHEST PAIN: Primary | ICD-10-CM

## 2019-06-14 PROCEDURE — 99245 OFF/OP CONSLTJ NEW/EST HI 55: CPT | Performed by: INTERNAL MEDICINE

## 2019-06-14 PROCEDURE — 93306 TTE W/DOPPLER COMPLETE: CPT | Performed by: INTERNAL MEDICINE

## 2019-06-14 RX ORDER — OMEGA-3 FATTY ACIDS/FISH OIL 300-1000MG
1000 CAPSULE ORAL DAILY
COMMUNITY

## 2019-06-14 RX ORDER — METOPROLOL SUCCINATE 25 MG/1
25 TABLET, EXTENDED RELEASE ORAL DAILY
Qty: 30 TABLET | Refills: 6 | Status: SHIPPED | OUTPATIENT
Start: 2019-06-14 | End: 2019-06-18 | Stop reason: SDUPTHER

## 2019-06-14 RX ORDER — MAGNESIUM CHLORIDE 64 MG
TABLET, DELAYED RELEASE (ENTERIC COATED) ORAL
COMMUNITY

## 2019-06-14 SDOH — HEALTH STABILITY: MENTAL HEALTH
STRESS IS WHEN SOMEONE FEELS TENSE, NERVOUS, ANXIOUS, OR CAN'T SLEEP AT NIGHT BECAUSE THEIR MIND IS TROUBLED. HOW STRESSED ARE YOU?: VERY MUCH

## 2019-06-14 ASSESSMENT — ENCOUNTER SYMPTOMS
WEIGHT LOSS: 1
BACK PAIN: 1

## 2019-06-18 RX ORDER — METOPROLOL SUCCINATE 25 MG/1
25 TABLET, EXTENDED RELEASE ORAL DAILY
Qty: 90 TABLET | Refills: 3 | Status: SHIPPED | OUTPATIENT
Start: 2019-06-18 | End: 2020-05-22

## 2019-06-20 ENCOUNTER — TELEPHONE (OUTPATIENT)
Dept: CARDIOLOGY | Age: 59
End: 2019-06-20

## 2019-06-26 DIAGNOSIS — R19.8 UMBILICAL BLEEDING: ICD-10-CM

## 2019-06-26 DIAGNOSIS — G43.009 MIGRAINE WITHOUT AURA AND WITHOUT STATUS MIGRAINOSUS, NOT INTRACTABLE: ICD-10-CM

## 2019-06-26 DIAGNOSIS — Q64.4 URACHAL CYST: ICD-10-CM

## 2019-06-26 RX ORDER — HYDROCODONE BITARTRATE AND ACETAMINOPHEN 7.5; 325 MG/1; MG/1
TABLET ORAL
Qty: 180 TABLET | Refills: 0 | Status: SHIPPED | OUTPATIENT
Start: 2019-06-26 | End: 2019-07-24

## 2019-06-26 NOTE — TELEPHONE ENCOUNTER
Hpi Title: Evaluation of Skin Lesions LM for pt that script is ready to be picked up    Office phone number provided

## 2019-06-26 NOTE — TELEPHONE ENCOUNTER
Pt is going out of town this weekend, He will run out of [employer] [place] while out of town. He would like to know if he can get a refill    Can  later with week.

## 2019-07-02 RX ORDER — CARISOPRODOL 350 MG/1
TABLET ORAL
Qty: 120 TABLET | Refills: 0 | Status: SHIPPED | OUTPATIENT
Start: 2019-07-02 | End: 2019-07-31

## 2019-07-02 NOTE — TELEPHONE ENCOUNTER
LOV: 4/1/19   Last Refill: 6/3/19 #120 0 RF    Future Appointments   Date Time Provider Shaggy Harmon   7/11/2019  7:00  [address] [facility]hawn Garc[facility] - [facility]

## 2019-07-11 ENCOUNTER — HOSPITAL ENCOUNTER (OUTPATIENT)
Dept: CV DIAGNOSTICS | Facility: HOSPITAL | Age: 59
Discharge: HOME | End: 2019-07-11
Attending: INTERNAL MEDICINE
Payer: COMMERCIAL

## 2019-07-11 DIAGNOSIS — E78.00 ELEVATED CHOLESTEROL: ICD-10-CM

## 2019-07-11 DIAGNOSIS — R07.89 OTHER CHEST PAIN: ICD-10-CM

## 2019-07-11 DIAGNOSIS — Z86.79 HISTORY OF ATRIAL FIBRILLATION: ICD-10-CM

## 2019-07-11 PROCEDURE — 78452 HT MUSCLE IMAGE SPECT MULT: CPT | Performed by: INTERNAL MEDICINE

## 2019-07-11 PROCEDURE — 93017 CV STRESS TEST TRACING ONLY: CPT | Performed by: INTERNAL MEDICINE

## 2019-07-11 PROCEDURE — 93018 CV STRESS TEST I&R ONLY: CPT | Performed by: INTERNAL MEDICINE

## 2019-07-15 RX ORDER — RIVAROXABAN 20 MG/1
TABLET, FILM COATED ORAL
Qty: 30 TABLET | Refills: 3 | Status: SHIPPED | OUTPATIENT
Start: 2019-07-15 | End: 2021-01-20

## 2019-07-16 ENCOUNTER — E-ADVICE (OUTPATIENT)
Dept: CARDIOLOGY | Age: 59
End: 2019-07-16

## 2019-07-22 DIAGNOSIS — G43.101 MIGRAINE WITH AURA AND WITH STATUS MIGRAINOSUS, NOT INTRACTABLE: ICD-10-CM

## 2019-07-22 RX ORDER — TOPIRAMATE 100 MG/1
200 TABLET, FILM COATED ORAL 2 TIMES DAILY
Qty: 360 TABLET | Refills: 0 | Status: SHIPPED | OUTPATIENT
Start: 2019-07-22 | End: 2019-10-01

## 2019-07-22 NOTE — TELEPHONE ENCOUNTER
Patient informed he will need an appointment for refill. 9/17/19 OV scheduled. Patient states RX needs to go through mail order due to insurance. Patient agreeable to having 100 mg tablets instead of 50 mg so he can take less tablets per day.     Medication

## 2019-07-24 ENCOUNTER — TELEPHONE (OUTPATIENT)
Dept: NEUROLOGY | Facility: CLINIC | Age: 59
End: 2019-07-24

## 2019-07-24 ENCOUNTER — OFFICE VISIT (OUTPATIENT)
Dept: FAMILY MEDICINE CLINIC | Facility: CLINIC | Age: 59
End: 2019-07-24
Payer: COMMERCIAL

## 2019-07-24 ENCOUNTER — HOSPITAL ENCOUNTER (OUTPATIENT)
Dept: GENERAL RADIOLOGY | Age: 59
Discharge: HOME | End: 2019-07-24
Attending: FAMILY MEDICINE
Payer: COMMERCIAL

## 2019-07-24 VITALS
DIASTOLIC BLOOD PRESSURE: 80 MMHG | BODY MASS INDEX: 30 KG/M2 | HEART RATE: 54 BPM | WEIGHT: 224.63 LBS | SYSTOLIC BLOOD PRESSURE: 120 MMHG | TEMPERATURE: 98 F | RESPIRATION RATE: 14 BRPM

## 2019-07-24 DIAGNOSIS — M25.561 ACUTE PAIN OF RIGHT KNEE: ICD-10-CM

## 2019-07-24 DIAGNOSIS — Q64.4 URACHAL CYST: ICD-10-CM

## 2019-07-24 DIAGNOSIS — S83.241A OTHER TEAR OF MEDIAL MENISCUS OF RIGHT KNEE AS CURRENT INJURY, INITIAL ENCOUNTER: ICD-10-CM

## 2019-07-24 DIAGNOSIS — G43.009 MIGRAINE WITHOUT AURA AND WITHOUT STATUS MIGRAINOSUS, NOT INTRACTABLE: ICD-10-CM

## 2019-07-24 DIAGNOSIS — M25.561 ACUTE PAIN OF RIGHT KNEE: Primary | ICD-10-CM

## 2019-07-24 DIAGNOSIS — G43.101 MIGRAINE WITH AURA AND WITH STATUS MIGRAINOSUS, NOT INTRACTABLE: Primary | ICD-10-CM

## 2019-07-24 DIAGNOSIS — R19.8 UMBILICAL BLEEDING: ICD-10-CM

## 2019-07-24 PROBLEM — Z86.79 HISTORY OF ATRIAL FIBRILLATION: Status: ACTIVE | Noted: 2019-06-14

## 2019-07-24 PROCEDURE — 73562 X-RAY EXAM OF KNEE 3: CPT | Performed by: FAMILY MEDICINE

## 2019-07-24 PROCEDURE — 99214 OFFICE O/P EST MOD 30 MIN: CPT | Performed by: FAMILY MEDICINE

## 2019-07-24 RX ORDER — DULOXETIN HYDROCHLORIDE 60 MG/1
CAPSULE, DELAYED RELEASE ORAL
Qty: 180 CAPSULE | Refills: 2 | COMMUNITY
Start: 2019-07-24 | End: 2019-08-17

## 2019-07-24 RX ORDER — HYDROCODONE BITARTRATE AND ACETAMINOPHEN 7.5; 325 MG/1; MG/1
TABLET ORAL
Qty: 180 TABLET | Refills: 0 | Status: SHIPPED | OUTPATIENT
Start: 2019-07-24 | End: 2019-08-23

## 2019-07-24 NOTE — PROGRESS NOTES
Carissa Acosta is a 64year old male.   HPI:   Shelby Parrish is here for evaluation of right knee pain  That started spontaneously about a month ago, he states it started to swell and he noted pain along the medial aspect and posterior knee, had previous scope done deficit hyperactivity disorder (ADHD)     NO MEDS   • Back problem    • Depression     AFTER THE BACK SURGERY WITH POOR RESULTS   • Diverticulosis of colon (without mention of hemorrhage)    • High cholesterol    • Hyperlipidemia    • Migraines    • Neurop bleeding    AVOID LADDERS AND EXCESSIVE STAIR CLIMBING, MAYNEED AN mri     Meds & Refills for this Visit:  Requested Prescriptions     Signed Prescriptions Disp Refills   • HYDROcodone-acetaminophen 7.5-325 MG Oral Tab 180 tablet 0     Si-2 po qid prn

## 2019-07-29 NOTE — TELEPHONE ENCOUNTER
Received Fax from [employer]      Topiramate -this medication or product is on your plan's list of covered drugs. Prior Authorization is not required at this time.      PA Reference #:[id number]

## 2019-07-30 ENCOUNTER — HOSPITAL ENCOUNTER (OUTPATIENT)
Age: 59
Discharge: HOME | End: 2019-07-30
Attending: FAMILY MEDICINE
Payer: COMMERCIAL

## 2019-07-30 ENCOUNTER — TELEPHONE (OUTPATIENT)
Dept: FAMILY MEDICINE CLINIC | Facility: CLINIC | Age: 59
End: 2019-07-30

## 2019-07-30 ENCOUNTER — APPOINTMENT (OUTPATIENT)
Dept: GENERAL RADIOLOGY | Age: 59
End: 2019-07-30
Payer: COMMERCIAL

## 2019-07-30 VITALS
OXYGEN SATURATION: 99 % | TEMPERATURE: 98 F | WEIGHT: 220 LBS | BODY MASS INDEX: 29.16 KG/M2 | HEIGHT: 73 IN | DIASTOLIC BLOOD PRESSURE: 89 MMHG | SYSTOLIC BLOOD PRESSURE: 159 MMHG | HEART RATE: 78 BPM | RESPIRATION RATE: 18 BRPM

## 2019-07-30 DIAGNOSIS — S92.425A CLOSED NONDISPLACED FRACTURE OF DISTAL PHALANX OF LEFT GREAT TOE, INITIAL ENCOUNTER: Primary | ICD-10-CM

## 2019-07-30 PROCEDURE — 73660 X-RAY EXAM OF TOE(S): CPT | Performed by: FAMILY MEDICINE

## 2019-07-30 PROCEDURE — 99213 OFFICE O/P EST LOW 20 MIN: CPT

## 2019-07-30 PROCEDURE — 28490 TREAT BIG TOE FRACTURE: CPT

## 2019-07-30 NOTE — ED NOTES
Pt discharged home. Toe covered with telfa and gissell. Post op shoe applied to lt foot. Pt verbalized understanding of instructions.

## 2019-07-30 NOTE — TELEPHONE ENCOUNTER
PT CALLED AND ADV THAT HE DROPPED A HUGE PIECE OF EQUIPMENT ON TOE ON Sunday. PT THINKS THAT ITS BROKE BUT THAT'S NOT WHATS BOTHERING HIM, PT HAS A BIG BLOOD BLISTER ON TOE THE SIZE OF A 1/2 DOLLAR. WORRIED ABOUT IT GETTING INFECTED.     LOOKING TO SEE I

## 2019-07-30 NOTE — ED PROVIDER NOTES
[place] [address] [facility] [place]   Patient presents with:  Lower Extremity Injury (musculoskeletal)    Stated Complaint: INJURY TO LEFT BIG TOE    HPI    *59-year-old male presents to the immediate care today with chief c Performed by Sorin Rudd MD at [address]      left knee    • OTHER      left ankle    • OTHER      vocal cord for polyps   • OTHER      VEIN LIGATION   • SHOULDER SURG PROC UNLISTED Left 02/21/2019   • VASECTOMY Labs Reviewed - No data to display      Xr Toe(s) (min 2 Views), Left 1st (cpt=73660)    Result Date: 7/30/2019  PROCEDURE:  XR TOE(S) (MIN 2 VIEWS), LEFT 1ST (CPT=73660)  TECHNIQUE:  Three views were obtained. COMPARISON:  None.   INDICATIONS:  INJURY T

## 2019-07-31 RX ORDER — CARISOPRODOL 350 MG/1
TABLET ORAL
Qty: 120 TABLET | Refills: 0 | Status: SHIPPED
Start: 2019-07-31 | End: 2019-08-28

## 2019-07-31 NOTE — TELEPHONE ENCOUNTER
Last refilled on 7/2/19 for # 120 with 0 refills  Last OV 7/24/19  Future Appointments   Date Time Provider Shaggy Harmon   9/17/2019  3:20 PM DO KARAN Simpson        Thank you.

## 2019-07-31 NOTE — TELEPHONE ENCOUNTER
Spoke with pharmacy, ensured technician was aware that no PA needed for Topamax. Pharmacy tech states refill will be processed.

## 2019-07-31 NOTE — TELEPHONE ENCOUNTER
Left message for pt informing him that pharmacy states medication is currently being processed. Encouraged pt to call office with any further questions or concerns.

## 2019-08-06 RX ORDER — ROSUVASTATIN CALCIUM 10 MG/1
TABLET, COATED ORAL
Qty: 90 TABLET | Refills: 1 | Status: SHIPPED | OUTPATIENT
Start: 2019-08-06 | End: 2019-12-26

## 2019-08-06 NOTE — TELEPHONE ENCOUNTER
LOV: 7/24/19   Last Refill: 3/2/19 #90 1 RF    Future Appointments   Date Time Provider Shaggy Harmon   9/17/2019  3:20 PM DO KARAN Marsh       Last Lipid:  Total Chol 206  4-29-17

## 2019-08-17 RX ORDER — DULOXETIN HYDROCHLORIDE 60 MG/1
CAPSULE, DELAYED RELEASE ORAL
Qty: 180 CAPSULE | Refills: 2 | Status: SHIPPED | OUTPATIENT
Start: 2019-08-17 | End: 2019-11-05

## 2019-08-17 NOTE — TELEPHONE ENCOUNTER
Last refilled on 7/24/19 for # 180 with 2 refills  Last OV 7/24/19  Future Appointments   Date Time Provider Shaggy Harmon   9/17/2019  3:20 PM DO KARAN Nieves        Thank you.

## 2019-08-22 DIAGNOSIS — Q64.4 URACHAL CYST: ICD-10-CM

## 2019-08-22 DIAGNOSIS — R19.8 UMBILICAL BLEEDING: ICD-10-CM

## 2019-08-22 DIAGNOSIS — G43.009 MIGRAINE WITHOUT AURA AND WITHOUT STATUS MIGRAINOSUS, NOT INTRACTABLE: ICD-10-CM

## 2019-08-22 NOTE — TELEPHONE ENCOUNTER
Pt called, needs refill on HYDROcodone-acetaminophen 7.5-325 MG Oral Tab.    Please call pt at [phone]   Pt will  script

## 2019-08-23 RX ORDER — HYDROCODONE BITARTRATE AND ACETAMINOPHEN 7.5; 325 MG/1; MG/1
TABLET ORAL
Qty: 24 TABLET | Refills: 0 | Status: CANCELLED | OUTPATIENT
Start: 2019-08-23

## 2019-08-23 RX ORDER — HYDROCODONE BITARTRATE AND ACETAMINOPHEN 7.5; 325 MG/1; MG/1
TABLET ORAL
Qty: 180 TABLET | Refills: 0 | Status: SHIPPED | OUTPATIENT
Start: 2019-08-23 | End: 2019-09-18

## 2019-08-23 NOTE — TELEPHONE ENCOUNTER
Last refilled on 5/24/19 for # 24 with 0 refills  Last OV 7/24/19  Future Appointments   Date Time Provider Shaggy Harmon   9/17/2019  3:20 PM DO KARAN Easley        Thank you.

## 2019-08-27 NOTE — PROGRESS NOTES
Neurosurgery Clinic Visit  2017    Tammi Mace PCP:  DO VASYL Crooks 3/12/1960 MRN [id number]       Chief Complaint:  Patient presents with:   Other: follow up for cyst      HISTORY OF PRESENT ILLNESS:  Tammi Mace is a(n) 49year old male AT ONSET OF HEADACHE.  MAY REPEAT EVERY 4 TO 6 HOURS AS NEEDED Disp: 45 capsule Rfl: 0   hydrocodone-acetaminophen 7.5-325 MG Oral Tab 1-2 po qid Disp: 180 tablet Rfl: 0   Carisoprodol 350 MG Oral Tab Take 1 tablet (350 mg total) by mouth 4 (four) times jasmeet Location: [address] [place] [address] OR    KNEE SCOPE,MED/LAT MENISECTOMY Right 10/29/2015    Comment Procedure: KNEE ARTHROSCOPY;  Surgeon: Kisha Aguilera MD;  Location: [facility]     Family History   Problem Relation Age of Onset   • Heart Disease Mother    • Heart         5   5      DTRs:   Biceps Triceps Brachio Patella Ankle   Left 2+ 2+ 2+ 2+ 2+   Right 2+ 2+ 2+ 2+ 2+            REVIEW OF STUDIES:        ASSESSMENT AND PLAN:      Imaging and anatomy were reviewed with the patient and explained in detail.  Dr kemp Ear Star Wedge Flap Text: The defect edges were debeveled with a #15 blade scalpel.  Given the location of the defect and the proximity to free margins (helical rim) an ear star wedge flap was deemed most appropriate.  Using a sterile surgical marker, the appropriate flap was drawn incorporating the defect and placing the expected incisions between the helical rim and antihelix where possible.  The area thus outlined was incised through and through with a #15 scalpel blade.

## 2019-08-28 NOTE — TELEPHONE ENCOUNTER
Last refilled on 7/31/19 for # 120 with 0 refills  Last OV 7/24/19  Future Appointments   Date Time Provider Shaggy Harmon   9/17/2019  3:20 PM DO KARAN Ryan        Thank you.

## 2019-08-29 RX ORDER — CARISOPRODOL 350 MG/1
TABLET ORAL
Qty: 120 TABLET | Refills: 0 | Status: SHIPPED
Start: 2019-08-29 | End: 2019-09-18

## 2019-09-15 DIAGNOSIS — G43.101 MIGRAINE WITH AURA AND WITH STATUS MIGRAINOSUS, NOT INTRACTABLE: ICD-10-CM

## 2019-09-16 NOTE — TELEPHONE ENCOUNTER
Refused. Patient has upcoming appt.      Medication: TOPIRAMATE 100 MG Oral Tab    Date of last refill: 07/22/19 (#360/0)  [place]    Last office visit: 3/19/2019  Due back to clinic per last office note:  Around 09/1

## 2019-09-17 RX ORDER — TOPIRAMATE 100 MG/1
TABLET, FILM COATED ORAL
Qty: 360 TABLET | Refills: 0 | OUTPATIENT
Start: 2019-09-17

## 2019-09-18 ENCOUNTER — OFFICE VISIT (OUTPATIENT)
Dept: NEUROLOGY | Facility: CLINIC | Age: 59
End: 2019-09-18
Payer: COMMERCIAL

## 2019-09-18 VITALS
BODY MASS INDEX: 30 KG/M2 | DIASTOLIC BLOOD PRESSURE: 70 MMHG | SYSTOLIC BLOOD PRESSURE: 122 MMHG | HEART RATE: 78 BPM | RESPIRATION RATE: 16 BRPM | WEIGHT: 228 LBS

## 2019-09-18 DIAGNOSIS — G43.009 MIGRAINE WITHOUT AURA AND WITHOUT STATUS MIGRAINOSUS, NOT INTRACTABLE: Primary | ICD-10-CM

## 2019-09-18 PROCEDURE — 99213 OFFICE O/P EST LOW 20 MIN: CPT | Performed by: OTHER

## 2019-09-18 NOTE — PROGRESS NOTES
The patient is here for a follow-up for migraines. The patient states an increase in migraines in the past month.

## 2019-09-18 NOTE — PROGRESS NOTES
Neurology H&P    Priya Cooper Patient Status:  No patient class for patient encounter    3/12/1960 MRN [id number]   Location ED [facility] Attending No att. providers found   Hosp Day #  PCP Swati Mendez DO     Subjective:  Initial Clinic HPI to his headaches. He is not sleeping well. He denies any new numbness, weakness or tingling.        Current Medications:    Current Outpatient Medications:  DULoxetine HCl 60 MG Oral Cap DR Particles TAKE 1 CAPSULE BY MOUTH TWO TIMES DAILY Disp: 180 capsule Diverticulosis of colon (without mention of hemorrhage)    • High cholesterol    • Hyperlipidemia    • Migraines    • Neuropathy     left side occurred after surgery   • Osteoarthritis     SPINE, KNEES   • OTHER DISEASES 1995    hepatitis drug induced   • new blurry or double vision  Head and Neck: no eye or ear discharge  Pulmonary: no SOB, no new cough  CV: no chest pain, no new lower extremity swelling  GI: no constipation or abdominal pain  : denies frequent urination or difficulty urinating   Heme: n a 65 y/o male with a PMH significant for low back pain and migraine type headache. His headaches are currently worse than before but he tells me that it is likely due to stress and poor sleep.  We did discuss starting a CGRP inhibitor but he declines any ch

## 2019-09-23 DIAGNOSIS — R19.8 UMBILICAL BLEEDING: ICD-10-CM

## 2019-09-23 DIAGNOSIS — Q64.4 URACHAL CYST: ICD-10-CM

## 2019-09-23 DIAGNOSIS — G43.009 MIGRAINE WITHOUT AURA AND WITHOUT STATUS MIGRAINOSUS, NOT INTRACTABLE: ICD-10-CM

## 2019-09-23 RX ORDER — HYDROCODONE BITARTRATE AND ACETAMINOPHEN 7.5; 325 MG/1; MG/1
TABLET ORAL
Qty: 24 TABLET | Refills: 0 | Status: SHIPPED | OUTPATIENT
Start: 2019-09-23 | End: 2021-01-20

## 2019-09-23 NOTE — TELEPHONE ENCOUNTER
LOV: 7/24/19   Last Refill: 5/24/19 #24 0 RF    Future Appointments   Date Time Provider Shaggy Harmon   9/30/2019  2:45 PM Sadaf Evans, [facility]

## 2019-09-24 ENCOUNTER — TELEPHONE (OUTPATIENT)
Dept: FAMILY MEDICINE CLINIC | Facility: CLINIC | Age: 59
End: 2019-09-24

## 2019-09-24 DIAGNOSIS — R19.8 UMBILICAL BLEEDING: ICD-10-CM

## 2019-09-24 DIAGNOSIS — G43.009 MIGRAINE WITHOUT AURA AND WITHOUT STATUS MIGRAINOSUS, NOT INTRACTABLE: ICD-10-CM

## 2019-09-24 DIAGNOSIS — Q64.4 URACHAL CYST: ICD-10-CM

## 2019-09-24 RX ORDER — HYDROCODONE BITARTRATE AND ACETAMINOPHEN 7.5; 325 MG/1; MG/1
TABLET ORAL
Qty: 180 TABLET | Refills: 0 | Status: SHIPPED | OUTPATIENT
Start: 2019-09-24 | End: 2019-10-22

## 2019-09-24 NOTE — TELEPHONE ENCOUNTER
Per conversation with DS- should have been for normal quantity      New quantity pended fo rprovider review- call to pharmacy to cancel wrong script- cancelled per [place] at pharmacy.     Pt advised script was cancelled- provider to send new script

## 2019-09-24 NOTE — TELEPHONE ENCOUNTER
PT CALLED AND ADV HE WENT TO GO P/U HYDROCODONE SCRIPT AND ADV THAT QUANTITY WAS ONLY 24.    PT WANTING TO KNOW IF THIS IS CORRECT OR IN ERROR.     PLEASE ADV    THANK YOU

## 2019-09-25 ENCOUNTER — OFFICE VISIT (OUTPATIENT)
Dept: CARDIOLOGY | Age: 59
End: 2019-09-25

## 2019-09-25 VITALS
DIASTOLIC BLOOD PRESSURE: 82 MMHG | HEIGHT: 73 IN | BODY MASS INDEX: 30.09 KG/M2 | WEIGHT: 227 LBS | SYSTOLIC BLOOD PRESSURE: 138 MMHG | HEART RATE: 74 BPM

## 2019-09-25 DIAGNOSIS — Z86.79 HISTORY OF ATRIAL FIBRILLATION: ICD-10-CM

## 2019-09-25 DIAGNOSIS — R07.89 ATYPICAL CHEST PAIN: Primary | ICD-10-CM

## 2019-09-25 PROCEDURE — 93000 ELECTROCARDIOGRAM COMPLETE: CPT | Performed by: INTERNAL MEDICINE

## 2019-09-25 PROCEDURE — 99214 OFFICE O/P EST MOD 30 MIN: CPT | Performed by: INTERNAL MEDICINE

## 2019-09-25 RX ORDER — ROSUVASTATIN CALCIUM 10 MG/1
TABLET, COATED ORAL
COMMUNITY
Start: 2019-08-06 | End: 2019-11-04

## 2019-09-25 RX ORDER — HYDROCODONE BITARTRATE AND ACETAMINOPHEN 7.5; 325 MG/1; MG/1
TABLET ORAL
COMMUNITY
Start: 2019-09-23

## 2019-09-25 RX ORDER — TOPIRAMATE 100 MG/1
200 TABLET, FILM COATED ORAL
COMMUNITY
Start: 2019-07-22

## 2019-09-25 ASSESSMENT — PATIENT HEALTH QUESTIONNAIRE - PHQ9
SUM OF ALL RESPONSES TO PHQ9 QUESTIONS 1 AND 2: 0
SUM OF ALL RESPONSES TO PHQ9 QUESTIONS 1 AND 2: 0
2. FEELING DOWN, DEPRESSED OR HOPELESS: NOT AT ALL
1. LITTLE INTEREST OR PLEASURE IN DOING THINGS: NOT AT ALL

## 2019-09-26 ENCOUNTER — MED REC SCAN ONLY (OUTPATIENT)
Dept: FAMILY MEDICINE CLINIC | Facility: CLINIC | Age: 59
End: 2019-09-26

## 2019-09-28 RX ORDER — CARISOPRODOL 350 MG/1
TABLET ORAL
Qty: 120 TABLET | Refills: 0 | Status: SHIPPED | OUTPATIENT
Start: 2019-09-28 | End: 2019-10-24

## 2019-09-28 NOTE — TELEPHONE ENCOUNTER
Last refill: 5- #120 with 0 refills  Last Visit: 7-  Next Visit:   Future Appointments   Date Time Provider Shaggy Harmon   9/30/2019  2:45 PM Sadaf Evans, [facility]         Forward to Dr. Chavo Griffiths please advise on refills.  Lavell Reyna

## 2019-09-30 ENCOUNTER — OFFICE VISIT (OUTPATIENT)
Dept: FAMILY MEDICINE CLINIC | Facility: CLINIC | Age: 59
End: 2019-09-30
Payer: COMMERCIAL

## 2019-09-30 ENCOUNTER — HOSPITAL ENCOUNTER (OUTPATIENT)
Dept: CT IMAGING | Age: 59
Discharge: HOME | End: 2019-09-30
Attending: FAMILY MEDICINE
Payer: COMMERCIAL

## 2019-09-30 VITALS
WEIGHT: 232 LBS | HEIGHT: 72 IN | SYSTOLIC BLOOD PRESSURE: 150 MMHG | BODY MASS INDEX: 31.42 KG/M2 | HEART RATE: 64 BPM | DIASTOLIC BLOOD PRESSURE: 90 MMHG | TEMPERATURE: 99 F | RESPIRATION RATE: 16 BRPM

## 2019-09-30 DIAGNOSIS — R63.0 ANOREXIA: ICD-10-CM

## 2019-09-30 DIAGNOSIS — R10.31 ABDOMINAL PAIN, RIGHT LOWER QUADRANT: ICD-10-CM

## 2019-09-30 DIAGNOSIS — Z00.00 ROUTINE HEALTH MAINTENANCE: Primary | ICD-10-CM

## 2019-09-30 PROCEDURE — 99214 OFFICE O/P EST MOD 30 MIN: CPT | Performed by: FAMILY MEDICINE

## 2019-09-30 PROCEDURE — 74177 CT ABD & PELVIS W/CONTRAST: CPT | Performed by: FAMILY MEDICINE

## 2019-09-30 PROCEDURE — 82565 ASSAY OF CREATININE: CPT

## 2019-09-30 PROCEDURE — 99396 PREV VISIT EST AGE 40-64: CPT | Performed by: FAMILY MEDICINE

## 2019-09-30 NOTE — PROGRESS NOTES
Jolene Neal is a 64year old male who presents for a complete physical exam.   HPI:   Pt complains of under a lot of stress his company is closing and he will be out of a job by [employer], not sleeping well.  His mother recently had to have emergency laparosc 120 tablet Rfl: 0   HYDROcodone-acetaminophen 7.5-325 MG Oral Tab 1-2 po qid prn pain Disp: 180 tablet Rfl: 0   HYDROcodone-acetaminophen 7.5-325 MG Oral Tab 1-2 po qid prn pain Disp: 24 tablet Rfl: 0   DULoxetine HCl 60 MG Oral Cap DR Particles TAKE 1 CAP CHOLECYSTECTOMY     • COLONOSCOPY, POSSIBLE BIOPSY, POSSIBLE POLYPECTOMY 06980 N/A 8/15/2018    Performed by Obi Coe DO at [address]     • HERNIA SURGERY     • KNEE ARTHROSCOPY Right 10/29/2015    Performed by Alayna Arguello headaches  PSYCHE: denies depression or anxiety  HEMATOLOGIC: denies hx of anemia  ENDOCRINE: denies thyroid history  ALL/ASTHMA: denies hx of allergy or asthma    EXAM:   /90   Pulse 64   Temp 98.9 °F (37.2 °C) (Temporal)   Resp 16   Ht 72\"   Wt 23 this encounter       Imaging & Consults:  CT ABDOMEN(CONTRAST ONLY) (CPT=74160)  Sent for stat CT of the ABd and pelvis with contrast to r/o appy

## 2019-10-01 ENCOUNTER — TELEPHONE (OUTPATIENT)
Dept: FAMILY MEDICINE CLINIC | Facility: CLINIC | Age: 59
End: 2019-10-01

## 2019-10-01 DIAGNOSIS — G43.101 MIGRAINE WITH AURA AND WITH STATUS MIGRAINOSUS, NOT INTRACTABLE: ICD-10-CM

## 2019-10-01 NOTE — TELEPHONE ENCOUNTER
----- Message from Ritchie Ceja DO sent at 9/30/2019  7:05 PM CDT -----  Notified of results the CT did not show a definite appendix, but it appears he has an ileus, he had a number of bwel movements today and has not had a fever he is to call if his Sx w

## 2019-10-02 RX ORDER — TOPIRAMATE 100 MG/1
TABLET, FILM COATED ORAL
Qty: 360 TABLET | Refills: 0 | Status: SHIPPED | OUTPATIENT
Start: 2019-10-02 | End: 2019-11-26

## 2019-10-02 NOTE — TELEPHONE ENCOUNTER
Medication: TOPIRAMATE 100 MG Oral Tab    Date of last refill: 07/22/19 (#360/0)  [place]    Last office visit: 9/18/2019  Due back to clinic per last office note:  Around 01/18/20  Date next office visit scheduled:

## 2019-10-04 ENCOUNTER — TELEPHONE (OUTPATIENT)
Dept: FAMILY MEDICINE CLINIC | Facility: CLINIC | Age: 59
End: 2019-10-04

## 2019-10-04 NOTE — TELEPHONE ENCOUNTER
Forward to Dr. Torito Kate, pt does not want to give more information. Would like to speak directly to you.

## 2019-10-04 NOTE — TELEPHONE ENCOUNTER
Pt want to speak with DS NOT A NURSE. Pt is aware DS is out of the office until Monday. This is about an on going issue.

## 2019-10-05 NOTE — TELEPHONE ENCOUNTER
Spoke with patient regarding his wifes mammogram with additional views and she is really freaked out that she should have a biopsy even though they told her 6 months follow up.  Reassured that if it were serious they would have sent her for a biopsy immedia

## 2019-10-22 DIAGNOSIS — G43.009 MIGRAINE WITHOUT AURA AND WITHOUT STATUS MIGRAINOSUS, NOT INTRACTABLE: ICD-10-CM

## 2019-10-22 DIAGNOSIS — Q64.4 URACHAL CYST: ICD-10-CM

## 2019-10-22 DIAGNOSIS — R19.8 UMBILICAL BLEEDING: ICD-10-CM

## 2019-10-22 RX ORDER — HYDROCODONE BITARTRATE AND ACETAMINOPHEN 7.5; 325 MG/1; MG/1
TABLET ORAL
Qty: 180 TABLET | Refills: 0 | Status: SHIPPED | OUTPATIENT
Start: 2019-10-22 | End: 2019-11-22

## 2019-10-22 NOTE — TELEPHONE ENCOUNTER
HYDROcodone-acetaminophen 7.5-325 MG Oral Tab    [id number] - [place], [address] [place] [address] ([address]), [phone], [phone]

## 2019-10-24 RX ORDER — CARISOPRODOL 350 MG/1
TABLET ORAL
Qty: 120 TABLET | Refills: 0 | Status: SHIPPED | OUTPATIENT
Start: 2019-10-24 | End: 2019-11-22

## 2019-10-30 ENCOUNTER — TELEPHONE (OUTPATIENT)
Dept: FAMILY MEDICINE CLINIC | Facility: CLINIC | Age: 59
End: 2019-10-30

## 2019-11-05 RX ORDER — DULOXETIN HYDROCHLORIDE 60 MG/1
CAPSULE, DELAYED RELEASE ORAL
Qty: 180 CAPSULE | Refills: 2 | Status: SHIPPED | OUTPATIENT
Start: 2019-11-05 | End: 2019-11-05

## 2019-11-05 RX ORDER — DULOXETIN HYDROCHLORIDE 60 MG/1
60 CAPSULE, DELAYED RELEASE ORAL DAILY
Qty: 60 CAPSULE | Refills: 0 | Status: SHIPPED | OUTPATIENT
Start: 2019-11-05 | End: 2019-11-05

## 2019-11-05 NOTE — TELEPHONE ENCOUNTER
DULoxetine HCl 60 MG Oral Cap DR Particles  Pt called want refill of this medication.     He wants one-30 day supply sent to [facility]    And one- 90 day supply sent to [facility] mail supply

## 2019-11-05 NOTE — TELEPHONE ENCOUNTER
LOV: 9/30/19   Last Refill: 8/17/19 to mail order    Pt is requesting a 30 day supply to local pharmacy

## 2019-11-06 ENCOUNTER — TELEPHONE (OUTPATIENT)
Dept: FAMILY MEDICINE CLINIC | Facility: CLINIC | Age: 59
End: 2019-11-06

## 2019-11-06 RX ORDER — DULOXETIN HYDROCHLORIDE 60 MG/1
CAPSULE, DELAYED RELEASE ORAL
Qty: 270 CAPSULE | Refills: 2 | Status: SHIPPED | OUTPATIENT
Start: 2019-11-06 | End: 2020-02-04

## 2019-11-06 RX ORDER — DULOXETIN HYDROCHLORIDE 60 MG/1
CAPSULE, DELAYED RELEASE ORAL
Qty: 90 CAPSULE | Refills: 1 | Status: SHIPPED | OUTPATIENT
Start: 2019-11-06 | End: 2019-11-06

## 2019-11-06 RX ORDER — DULOXETIN HYDROCHLORIDE 60 MG/1
CAPSULE, DELAYED RELEASE ORAL
Qty: 270 CAPSULE | Refills: 1 | OUTPATIENT
Start: 2019-11-06

## 2019-11-06 RX ORDER — DULOXETIN HYDROCHLORIDE 60 MG/1
CAPSULE, DELAYED RELEASE ORAL
Qty: 90 CAPSULE | Refills: 2 | Status: SHIPPED | OUTPATIENT
Start: 2019-11-06 | End: 2019-11-06

## 2019-11-06 NOTE — TELEPHONE ENCOUNTER
RN called pharmacy- they did confirm that a new script was sent.     Pt was advised- verbalized understanding

## 2019-11-06 NOTE — TELEPHONE ENCOUNTER
PT CALLED AND ADV THAT HE IS REQUESTING A REFILL BE SENT TO     [facility] IN [place]   (30DAY SUPPLY AND PT TAKES 3 A DAY)    THEN HE WANTS A 90DAY SUPPLY (HE TAKES 3 A DAY) SENT TO     [facility].       PLEASE CALL IF ANY PROBLEMS     THANK YOU

## 2019-11-06 NOTE — TELEPHONE ENCOUNTER
Pt states that at [facility] he told DS that he was taking 2 Duloxetine at night and 1 in the morning. RN did not see notes in Epic- pt states DS id aware he is taking 3 day.   Please advise    Okay to fill 30 day supply at local pharmacy and then 90 day supply a

## 2019-11-07 ENCOUNTER — TELEPHONE (OUTPATIENT)
Dept: FAMILY MEDICINE CLINIC | Facility: CLINIC | Age: 59
End: 2019-11-07

## 2019-11-07 NOTE — TELEPHONE ENCOUNTER
Per Pharmacy pt picked this up and paid out of pocket    Plan only cover 2 tab daily- PA is needed for 3rd tablet    Call to [employer] [phone]  Pt Id: [id number]    PA Started # [id number]    Will fax over decision when available

## 2019-11-12 NOTE — TELEPHONE ENCOUNTER
Denial received today and paperwork given to provider    Awaiting instructions or recommendations from provider

## 2019-11-13 ENCOUNTER — TELEPHONE (OUTPATIENT)
Dept: FAMILY MEDICINE CLINIC | Facility: CLINIC | Age: 59
End: 2019-11-13

## 2019-11-13 NOTE — TELEPHONE ENCOUNTER
Pt states he spoke with pharmacy benefit- [facility]    Was told to [facility] to send Duloxetine 3 tabs daily- and Roma will over ride. Please send all cymbalta scripts to [facility].

## 2019-11-13 NOTE — TELEPHONE ENCOUNTER
Appeal was denied- plan only covers 2 pills a day. Pt was advised and verbalized understanding    Pt states he will contact insurance to see if there is anything he can do.

## 2019-11-22 DIAGNOSIS — R19.8 UMBILICAL BLEEDING: ICD-10-CM

## 2019-11-22 DIAGNOSIS — Q64.4 URACHAL CYST: ICD-10-CM

## 2019-11-22 DIAGNOSIS — G43.009 MIGRAINE WITHOUT AURA AND WITHOUT STATUS MIGRAINOSUS, NOT INTRACTABLE: ICD-10-CM

## 2019-11-22 RX ORDER — CARISOPRODOL 350 MG/1
TABLET ORAL
Qty: 120 TABLET | Refills: 0 | Status: SHIPPED | OUTPATIENT
Start: 2019-11-22 | End: 2019-12-20

## 2019-11-22 RX ORDER — HYDROCODONE BITARTRATE AND ACETAMINOPHEN 7.5; 325 MG/1; MG/1
TABLET ORAL
Qty: 180 TABLET | Refills: 0 | Status: SHIPPED | OUTPATIENT
Start: 2019-11-22 | End: 2019-12-20

## 2019-11-22 NOTE — TELEPHONE ENCOUNTER
Last refills -   Hydrocodone - 10/22/19 - #180  Carisoprodol - 10/24/19 - #120  Last office visit - 9/30/19

## 2019-11-22 NOTE — TELEPHONE ENCOUNTER
Pt needs a refill of the  CARISOPRODOL 350 MG Oral Tab  And  HYDROcodone-acetaminophen 7.5-325 MG Oral Tab  [facility] in [place]

## 2019-11-26 DIAGNOSIS — G43.101 MIGRAINE WITH AURA AND WITH STATUS MIGRAINOSUS, NOT INTRACTABLE: ICD-10-CM

## 2019-11-27 RX ORDER — TOPIRAMATE 100 MG/1
TABLET, FILM COATED ORAL
Qty: 360 TABLET | Refills: 0 | Status: SHIPPED | OUTPATIENT
Start: 2019-11-27 | End: 2020-02-20

## 2019-11-27 NOTE — TELEPHONE ENCOUNTER
Medication: TOPIRAMATE 100 MG Oral Tab    Date of last refill: 10/02/19 (#360/0)  [place]    Last office visit: 9/18/2019  Due back to clinic per last office note:  Around 01/18/20  Date next office visit scheduled:

## 2019-11-29 ENCOUNTER — NURSE ONLY (OUTPATIENT)
Dept: FAMILY MEDICINE CLINIC | Facility: CLINIC | Age: 59
End: 2019-11-29
Payer: COMMERCIAL

## 2019-11-29 DIAGNOSIS — Z00.00 ROUTINE HEALTH MAINTENANCE: ICD-10-CM

## 2019-11-29 PROCEDURE — 84153 ASSAY OF PSA TOTAL: CPT | Performed by: FAMILY MEDICINE

## 2019-11-29 PROCEDURE — 80061 LIPID PANEL: CPT | Performed by: FAMILY MEDICINE

## 2019-11-29 NOTE — PROGRESS NOTES
Patient presented for lab work ordered by Dr. Chiquis Cho. One mint tubes drawn with a staight needle in left AC space X 1 attempt. Pt tolerated procedure well.

## 2019-11-30 ENCOUNTER — TELEPHONE (OUTPATIENT)
Dept: FAMILY MEDICINE CLINIC | Facility: CLINIC | Age: 59
End: 2019-11-30

## 2019-11-30 NOTE — TELEPHONE ENCOUNTER
----- Message from Ivette Bryant DO sent at 11/30/2019  8:39 AM CST -----  Can notify Katina Vancearmida his LIPIDS look great, and his PSA was normal, he's good for 6 months, lets keep everything the same

## 2019-12-20 DIAGNOSIS — Q64.4 URACHAL CYST: ICD-10-CM

## 2019-12-20 DIAGNOSIS — G43.009 MIGRAINE WITHOUT AURA AND WITHOUT STATUS MIGRAINOSUS, NOT INTRACTABLE: ICD-10-CM

## 2019-12-20 DIAGNOSIS — R19.8 UMBILICAL BLEEDING: ICD-10-CM

## 2019-12-20 RX ORDER — CARISOPRODOL 350 MG/1
TABLET ORAL
Qty: 120 TABLET | Refills: 0 | Status: SHIPPED | OUTPATIENT
Start: 2019-12-20 | End: 2020-01-21

## 2019-12-20 RX ORDER — HYDROCODONE BITARTRATE AND ACETAMINOPHEN 7.5; 325 MG/1; MG/1
TABLET ORAL
Qty: 180 TABLET | Refills: 0 | Status: SHIPPED | OUTPATIENT
Start: 2019-12-20 | End: 2020-01-21

## 2019-12-20 NOTE — TELEPHONE ENCOUNTER
Last refill -   Carisoprodol - 11/22/19 - #120  Norco - 11/22/19 - #180  Last office visit - 9/30/19

## 2019-12-20 NOTE — TELEPHONE ENCOUNTER
Pt needs a refill of the  Carisoprodol 350 MG Oral Tab  And  HYDROcodone-acetaminophen 7.5-325 MG Oral Tab  [facility] in [place]

## 2019-12-23 DIAGNOSIS — G43.101 MIGRAINE WITH AURA AND WITH STATUS MIGRAINOSUS, NOT INTRACTABLE: ICD-10-CM

## 2019-12-23 RX ORDER — SUMATRIPTAN 100 MG/1
TABLET, FILM COATED ORAL
Qty: 9 TABLET | Refills: 0 | Status: SHIPPED | OUTPATIENT
Start: 2019-12-23 | End: 2020-05-26

## 2019-12-23 NOTE — TELEPHONE ENCOUNTER
Medication: Sumatriptan    Date of last refill: 4/19/19 (#9/3)  [place]     Last office visit: 9/18/2019  Due back to clinic per last office note:  4 months  Date next office visit scheduled:  No future appointments.

## 2019-12-26 RX ORDER — ROSUVASTATIN CALCIUM 10 MG/1
TABLET, COATED ORAL
Qty: 90 TABLET | Refills: 0 | Status: SHIPPED | OUTPATIENT
Start: 2019-12-26 | End: 2020-03-20

## 2020-01-01 ENCOUNTER — EXTERNAL RECORD (OUTPATIENT)
Dept: HEALTH INFORMATION MANAGEMENT | Facility: OTHER | Age: 60
End: 2020-01-01

## 2020-01-21 DIAGNOSIS — R19.8 UMBILICAL BLEEDING: ICD-10-CM

## 2020-01-21 DIAGNOSIS — G43.009 MIGRAINE WITHOUT AURA AND WITHOUT STATUS MIGRAINOSUS, NOT INTRACTABLE: ICD-10-CM

## 2020-01-21 DIAGNOSIS — Q64.4 URACHAL CYST: ICD-10-CM

## 2020-01-21 RX ORDER — HYDROCODONE BITARTRATE AND ACETAMINOPHEN 7.5; 325 MG/1; MG/1
TABLET ORAL
Qty: 180 TABLET | Refills: 0 | Status: SHIPPED | OUTPATIENT
Start: 2020-01-21 | End: 2020-02-20

## 2020-01-21 RX ORDER — CARISOPRODOL 350 MG/1
TABLET ORAL
Qty: 120 TABLET | Refills: 0 | Status: SHIPPED | OUTPATIENT
Start: 2020-01-21 | End: 2020-02-26

## 2020-01-21 NOTE — TELEPHONE ENCOUNTER
PT CALLED AND ADV NEEDS REFILLS OF     HYDROcodone-acetaminophen 7.5-325 MG Oral Tab    Carisoprodol 350 MG Oral Tab    PLEASE SEND TO [facility]     THANK YOU

## 2020-02-04 RX ORDER — DULOXETIN HYDROCHLORIDE 60 MG/1
CAPSULE, DELAYED RELEASE ORAL
Qty: 90 CAPSULE | Refills: 2 | Status: SHIPPED | OUTPATIENT
Start: 2020-02-04 | End: 2020-05-04 | Stop reason: DRUGHIGH

## 2020-02-04 NOTE — TELEPHONE ENCOUNTER
No refill protocol for this medication. Last refill: 11- #270 with 2 refills  Last Visit: 9-  Next Visit: No future appointments. Forward to Dr. Kristina Pereira please advise on refills. Thanks.

## 2020-02-19 DIAGNOSIS — G43.101 MIGRAINE WITH AURA AND WITH STATUS MIGRAINOSUS, NOT INTRACTABLE: ICD-10-CM

## 2020-02-20 DIAGNOSIS — R19.8 UMBILICAL BLEEDING: ICD-10-CM

## 2020-02-20 DIAGNOSIS — G43.009 MIGRAINE WITHOUT AURA AND WITHOUT STATUS MIGRAINOSUS, NOT INTRACTABLE: ICD-10-CM

## 2020-02-20 DIAGNOSIS — Q64.4 URACHAL CYST: ICD-10-CM

## 2020-02-20 RX ORDER — HYDROCODONE BITARTRATE AND ACETAMINOPHEN 7.5; 325 MG/1; MG/1
TABLET ORAL
Qty: 180 TABLET | Refills: 0 | Status: SHIPPED | OUTPATIENT
Start: 2020-02-20 | End: 2020-03-20

## 2020-02-20 RX ORDER — TOPIRAMATE 100 MG/1
TABLET, FILM COATED ORAL
Qty: 360 TABLET | Refills: 0 | Status: SHIPPED | OUTPATIENT
Start: 2020-02-20 | End: 2020-05-18

## 2020-02-20 RX ORDER — BUTALBITAL, ASPIRIN, AND CAFFEINE 50; 325; 40 MG/1; MG/1; MG/1
CAPSULE ORAL
Qty: 20 CAPSULE | Refills: 0 | Status: SHIPPED | OUTPATIENT
Start: 2020-02-20 | End: 2021-01-20

## 2020-02-20 NOTE — TELEPHONE ENCOUNTER
LOV: 9/30/19  Last Refill:   Norco 01/21/20  #180 0 RF  Butablbital 07/11/18 #20 0 RF    No future appointments.

## 2020-02-20 NOTE — TELEPHONE ENCOUNTER
[facility] [place] Please call back once meds have been called in.

## 2020-02-20 NOTE — TELEPHONE ENCOUNTER
Sent the patient a [employer] message to make an appt     Medication: TOPIRAMATE 100 MG Oral Tab    Date of last refill: 11/27/19 (#360/0)  [place]    Last office visit: 09/18/19  Due back to clinic per last office note

## 2020-02-26 ENCOUNTER — TELEPHONE (OUTPATIENT)
Dept: FAMILY MEDICINE CLINIC | Facility: CLINIC | Age: 60
End: 2020-02-26

## 2020-02-26 RX ORDER — CARISOPRODOL 350 MG/1
TABLET ORAL
Qty: 120 TABLET | Refills: 0 | Status: SHIPPED | OUTPATIENT
Start: 2020-02-26 | End: 2020-03-20

## 2020-02-26 NOTE — TELEPHONE ENCOUNTER
Pt is asking that we send Alanna to [address] [place] [address] ([address]), [phone], [phone]

## 2020-03-11 ENCOUNTER — OFFICE VISIT (OUTPATIENT)
Dept: CARDIOLOGY | Age: 60
End: 2020-03-11

## 2020-03-11 VITALS
BODY MASS INDEX: 29.03 KG/M2 | HEART RATE: 60 BPM | SYSTOLIC BLOOD PRESSURE: 122 MMHG | WEIGHT: 219 LBS | DIASTOLIC BLOOD PRESSURE: 62 MMHG | HEIGHT: 73 IN

## 2020-03-11 DIAGNOSIS — Z86.79 HISTORY OF ATRIAL FIBRILLATION: Primary | ICD-10-CM

## 2020-03-11 PROCEDURE — 99212 OFFICE O/P EST SF 10 MIN: CPT | Performed by: INTERNAL MEDICINE

## 2020-03-11 RX ORDER — VERAPAMIL HYDROCHLORIDE 80 MG/1
TABLET ORAL
COMMUNITY
Start: 2020-02-13 | End: 2021-03-17

## 2020-03-11 RX ORDER — BUTALBITAL, ASPIRIN, AND CAFFEINE 325; 50; 40 MG/1; MG/1; MG/1
CAPSULE ORAL
Refills: 0 | COMMUNITY
Start: 2020-02-20

## 2020-03-11 RX ORDER — DULOXETIN HYDROCHLORIDE 30 MG/1
CAPSULE, DELAYED RELEASE ORAL
Refills: 2 | COMMUNITY
Start: 2020-03-02 | End: 2021-03-17

## 2020-03-11 RX ORDER — ROSUVASTATIN CALCIUM 10 MG/1
TABLET, COATED ORAL
COMMUNITY
Start: 2020-01-24

## 2020-03-19 DIAGNOSIS — G43.009 MIGRAINE WITHOUT AURA AND WITHOUT STATUS MIGRAINOSUS, NOT INTRACTABLE: ICD-10-CM

## 2020-03-19 DIAGNOSIS — Q64.4 URACHAL CYST: ICD-10-CM

## 2020-03-19 DIAGNOSIS — R19.8 UMBILICAL BLEEDING: ICD-10-CM

## 2020-03-19 NOTE — TELEPHONE ENCOUNTER
PT CALLED AND ADV NEEDS REFILLS OF     Carisoprodol 350 MG Oral Tab    AND     HYDROcodone-acetaminophen 7.5-325 MG Oral Tab    PLEASE SEND TO [facility]    THANK YOU

## 2020-03-19 NOTE — TELEPHONE ENCOUNTER
LOV: 9/30/19  Last Refill:  Carisporodol 2/26/20 #120  norco 2/20/20 #180 0 RF    No future appointments.

## 2020-03-20 RX ORDER — ROSUVASTATIN CALCIUM 10 MG/1
TABLET, COATED ORAL
Qty: 90 TABLET | Refills: 0 | Status: SHIPPED | OUTPATIENT
Start: 2020-03-20 | End: 2020-06-13

## 2020-03-20 RX ORDER — HYDROCODONE BITARTRATE AND ACETAMINOPHEN 7.5; 325 MG/1; MG/1
TABLET ORAL
Qty: 180 TABLET | Refills: 0 | Status: SHIPPED | OUTPATIENT
Start: 2020-03-20 | End: 2020-04-20

## 2020-03-20 RX ORDER — CARISOPRODOL 350 MG/1
TABLET ORAL
Qty: 120 TABLET | Refills: 0 | Status: SHIPPED | OUTPATIENT
Start: 2020-03-20 | End: 2020-04-20

## 2020-03-20 NOTE — TELEPHONE ENCOUNTER
Cholesterol Medication Protocol Passed3/19 8:32 PM   ALT < 80    ALT resulted within past year    Lipid panel within past 12 months    Appointment within past 12 or next 3 months     Last refill - 12/26/19 - #90   Lat ALT - 5/20/19 - 42  Last lipid panel -

## 2020-04-08 DIAGNOSIS — G43.101 MIGRAINE WITH AURA AND WITH STATUS MIGRAINOSUS, NOT INTRACTABLE: ICD-10-CM

## 2020-04-09 RX ORDER — VERAPAMIL HYDROCHLORIDE 80 MG/1
TABLET ORAL
Qty: 270 TABLET | Refills: 5 | Status: SHIPPED | OUTPATIENT
Start: 2020-04-09 | End: 2021-01-20

## 2020-04-09 NOTE — TELEPHONE ENCOUNTER
RX only good for 1 year.      Medication: VERAPAMIL HCL 80 MG     Date of last refill: 3/19/19 (#270/5)  [place]    Last office visit: 9/18/19  Due back to clinic per last office note:  4 months  Date next office visit

## 2020-04-20 DIAGNOSIS — Q64.4 URACHAL CYST: ICD-10-CM

## 2020-04-20 DIAGNOSIS — R19.8 UMBILICAL BLEEDING: ICD-10-CM

## 2020-04-20 DIAGNOSIS — G43.009 MIGRAINE WITHOUT AURA AND WITHOUT STATUS MIGRAINOSUS, NOT INTRACTABLE: ICD-10-CM

## 2020-04-20 RX ORDER — CARISOPRODOL 350 MG/1
TABLET ORAL
Qty: 120 TABLET | Refills: 0 | Status: SHIPPED | OUTPATIENT
Start: 2020-04-20 | End: 2020-05-20

## 2020-04-20 RX ORDER — HYDROCODONE BITARTRATE AND ACETAMINOPHEN 7.5; 325 MG/1; MG/1
TABLET ORAL
Qty: 180 TABLET | Refills: 0 | Status: SHIPPED | OUTPATIENT
Start: 2020-04-20 | End: 2020-05-20

## 2020-04-20 NOTE — TELEPHONE ENCOUNTER
Last OV 9/30/19  Last refilled 3/20/2020  Hydrocodone #180  0 refills  Carisoprodol  #120  0 refills

## 2020-04-20 NOTE — TELEPHONE ENCOUNTER
[id number] - [place], [address] AT [facility] ([address]), [phone], [phone]    HYDROcodone-acetaminophen 7.5-325 MG Oral Tab    Carisoprodol 350 MG Oral Tab

## 2020-04-24 ENCOUNTER — OFFICE VISIT (OUTPATIENT)
Dept: FAMILY MEDICINE CLINIC | Facility: CLINIC | Age: 60
End: 2020-04-24
Payer: COMMERCIAL

## 2020-04-24 ENCOUNTER — TELEPHONE (OUTPATIENT)
Dept: FAMILY MEDICINE CLINIC | Facility: CLINIC | Age: 60
End: 2020-04-24

## 2020-04-24 VITALS — TEMPERATURE: 98 F

## 2020-04-24 DIAGNOSIS — S65.510A LACERATION OF BLOOD VESSEL OF RIGHT INDEX FINGER, INITIAL ENCOUNTER: Primary | ICD-10-CM

## 2020-04-24 DIAGNOSIS — S61.210A LACERATION OF RIGHT INDEX FINGER WITHOUT FOREIGN BODY WITHOUT DAMAGE TO NAIL, INITIAL ENCOUNTER: ICD-10-CM

## 2020-04-24 PROCEDURE — 99213 OFFICE O/P EST LOW 20 MIN: CPT | Performed by: FAMILY MEDICINE

## 2020-04-24 PROCEDURE — 17250 CHEM CAUT OF GRANLTJ TISSUE: CPT | Performed by: FAMILY MEDICINE

## 2020-04-24 NOTE — TELEPHONE ENCOUNTER
Per DS, patient is taking Duloxetine 60 mg, 2 tabs AM and 1 tab PM.    What provider recommended the dose increase from one tab BID    Left message on voicemail/answering machine for patient to call office

## 2020-04-24 NOTE — PROGRESS NOTES
Silvio Sebastian is a 64year old male.   HPI:   Michelle Suhas was using a  when he accidentally cut off the laterarl aspect of his right index finger, he was able to find the piece which was rather long but was just below the epidermis and into the underly violent according to patient   • Attention deficit hyperactivity disorder (ADHD)     NO MEDS   • Back problem    • Depression     AFTER THE BACK SURGERY WITH POOR RESULTS   • Diverticulosis of colon (without mention of hemorrhage)    • High cholesterol Visit:  Requested Prescriptions      No prescriptions requested or ordered in this encounter   APPLIED PRESSURE TO THE SITE AND THEN USED ELECTROCAUTERY TO CAUTERIZE THE MOST PROMINENT BLEEDING SITES.  THE FINGERR WAS THEN WRAPPED IN NONADHERENT DRESSING GA

## 2020-05-02 RX ORDER — DULOXETIN HYDROCHLORIDE 60 MG/1
CAPSULE, DELAYED RELEASE ORAL
Qty: 60 CAPSULE | Refills: 0 | Status: CANCELLED | OUTPATIENT
Start: 2020-05-02

## 2020-05-04 ENCOUNTER — OFFICE VISIT (OUTPATIENT)
Dept: FAMILY MEDICINE CLINIC | Facility: CLINIC | Age: 60
End: 2020-05-04
Payer: COMMERCIAL

## 2020-05-04 DIAGNOSIS — S61.210A LACERATION OF INDEX FINGER OF RIGHT HAND WITHOUT COMPLICATION: Primary | ICD-10-CM

## 2020-05-04 PROCEDURE — 99213 OFFICE O/P EST LOW 20 MIN: CPT | Performed by: FAMILY MEDICINE

## 2020-05-04 RX ORDER — DULOXETIN HYDROCHLORIDE 30 MG/1
CAPSULE, DELAYED RELEASE ORAL
Qty: 60 CAPSULE | Refills: 0 | OUTPATIENT
Start: 2020-05-04

## 2020-05-04 NOTE — PROGRESS NOTES
Anastacio Royal is a 64year old male.   HPI:   Kaylee Cullen was using a  when he accidentally cut off the laterarl aspect of his right index finger, he was able to find the piece which was rather long but was just below the epidermis and into the underly combative, violent according to patient   • Attention deficit hyperactivity disorder (ADHD)     NO MEDS   • Back problem    • Depression     AFTER THE BACK SURGERY WITH POOR RESULTS   • Diverticulosis of colon (without mention of hemorrhage)    • High chol return in 10 days  For recheck.

## 2020-05-15 DIAGNOSIS — G43.101 MIGRAINE WITH AURA AND WITH STATUS MIGRAINOSUS, NOT INTRACTABLE: ICD-10-CM

## 2020-05-18 RX ORDER — TOPIRAMATE 100 MG/1
TABLET, FILM COATED ORAL
Qty: 360 TABLET | Refills: 0 | Status: SHIPPED | OUTPATIENT
Start: 2020-05-18 | End: 2020-08-10

## 2020-05-18 NOTE — TELEPHONE ENCOUNTER
Pt due for f/u appt. [facility].     Medication: TOPIRAMATE 100 MG     Date of last refill: 2/20/20 (#360/0)  [place]    Last office visit: 9/18/19  Due back to clinic per last office note:  4 months  Date ne

## 2020-05-20 DIAGNOSIS — R19.8 UMBILICAL BLEEDING: ICD-10-CM

## 2020-05-20 DIAGNOSIS — G43.009 MIGRAINE WITHOUT AURA AND WITHOUT STATUS MIGRAINOSUS, NOT INTRACTABLE: ICD-10-CM

## 2020-05-20 DIAGNOSIS — Q64.4 URACHAL CYST: ICD-10-CM

## 2020-05-20 RX ORDER — HYDROCODONE BITARTRATE AND ACETAMINOPHEN 7.5; 325 MG/1; MG/1
TABLET ORAL
Qty: 180 TABLET | Refills: 0 | Status: SHIPPED | OUTPATIENT
Start: 2020-05-20 | End: 2020-06-21

## 2020-05-20 RX ORDER — CARISOPRODOL 350 MG/1
TABLET ORAL
Qty: 120 TABLET | Refills: 0 | Status: SHIPPED | OUTPATIENT
Start: 2020-05-20 | End: 2020-06-21

## 2020-05-20 NOTE — TELEPHONE ENCOUNTER
LOV:   Last Refill:  Hydrocodone 4/20/20 #180 0 RF    Carisoprodol 4/20/20 #120 0 RF    No future appointments.

## 2020-05-22 ENCOUNTER — TELEPHONE (OUTPATIENT)
Dept: CARDIOLOGY | Age: 60
End: 2020-05-22

## 2020-05-22 ENCOUNTER — E-ADVICE (OUTPATIENT)
Dept: CARDIOLOGY | Age: 60
End: 2020-05-22

## 2020-05-22 DIAGNOSIS — Z86.79 HISTORY OF ATRIAL FIBRILLATION: Primary | ICD-10-CM

## 2020-05-22 RX ORDER — METOPROLOL SUCCINATE 25 MG/1
25 TABLET, EXTENDED RELEASE ORAL DAILY
Qty: 90 TABLET | Refills: 0 | Status: SHIPPED | OUTPATIENT
Start: 2020-05-22 | End: 2020-08-20

## 2020-05-23 DIAGNOSIS — G43.101 MIGRAINE WITH AURA AND WITH STATUS MIGRAINOSUS, NOT INTRACTABLE: ICD-10-CM

## 2020-05-26 RX ORDER — SUMATRIPTAN 100 MG/1
TABLET, FILM COATED ORAL
Qty: 9 TABLET | Refills: 0 | Status: SHIPPED | OUTPATIENT
Start: 2020-05-26 | End: 2020-11-06

## 2020-05-26 NOTE — TELEPHONE ENCOUNTER
JESSICA on  (ok per HIPAA consent) to schedule a f/u visit with Dr Teodoro Hart so that rx can be processed.       Medication: SUMATRIPTAN SUCCINATE 100 MG    Date of last refill: 12/3/19 (#9/0)  [place]      Last office visi

## 2020-06-02 ENCOUNTER — VIRTUAL PHONE E/M (OUTPATIENT)
Dept: NEUROLOGY | Facility: CLINIC | Age: 60
End: 2020-06-02
Payer: COMMERCIAL

## 2020-06-02 DIAGNOSIS — G43.519 INTRACTABLE PERSISTENT MIGRAINE AURA WITHOUT CEREBRAL INFARCTION AND WITHOUT STATUS MIGRAINOSUS: Primary | ICD-10-CM

## 2020-06-02 PROCEDURE — 99213 OFFICE O/P EST LOW 20 MIN: CPT | Performed by: OTHER

## 2020-06-02 NOTE — PROGRESS NOTES
Please note that the following visit was completed using two-way, real-time interactive audio and/or video communication.   This has been done in good samia to provide continuity of care in the best interest of the provider-patient relationship, due to the for this visit:    Intractable persistent migraine aura without cerebral infarction and without status migrainosus          Tania Gilbert, DO  Neurology

## 2020-06-13 RX ORDER — ROSUVASTATIN CALCIUM 10 MG/1
TABLET, COATED ORAL
Qty: 90 TABLET | Refills: 2 | Status: SHIPPED | OUTPATIENT
Start: 2020-06-13 | End: 2020-12-21

## 2020-06-19 DIAGNOSIS — G43.009 MIGRAINE WITHOUT AURA AND WITHOUT STATUS MIGRAINOSUS, NOT INTRACTABLE: ICD-10-CM

## 2020-06-19 DIAGNOSIS — R19.8 UMBILICAL BLEEDING: ICD-10-CM

## 2020-06-19 DIAGNOSIS — Q64.4 URACHAL CYST: ICD-10-CM

## 2020-06-19 NOTE — TELEPHONE ENCOUNTER
Pt needs a refill of the  Carisoprodol 350 MG Oral Tab  And  HYDROcodone-acetaminophen 7.5-325 MG Oral Tab    [facility] [place]

## 2020-06-21 RX ORDER — CARISOPRODOL 350 MG/1
TABLET ORAL
Qty: 120 TABLET | Refills: 0 | Status: SHIPPED | OUTPATIENT
Start: 2020-06-21 | End: 2020-07-20

## 2020-06-21 RX ORDER — HYDROCODONE BITARTRATE AND ACETAMINOPHEN 7.5; 325 MG/1; MG/1
TABLET ORAL
Qty: 180 TABLET | Refills: 0 | Status: SHIPPED | OUTPATIENT
Start: 2020-06-21 | End: 2020-07-20

## 2020-07-20 DIAGNOSIS — Q64.4 URACHAL CYST: ICD-10-CM

## 2020-07-20 DIAGNOSIS — R19.8 UMBILICAL BLEEDING: ICD-10-CM

## 2020-07-20 DIAGNOSIS — G43.009 MIGRAINE WITHOUT AURA AND WITHOUT STATUS MIGRAINOSUS, NOT INTRACTABLE: ICD-10-CM

## 2020-07-20 RX ORDER — HYDROCODONE BITARTRATE AND ACETAMINOPHEN 7.5; 325 MG/1; MG/1
TABLET ORAL
Qty: 180 TABLET | Refills: 0 | Status: SHIPPED | OUTPATIENT
Start: 2020-07-20 | End: 2020-08-19

## 2020-07-20 RX ORDER — CARISOPRODOL 350 MG/1
TABLET ORAL
Qty: 120 TABLET | Refills: 0 | Status: SHIPPED | OUTPATIENT
Start: 2020-07-20 | End: 2020-08-19

## 2020-07-20 NOTE — TELEPHONE ENCOUNTER
LOV: 5/4/20   Last Refill: 6/21/20   Carisoprodol 6/21/20 #120 0 RF   Hydrocodone 6/21/20 #180 0 RF    No future appointments.

## 2020-07-20 NOTE — TELEPHONE ENCOUNTER
Carisoprodol 350 MG Oral Tab     HYDROcodone-acetaminophen 7.5-325 MG Oral Tab     [facility] #[id number] [place], [place] - [address] [place], [place] [address] -  [address] [place] [address] ([address]), [phone], [phone]

## 2020-07-29 NOTE — TELEPHONE ENCOUNTER
Pt needs a refill of the  HYDROcodone-acetaminophen 7.5-325 MG Oral Tab  And  Carisoprodol 350 MG Oral Tab  [facility] in [place] No

## 2020-08-02 NOTE — TELEPHONE ENCOUNTER
Both meds last refilled on 3/13/17 for # 90-day supplies with 3 rf. Last labs 6/24/17 and 4/29/17. Last seen on 12/11/17. No future appointments. Thank you. Implemented All Universal Safety Interventions:  [place] to call system. Call bell, personal items and telephone within reach. Instruct patient to call for assistance. Room bathroom lighting operational. Non-slip footwear when patient is off stretcher. Physically safe environment: no spills, clutter or unnecessary equipment. Stretcher in lowest position, wheels locked, appropriate side rails in place.

## 2020-08-08 DIAGNOSIS — G43.101 MIGRAINE WITH AURA AND WITH STATUS MIGRAINOSUS, NOT INTRACTABLE: ICD-10-CM

## 2020-08-10 RX ORDER — TOPIRAMATE 100 MG/1
TABLET, FILM COATED ORAL
Qty: 360 TABLET | Refills: 0 | Status: SHIPPED | OUTPATIENT
Start: 2020-08-10 | End: 2020-10-29

## 2020-08-10 NOTE — TELEPHONE ENCOUNTER
Sent the patient a [employer] message to make an appt for further medication refills.      Medication: TOPIRAMATE 100 MG Oral Tab    Date of last refill: 05/18/2020 (#360/0)  [place]    Last office visit: 06/02/2020  Due

## 2020-08-19 ENCOUNTER — TELEPHONE (OUTPATIENT)
Dept: CARDIOLOGY | Age: 60
End: 2020-08-19

## 2020-08-19 DIAGNOSIS — R19.8 UMBILICAL BLEEDING: ICD-10-CM

## 2020-08-19 DIAGNOSIS — Q64.4 URACHAL CYST: ICD-10-CM

## 2020-08-19 DIAGNOSIS — Z86.79 HISTORY OF ATRIAL FIBRILLATION: Primary | ICD-10-CM

## 2020-08-19 DIAGNOSIS — G43.009 MIGRAINE WITHOUT AURA AND WITHOUT STATUS MIGRAINOSUS, NOT INTRACTABLE: ICD-10-CM

## 2020-08-19 RX ORDER — CARISOPRODOL 350 MG/1
TABLET ORAL
Qty: 120 TABLET | Refills: 0 | Status: SHIPPED | OUTPATIENT
Start: 2020-08-19 | End: 2020-09-17

## 2020-08-19 RX ORDER — HYDROCODONE BITARTRATE AND ACETAMINOPHEN 7.5; 325 MG/1; MG/1
TABLET ORAL
Qty: 180 TABLET | Refills: 0 | Status: SHIPPED | OUTPATIENT
Start: 2020-08-19 | End: 2020-09-18

## 2020-08-19 NOTE — TELEPHONE ENCOUNTER
HYDROcodone-acetaminophen 7.5-325 MG Oral Tab [[id number]]    Carisoprodol 350 MG Oral Tab [[id number]]   [facility] [place]

## 2020-08-19 NOTE — TELEPHONE ENCOUNTER
LOV: 05/4/20   Last Refill: 7/20/20   Carisopradol  #120 0 RF  Norco  #180 0 RF    No future appointments.

## 2020-08-20 RX ORDER — METOPROLOL SUCCINATE 25 MG/1
25 TABLET, EXTENDED RELEASE ORAL DAILY
Qty: 30 TABLET | Refills: 0 | Status: SHIPPED | OUTPATIENT
Start: 2020-08-20 | End: 2020-08-21

## 2020-08-21 RX ORDER — METOPROLOL SUCCINATE 25 MG/1
25 TABLET, EXTENDED RELEASE ORAL DAILY
Qty: 90 TABLET | Refills: 0 | Status: SHIPPED | OUTPATIENT
Start: 2020-08-21 | End: 2020-09-21

## 2020-08-27 ENCOUNTER — TELEPHONE (OUTPATIENT)
Dept: FAMILY MEDICINE CLINIC | Facility: CLINIC | Age: 60
End: 2020-08-27

## 2020-09-17 ENCOUNTER — TELEPHONE (OUTPATIENT)
Dept: CARDIOLOGY | Age: 60
End: 2020-09-17

## 2020-09-17 DIAGNOSIS — G43.009 MIGRAINE WITHOUT AURA AND WITHOUT STATUS MIGRAINOSUS, NOT INTRACTABLE: ICD-10-CM

## 2020-09-17 DIAGNOSIS — Q64.4 URACHAL CYST: ICD-10-CM

## 2020-09-17 DIAGNOSIS — R19.8 UMBILICAL BLEEDING: ICD-10-CM

## 2020-09-17 RX ORDER — CARISOPRODOL 350 MG/1
TABLET ORAL
Qty: 120 TABLET | Refills: 1 | Status: SHIPPED | OUTPATIENT
Start: 2020-09-17 | End: 2020-10-19

## 2020-09-17 NOTE — TELEPHONE ENCOUNTER
Carisoprodol 350 MG Oral Tab     HYDROcodone-acetaminophen 7.5-325 MG Oral Tab     [facility] [place]

## 2020-09-18 ENCOUNTER — TELEPHONE (OUTPATIENT)
Dept: FAMILY MEDICINE CLINIC | Facility: CLINIC | Age: 60
End: 2020-09-18

## 2020-09-18 DIAGNOSIS — R19.8 UMBILICAL BLEEDING: ICD-10-CM

## 2020-09-18 DIAGNOSIS — Q64.4 URACHAL CYST: ICD-10-CM

## 2020-09-18 DIAGNOSIS — G43.009 MIGRAINE WITHOUT AURA AND WITHOUT STATUS MIGRAINOSUS, NOT INTRACTABLE: ICD-10-CM

## 2020-09-18 RX ORDER — HYDROCODONE BITARTRATE AND ACETAMINOPHEN 7.5; 325 MG/1; MG/1
TABLET ORAL
Qty: 20 TABLET | Refills: 0 | Status: SHIPPED | OUTPATIENT
Start: 2020-09-18 | End: 2020-11-21

## 2020-09-18 NOTE — TELEPHONE ENCOUNTER
Please let patient or caregiver know or leave message that I sent over #20 of the [place] to the [facility] in [place]. I can not send more as this is a controlled substance and I am not his primary doctor.   Thanks

## 2020-09-18 NOTE — TELEPHONE ENCOUNTER
HYDROcodone-acetaminophen 7.5-325 MG Oral Tab   This was requested yesterday but not sent  The other script was

## 2020-09-21 RX ORDER — HYDROCODONE BITARTRATE AND ACETAMINOPHEN 7.5; 325 MG/1; MG/1
TABLET ORAL
Qty: 180 TABLET | Refills: 0 | Status: SHIPPED | OUTPATIENT
Start: 2020-09-21 | End: 2020-10-19

## 2020-09-21 RX ORDER — METOPROLOL SUCCINATE 25 MG/1
25 TABLET, EXTENDED RELEASE ORAL DAILY
Qty: 30 TABLET | Refills: 0 | Status: SHIPPED | OUTPATIENT
Start: 2020-09-21 | End: 2020-10-23

## 2020-09-21 NOTE — TELEPHONE ENCOUNTER
Pt called wanting to know if the script for HYDROcodone-acetaminophen 7.5-325 MG Oral Tab was sent.  He states that [facility] sent 20 for him over the weekend but could send anymore as he is not the primary

## 2020-09-21 NOTE — TELEPHONE ENCOUNTER
Routing to provider    [facility] filled for #20 tabs- pt is looking for full script to be signed

## 2020-09-22 RX ORDER — DULOXETIN HYDROCHLORIDE 60 MG/1
CAPSULE, DELAYED RELEASE ORAL
Qty: 180 CAPSULE | Refills: 3 | Status: SHIPPED | OUTPATIENT
Start: 2020-09-22 | End: 2021-04-12

## 2020-10-17 DIAGNOSIS — Q64.4 URACHAL CYST: ICD-10-CM

## 2020-10-17 DIAGNOSIS — G43.009 MIGRAINE WITHOUT AURA AND WITHOUT STATUS MIGRAINOSUS, NOT INTRACTABLE: ICD-10-CM

## 2020-10-17 DIAGNOSIS — R19.8 UMBILICAL BLEEDING: ICD-10-CM

## 2020-10-17 NOTE — TELEPHONE ENCOUNTER
LOV: 5/4/20  Last Refill:  Melia Padilla 9/18/20 #20  Carisopordol 9/17/20 #120     No future appointments.

## 2020-10-17 NOTE — TELEPHONE ENCOUNTER
HYDROcodone-acetaminophen 7.5-325 MG Oral Tab    &    Carisoprodol 350 MG Oral Tab    Pt would like refill sent to   [place] [address] #[id number] - [place], [address] [place] [address] ([address]), [phone], [phone]

## 2020-10-19 RX ORDER — HYDROCODONE BITARTRATE AND ACETAMINOPHEN 7.5; 325 MG/1; MG/1
TABLET ORAL
Qty: 180 TABLET | Refills: 0 | Status: SHIPPED | OUTPATIENT
Start: 2020-10-19 | End: 2021-01-20

## 2020-10-19 RX ORDER — CARISOPRODOL 350 MG/1
TABLET ORAL
Qty: 120 TABLET | Refills: 1 | Status: SHIPPED | OUTPATIENT
Start: 2020-10-19 | End: 2020-11-21

## 2020-10-20 ENCOUNTER — TELEPHONE (OUTPATIENT)
Dept: CARDIOLOGY | Age: 60
End: 2020-10-20

## 2020-10-20 ENCOUNTER — TELEPHONE (OUTPATIENT)
Dept: FAMILY MEDICINE CLINIC | Facility: CLINIC | Age: 60
End: 2020-10-20

## 2020-10-20 DIAGNOSIS — Z86.79 HISTORY OF ATRIAL FIBRILLATION: Primary | ICD-10-CM

## 2020-10-23 RX ORDER — METOPROLOL SUCCINATE 25 MG/1
25 TABLET, EXTENDED RELEASE ORAL DAILY
Qty: 30 TABLET | Refills: 0 | Status: SHIPPED | OUTPATIENT
Start: 2020-10-23 | End: 2020-11-20

## 2020-10-28 DIAGNOSIS — G43.101 MIGRAINE WITH AURA AND WITH STATUS MIGRAINOSUS, NOT INTRACTABLE: ICD-10-CM

## 2020-10-29 RX ORDER — TOPIRAMATE 100 MG/1
TABLET, FILM COATED ORAL
Qty: 360 TABLET | Refills: 0 | Status: SHIPPED | OUTPATIENT
Start: 2020-10-29 | End: 2021-01-19

## 2020-10-29 NOTE — TELEPHONE ENCOUNTER
LMTCB to schedule an appt for further medication refills. Left office hours and phone number.        Medication: TOPIRAMATE 100 MG Oral Tab    Date of last refill: 08/10/2020 (#360/0)  [place]    Last office visit: 06

## 2020-11-05 DIAGNOSIS — G43.101 MIGRAINE WITH AURA AND WITH STATUS MIGRAINOSUS, NOT INTRACTABLE: ICD-10-CM

## 2020-11-05 NOTE — TELEPHONE ENCOUNTER
Patient states he is doing well on current medications. Rama't scheduled on 1/20/2021 in [place].       Medication: Sumatriptan    Date of last refill: 5/26/20 (#9/0)  [place]     Last office visit: 6/2/2020  Due back to

## 2020-11-06 RX ORDER — SUMATRIPTAN 100 MG/1
TABLET, FILM COATED ORAL
Qty: 9 TABLET | Refills: 0 | Status: SHIPPED | OUTPATIENT
Start: 2020-11-06 | End: 2021-05-26

## 2020-11-19 ENCOUNTER — TELEPHONE (OUTPATIENT)
Dept: CARDIOLOGY | Age: 60
End: 2020-11-19

## 2020-11-20 DIAGNOSIS — Q64.4 URACHAL CYST: ICD-10-CM

## 2020-11-20 DIAGNOSIS — G43.009 MIGRAINE WITHOUT AURA AND WITHOUT STATUS MIGRAINOSUS, NOT INTRACTABLE: ICD-10-CM

## 2020-11-20 DIAGNOSIS — R19.8 UMBILICAL BLEEDING: ICD-10-CM

## 2020-11-20 RX ORDER — METOPROLOL SUCCINATE 25 MG/1
25 TABLET, EXTENDED RELEASE ORAL DAILY
Qty: 15 TABLET | Refills: 0 | Status: SHIPPED | OUTPATIENT
Start: 2020-11-20 | End: 2021-03-17 | Stop reason: SDUPTHER

## 2020-11-20 NOTE — TELEPHONE ENCOUNTER
Pt called, needs refills on Carisoprodol 350 MG Oral Tab and HYDROcodone-acetaminophen 7.5-325 MG Oral Tab. Pt out of town right now. Pharmacy [address] [phone].     Please call pt at [phone]

## 2020-11-21 RX ORDER — HYDROCODONE BITARTRATE AND ACETAMINOPHEN 7.5; 325 MG/1; MG/1
TABLET ORAL
Qty: 180 TABLET | Refills: 0 | Status: SHIPPED | OUTPATIENT
Start: 2020-11-21 | End: 2020-12-21

## 2020-11-21 RX ORDER — CARISOPRODOL 350 MG/1
TABLET ORAL
Qty: 120 TABLET | Refills: 1 | Status: SHIPPED | OUTPATIENT
Start: 2020-11-21 | End: 2020-12-21

## 2020-12-21 DIAGNOSIS — R19.8 UMBILICAL BLEEDING: ICD-10-CM

## 2020-12-21 DIAGNOSIS — G43.009 MIGRAINE WITHOUT AURA AND WITHOUT STATUS MIGRAINOSUS, NOT INTRACTABLE: ICD-10-CM

## 2020-12-21 DIAGNOSIS — Q64.4 URACHAL CYST: ICD-10-CM

## 2020-12-21 RX ORDER — HYDROCODONE BITARTRATE AND ACETAMINOPHEN 7.5; 325 MG/1; MG/1
TABLET ORAL
Qty: 180 TABLET | Refills: 0 | Status: SHIPPED | OUTPATIENT
Start: 2020-12-21 | End: 2021-01-20

## 2020-12-21 RX ORDER — ROSUVASTATIN CALCIUM 10 MG/1
10 TABLET, COATED ORAL EVERY EVENING
Qty: 90 TABLET | Refills: 2 | Status: SHIPPED | OUTPATIENT
Start: 2020-12-21 | End: 2021-03-19

## 2020-12-21 RX ORDER — CARISOPRODOL 350 MG/1
TABLET ORAL
Qty: 120 TABLET | Refills: 1 | Status: SHIPPED | OUTPATIENT
Start: 2020-12-21 | End: 2021-01-20

## 2020-12-21 NOTE — TELEPHONE ENCOUNTER
Carisoprodol 350 MG Oral Tab    AND     HYDROcodone-acetaminophen 7.5-325 MG Oral Tab    AND CHOLESTEROL MEDS (DOESN'T KNOW NAME OF MEDS)    PLEASE SEND  [address]

## 2020-12-21 NOTE — TELEPHONE ENCOUNTER
LOV: 5/4/20   Last Refill:   Carisoprodol 11/21/20 #120 RF   [place] 11/21/20 #180 0 RF  Rosuvastatin  6/13/20 #90 2 RF    Future Appointments   Date Time Provider Shaggy Harmon   1/20/2021  9:10 AM MD KARAN Bashir

## 2021-01-17 DIAGNOSIS — G43.101 MIGRAINE WITH AURA AND WITH STATUS MIGRAINOSUS, NOT INTRACTABLE: ICD-10-CM

## 2021-01-18 NOTE — TELEPHONE ENCOUNTER
Medication:TOPIRAMATE 100 MG Oral Tab  Date of last refill: 10/29/2020 (#360/0)  [place]    Last office visit: 6/2/2020  Due back to clinic per last office note:  6 months  Date next office visit scheduled:    Future

## 2021-01-19 RX ORDER — TOPIRAMATE 100 MG/1
TABLET, FILM COATED ORAL
Qty: 360 TABLET | Refills: 3 | Status: SHIPPED | OUTPATIENT
Start: 2021-01-19

## 2021-01-20 ENCOUNTER — OFFICE VISIT (OUTPATIENT)
Dept: NEUROLOGY | Facility: CLINIC | Age: 61
End: 2021-01-20
Payer: COMMERCIAL

## 2021-01-20 VITALS
HEIGHT: 72 IN | DIASTOLIC BLOOD PRESSURE: 78 MMHG | RESPIRATION RATE: 16 BRPM | HEART RATE: 76 BPM | WEIGHT: 223 LBS | SYSTOLIC BLOOD PRESSURE: 136 MMHG | BODY MASS INDEX: 30.2 KG/M2

## 2021-01-20 DIAGNOSIS — Q64.4 URACHAL CYST: ICD-10-CM

## 2021-01-20 DIAGNOSIS — G43.009 MIGRAINE WITHOUT AURA AND WITHOUT STATUS MIGRAINOSUS, NOT INTRACTABLE: ICD-10-CM

## 2021-01-20 DIAGNOSIS — G43.009 MIGRAINE WITHOUT AURA AND WITHOUT STATUS MIGRAINOSUS, NOT INTRACTABLE: Primary | ICD-10-CM

## 2021-01-20 DIAGNOSIS — R19.8 UMBILICAL BLEEDING: ICD-10-CM

## 2021-01-20 PROCEDURE — 99213 OFFICE O/P EST LOW 20 MIN: CPT | Performed by: OTHER

## 2021-01-20 PROCEDURE — 3078F DIAST BP <80 MM HG: CPT | Performed by: OTHER

## 2021-01-20 PROCEDURE — 3075F SYST BP GE 130 - 139MM HG: CPT | Performed by: OTHER

## 2021-01-20 PROCEDURE — 3008F BODY MASS INDEX DOCD: CPT | Performed by: OTHER

## 2021-01-20 RX ORDER — HYDROCODONE BITARTRATE AND ACETAMINOPHEN 7.5; 325 MG/1; MG/1
TABLET ORAL
Qty: 180 TABLET | Refills: 0 | Status: SHIPPED | OUTPATIENT
Start: 2021-01-20 | End: 2021-02-18

## 2021-01-20 RX ORDER — CARISOPRODOL 350 MG/1
TABLET ORAL
Qty: 120 TABLET | Refills: 1 | Status: SHIPPED | OUTPATIENT
Start: 2021-01-20 | End: 2021-02-18

## 2021-01-20 NOTE — PATIENT INSTRUCTIONS
After your visit at the [place] office  today,  please direct any follow up questions or medication needs to the staff in our [place] office so that your concerns may be promptly addressed.   We are available through [employer] or at the numbers below: be picked up in office. • Please allow the office 2-3 business days to fill the prescription. • Patient must present photo ID at time of . PLEASE NOTE: PRESCRIPTIONS MUST BE PICKED UP PRIOR TO 3:00PM MONDAY-FRIDAY    Scheduling Tests:     If your submitting forms to office staff. • Form completion may require an additional fee. • A signed Release of Information (KURTIS) must be on file before forms may be submitted. When dropping off forms, please ask the  for this paper.    • Failure

## 2021-01-20 NOTE — PROGRESS NOTES
Neurology H&P    Mckay Reynolds Patient Status:  No patient class for patient encounter    3/12/1960 MRN [id number]   Location [facility] Attending No att. providers found   Hosp Day #  PCP Winferd Fly, DO     Subjective:  Initial Clinic HPI his stress level is better.        Current Medications:  Current Outpatient Medications   Medication Sig Dispense Refill   • TOPIRAMATE 100 MG Oral Tab TAKE 2 TABLETS BY MOUTH  TWICE DAILY 360 tablet 3   • Carisoprodol 350 MG Oral Tab Take 1 tablet by mouth DISEASES 1995    hepatitis drug induced   • Visual impairment     glasses       PSHx:  Past Surgical History:   Procedure Laterality Date   • BACK SURGERY     • BREAST BIOPSY Right 5/8/2015    Performed by Dexter Elizabeth DO at [facility] urination or difficulty urinating   Heme: no new bruising, no unexplained fevers or chills  Endocrine: no unexplained weight loss or gain, no new fatigue  Musculoskeletal: chronic low back pain  Psych: denies depression   Skin: denies any new masses, rashe weeks then stop   - Sumatriptan 100mg PO at onset of bad headache and can take 1 tab 2 hours later if needed.  No more than 2 tabs in a 24 hour period      - Exercise 3 times per week at least 30 minutes  - Caffeine only in moderation  - Encouraged proper h

## 2021-01-20 NOTE — TELEPHONE ENCOUNTER
Pt needs a refill of the  Carisoprodol 350 MG Oral Tab  And  HYDROcodone-acetaminophen 7.5-325 MG Oral Tab    [facility] in [place]

## 2021-01-20 NOTE — TELEPHONE ENCOUNTER
LOV: 5/4/20   Last Refill:  Carisoprodol  12/21/20  #120 1 RF  [place]   12/21/20 #180 0 RF    Future Appointments   Date Time Provider Shaggy Harmon   5/26/2021  9:00 AM DO ROXY Heart

## 2021-02-18 DIAGNOSIS — R19.8 UMBILICAL BLEEDING: ICD-10-CM

## 2021-02-18 DIAGNOSIS — G43.009 MIGRAINE WITHOUT AURA AND WITHOUT STATUS MIGRAINOSUS, NOT INTRACTABLE: ICD-10-CM

## 2021-02-18 DIAGNOSIS — Q64.4 URACHAL CYST: ICD-10-CM

## 2021-02-18 RX ORDER — HYDROCODONE BITARTRATE AND ACETAMINOPHEN 7.5; 325 MG/1; MG/1
TABLET ORAL
Qty: 180 TABLET | Refills: 0 | Status: SHIPPED | OUTPATIENT
Start: 2021-02-18 | End: 2021-03-19

## 2021-02-18 RX ORDER — CARISOPRODOL 350 MG/1
TABLET ORAL
Qty: 120 TABLET | Refills: 1 | Status: SHIPPED | OUTPATIENT
Start: 2021-02-18 | End: 2021-03-19

## 2021-02-18 NOTE — TELEPHONE ENCOUNTER
Pt called, needs refills on HYDROcodone-acetaminophen 7.5-325 MG Oral Tab and Carisoprodol 350 MG Oral Tab. Pharmacy[facility].   Please call pt at [phone]

## 2021-03-12 ENCOUNTER — TELEMEDICINE (OUTPATIENT)
Dept: FAMILY MEDICINE CLINIC | Facility: CLINIC | Age: 61
End: 2021-03-12
Payer: COMMERCIAL

## 2021-03-12 DIAGNOSIS — R53.83 FATIGUE, UNSPECIFIED TYPE: ICD-10-CM

## 2021-03-12 DIAGNOSIS — J01.00 ACUTE NON-RECURRENT MAXILLARY SINUSITIS: Primary | ICD-10-CM

## 2021-03-12 DIAGNOSIS — R51.9 FACIAL PAIN: ICD-10-CM

## 2021-03-12 DIAGNOSIS — M79.10 MYALGIA: ICD-10-CM

## 2021-03-12 PROCEDURE — 99214 OFFICE O/P EST MOD 30 MIN: CPT | Performed by: FAMILY MEDICINE

## 2021-03-12 RX ORDER — AMOXICILLIN AND CLAVULANATE POTASSIUM 500; 125 MG/1; MG/1
1 TABLET, FILM COATED ORAL 2 TIMES DAILY
Qty: 20 TABLET | Refills: 0 | Status: SHIPPED | OUTPATIENT
Start: 2021-03-12 | End: 2021-03-22

## 2021-03-12 NOTE — PROGRESS NOTES
This is a telemedicine visit with live, interactive video and audio. Patient understands and accepts financial responsibility for any deductible, co-insurance and/or co-pays associated with this service.     Abiodun Elizabeth has developed sinus sx on S Performed by Modesto Miller MD at [address] [place] [address] Right 9/12/2018    Performed by Suleiman Thompson DO at [facility] MAIN OR   • SHOULDER SURG [address] UNLISTED Left 02/21/2019   • VASECTOMY        Family History TABLETS PER 24 HOURS 9 tablet 0   • DULOXETINE HCL 60 MG Oral Cap DR Particles TAKE 1 CAPSULE BY MOUTH  TWICE DAILY 180 capsule 3   • Omega-3 Fatty Acids (FISH OIL OR) Take 2 capsules by mouth daily. • aspirin 81 MG Oral Tab Take 81 mg by mouth daily.

## 2021-03-17 ENCOUNTER — OFFICE VISIT (OUTPATIENT)
Dept: CARDIOLOGY | Age: 61
End: 2021-03-17

## 2021-03-17 VITALS
DIASTOLIC BLOOD PRESSURE: 70 MMHG | HEIGHT: 73 IN | HEART RATE: 60 BPM | BODY MASS INDEX: 30.75 KG/M2 | SYSTOLIC BLOOD PRESSURE: 120 MMHG | WEIGHT: 232 LBS

## 2021-03-17 DIAGNOSIS — R07.89 ATYPICAL CHEST PAIN: ICD-10-CM

## 2021-03-17 DIAGNOSIS — Z86.79 HISTORY OF ATRIAL FIBRILLATION: Primary | ICD-10-CM

## 2021-03-17 PROCEDURE — 99213 OFFICE O/P EST LOW 20 MIN: CPT | Performed by: INTERNAL MEDICINE

## 2021-03-17 RX ORDER — VIT D3/FOLIC ACID/B2/B6/B12 2000-800
1000 TABLET ORAL
COMMUNITY

## 2021-03-17 RX ORDER — METOPROLOL SUCCINATE 25 MG/1
25 TABLET, EXTENDED RELEASE ORAL DAILY
Qty: 90 TABLET | Refills: 3 | Status: SHIPPED | OUTPATIENT
Start: 2021-03-17

## 2021-03-17 RX ORDER — METOPROLOL SUCCINATE 25 MG/1
25 TABLET, EXTENDED RELEASE ORAL DAILY
Qty: 30 TABLET | Refills: 1 | Status: SHIPPED | OUTPATIENT
Start: 2021-03-17 | End: 2021-03-17 | Stop reason: SDUPTHER

## 2021-03-17 ASSESSMENT — PATIENT HEALTH QUESTIONNAIRE - PHQ9
SUM OF ALL RESPONSES TO PHQ9 QUESTIONS 1 AND 2: 0
CLINICAL INTERPRETATION OF PHQ9 SCORE: NO FURTHER SCREENING NEEDED
SUM OF ALL RESPONSES TO PHQ9 QUESTIONS 1 AND 2: 0
1. LITTLE INTEREST OR PLEASURE IN DOING THINGS: NOT AT ALL
CLINICAL INTERPRETATION OF PHQ2 SCORE: NO FURTHER SCREENING NEEDED
2. FEELING DOWN, DEPRESSED OR HOPELESS: NOT AT ALL

## 2021-03-19 DIAGNOSIS — G43.009 MIGRAINE WITHOUT AURA AND WITHOUT STATUS MIGRAINOSUS, NOT INTRACTABLE: ICD-10-CM

## 2021-03-19 DIAGNOSIS — R19.8 UMBILICAL BLEEDING: ICD-10-CM

## 2021-03-19 DIAGNOSIS — Q64.4 URACHAL CYST: ICD-10-CM

## 2021-03-19 RX ORDER — HYDROCODONE BITARTRATE AND ACETAMINOPHEN 7.5; 325 MG/1; MG/1
TABLET ORAL
Qty: 180 TABLET | Refills: 0 | Status: SHIPPED | OUTPATIENT
Start: 2021-03-19 | End: 2021-08-09

## 2021-03-19 RX ORDER — CARISOPRODOL 350 MG/1
TABLET ORAL
Qty: 120 TABLET | Refills: 1 | Status: SHIPPED | OUTPATIENT
Start: 2021-03-19 | End: 2021-04-12

## 2021-03-19 RX ORDER — ROSUVASTATIN CALCIUM 10 MG/1
10 TABLET, COATED ORAL EVERY EVENING
Qty: 90 TABLET | Refills: 2 | Status: SHIPPED | OUTPATIENT
Start: 2021-03-19

## 2021-03-19 NOTE — TELEPHONE ENCOUNTER
PT CALLED AND ADV NEEDS REFILLS OF:       Carisoprodol 350 MG Oral Tab    HYDROcodone-acetaminophen 7.5-325 MG Oral Tab    Rosuvastatin Calcium 10 MG Oral Tab        [facility]     THANK YOU

## 2021-04-12 ENCOUNTER — OFFICE VISIT (OUTPATIENT)
Dept: FAMILY MEDICINE CLINIC | Facility: CLINIC | Age: 61
End: 2021-04-12
Payer: COMMERCIAL

## 2021-04-12 ENCOUNTER — HOSPITAL ENCOUNTER (OUTPATIENT)
Dept: GENERAL RADIOLOGY | Age: 61
Discharge: HOME | End: 2021-04-12
Attending: FAMILY MEDICINE
Payer: COMMERCIAL

## 2021-04-12 VITALS
SYSTOLIC BLOOD PRESSURE: 136 MMHG | BODY MASS INDEX: 31.74 KG/M2 | DIASTOLIC BLOOD PRESSURE: 84 MMHG | HEART RATE: 70 BPM | HEIGHT: 72 IN | WEIGHT: 234.38 LBS | TEMPERATURE: 98 F | OXYGEN SATURATION: 99 %

## 2021-04-12 DIAGNOSIS — Z20.822 SUSPECTED COVID-19 VIRUS INFECTION: ICD-10-CM

## 2021-04-12 DIAGNOSIS — Z00.00 ROUTINE HEALTH MAINTENANCE: Primary | ICD-10-CM

## 2021-04-12 DIAGNOSIS — M48.061 SPINAL STENOSIS OF LUMBAR REGION WITHOUT NEUROGENIC CLAUDICATION: ICD-10-CM

## 2021-04-12 DIAGNOSIS — R19.8 UMBILICAL BLEEDING: ICD-10-CM

## 2021-04-12 DIAGNOSIS — R73.09 ELEVATED GLUCOSE: ICD-10-CM

## 2021-04-12 PROCEDURE — 80061 LIPID PANEL: CPT | Performed by: FAMILY MEDICINE

## 2021-04-12 PROCEDURE — 3079F DIAST BP 80-89 MM HG: CPT | Performed by: FAMILY MEDICINE

## 2021-04-12 PROCEDURE — 80050 GENERAL HEALTH PANEL: CPT | Performed by: FAMILY MEDICINE

## 2021-04-12 PROCEDURE — 3075F SYST BP GE 130 - 139MM HG: CPT | Performed by: FAMILY MEDICINE

## 2021-04-12 PROCEDURE — 72110 X-RAY EXAM L-2 SPINE 4/>VWS: CPT | Performed by: FAMILY MEDICINE

## 2021-04-12 PROCEDURE — 99396 PREV VISIT EST AGE 40-64: CPT | Performed by: FAMILY MEDICINE

## 2021-04-12 PROCEDURE — 83036 HEMOGLOBIN GLYCOSYLATED A1C: CPT | Performed by: FAMILY MEDICINE

## 2021-04-12 PROCEDURE — 84153 ASSAY OF PSA TOTAL: CPT | Performed by: FAMILY MEDICINE

## 2021-04-12 PROCEDURE — 3008F BODY MASS INDEX DOCD: CPT | Performed by: FAMILY MEDICINE

## 2021-04-12 RX ORDER — METOPROLOL SUCCINATE 25 MG/1
TABLET, EXTENDED RELEASE ORAL
COMMUNITY
Start: 2021-03-19

## 2021-04-12 RX ORDER — DULOXETIN HYDROCHLORIDE 60 MG/1
60 CAPSULE, DELAYED RELEASE ORAL DAILY
COMMUNITY
Start: 2021-04-12

## 2021-04-12 RX ORDER — CARISOPRODOL 350 MG/1
TABLET ORAL
Qty: 120 TABLET | Refills: 1 | COMMUNITY
Start: 2021-04-12 | End: 2021-06-16

## 2021-04-12 NOTE — PROGRESS NOTES
Veronica Bernal is a 63year old male who presents for a complete physical exam.   HPI:   Pt complains of having more back issues as of late, he is having more back issues and gait weakness on the left, where he had a pedicle screw drilled into his left L5- capsule (60 mg total) by mouth daily. • Carisoprodol 350 MG Oral Tab Take 1 tablet by mouth 3  times daily as needed for muscle spasms 120 tablet 1   • Rosuvastatin Calcium 10 MG Oral Tab Take 1 tablet (10 mg total) by mouth every evening.  90 tablet 2 Performed by Maryam Soto DO at [facility] MAIN OR   • LUMBAR FACET INJECTION OR MEDIAL [address] Bilateral 3/19/2012    Performed by Bernadette Tate MD at [address]      left knee    • OTHER      left ankle    • OTHER allergy or asthma    EXAM:   /84   Pulse 70   Temp 97.9 °F (36.6 °C) (Temporal)   Ht 6' (1.829 m)   Wt 234 lb 6.4 oz (106.3 kg)   SpO2 99%   BMI 31.79 kg/m²   Body mass index is 31.79 kg/m².    GENERAL: well developed, well nourished,in no apparent di Metabolic Panel (14) [E]      PSA (Screening) [E]      SARS-CoV-2 Total Antibody [E]      Meds & Refills for this Visit:  Requested Prescriptions      No prescriptions requested or ordered in this encounter       Imaging & Consults:  SURGERY - INTERNAL  Fo

## 2021-04-13 ENCOUNTER — TELEPHONE (OUTPATIENT)
Dept: FAMILY MEDICINE CLINIC | Facility: CLINIC | Age: 61
End: 2021-04-13

## 2021-04-13 DIAGNOSIS — R73.09 ELEVATED GLUCOSE: Primary | ICD-10-CM

## 2021-04-14 ENCOUNTER — TELEPHONE (OUTPATIENT)
Dept: FAMILY MEDICINE CLINIC | Facility: CLINIC | Age: 61
End: 2021-04-14

## 2021-04-14 NOTE — TELEPHONE ENCOUNTER
----- Message from Ritchie Ceja DO sent at 4/14/2021 11:42 AM CDT -----  [place] Puffer  labs looked very good, except for his lipids, which  were higher than previously noted.  otherwise  Excellent kidney, liver function, blood sugar and thyroid we

## 2021-04-14 NOTE — TELEPHONE ENCOUNTER
Pt was advised of results- verbalized understanding    Pt states he was out of meds- that is why Lipids were so heydi

## 2021-04-16 DIAGNOSIS — G43.009 MIGRAINE WITHOUT AURA AND WITHOUT STATUS MIGRAINOSUS, NOT INTRACTABLE: ICD-10-CM

## 2021-04-16 DIAGNOSIS — R19.8 UMBILICAL BLEEDING: ICD-10-CM

## 2021-04-16 DIAGNOSIS — Q64.4 URACHAL CYST: ICD-10-CM

## 2021-04-16 NOTE — TELEPHONE ENCOUNTER
PT CALLED AND ADV THAT HE IS LEAVING OUT OF TOWN TO GO TO [place] TOMORROW AND WAS WONDERING IF HE CAN GET REFILL OF     HYDROcodone-acetaminophen 7.5-325 MG Oral Tab    DID ADV PT THAT CARISOPRODOL WAS REFILL ON 4/12/21, HE WILL CALL JUSTINA TO VERIFY.

## 2021-04-17 RX ORDER — HYDROCODONE BITARTRATE AND ACETAMINOPHEN 7.5; 325 MG/1; MG/1
TABLET ORAL
Qty: 180 TABLET | Refills: 0 | Status: SHIPPED | OUTPATIENT
Start: 2021-04-17 | End: 2021-04-20

## 2021-04-20 DIAGNOSIS — Q64.4 URACHAL CYST: ICD-10-CM

## 2021-04-20 DIAGNOSIS — G43.009 MIGRAINE WITHOUT AURA AND WITHOUT STATUS MIGRAINOSUS, NOT INTRACTABLE: ICD-10-CM

## 2021-04-20 DIAGNOSIS — R19.8 UMBILICAL BLEEDING: ICD-10-CM

## 2021-04-20 RX ORDER — HYDROCODONE BITARTRATE AND ACETAMINOPHEN 7.5; 325 MG/1; MG/1
TABLET ORAL
Qty: 180 TABLET | Refills: 0 | Status: SHIPPED | OUTPATIENT
Start: 2021-04-20 | End: 2021-05-19

## 2021-04-20 NOTE — TELEPHONE ENCOUNTER
Routing to provider- medication is pended for out of town pharmacy    Please route back to RN so we can cancel at local pharmacy if appropriate

## 2021-04-20 NOTE — TELEPHONE ENCOUNTER
Pt was not able to get the Norco script from [facility] on Saturday before having to leave for a family emergency in [place].  He would like to know if we can cancel the script for [facility] and Send a new script to [facility]

## 2021-04-27 ENCOUNTER — OFFICE VISIT (OUTPATIENT)
Dept: SURGERY | Facility: CLINIC | Age: 61
End: 2021-04-27
Payer: COMMERCIAL

## 2021-04-27 VITALS — BODY MASS INDEX: 31.69 KG/M2 | WEIGHT: 234 LBS | HEIGHT: 72 IN | HEART RATE: 62 BPM

## 2021-04-27 DIAGNOSIS — R10.31 RIGHT LOWER QUADRANT ABDOMINAL PAIN: Primary | ICD-10-CM

## 2021-04-27 PROCEDURE — 3008F BODY MASS INDEX DOCD: CPT | Performed by: SURGERY

## 2021-04-27 PROCEDURE — 99244 OFF/OP CNSLTJ NEW/EST MOD 40: CPT | Performed by: SURGERY

## 2021-04-27 NOTE — H&P
Romario Ba is a 63year old male  Patient presents with:  Abdominal Pain: Patient referred by dr. Kvng Cabral for possible hernia consult. c/o right lower abd pain around the same time he has umbilical and rectal bleeding.        REFERRED BY    Patient latia surgery   • Osteoarthritis     SPINE, KNEES   • OTHER DISEASES 1995    hepatitis drug induced   • Visual impairment     glasses     Past Surgical History:   Procedure Laterality Date   • BACK SURGERY     • BREAST BIOPSY Right 5/8/2015    Procedure: BREAST Disp: 360 tablet, Rfl: 3  SUMAtriptan Succinate 100 MG Oral Tab, TAKE 1 TABLET BY MOUTH AT ONSET OF BAD HEADACHE AND MAY TAKE 1 TABLET 2 HOURS LATER IF NEEDED.  NO MORE THAN 2 TABLETS PER 24 HOURS, Disp: 9 tablet, Rfl: 0  Omega-3 Fatty Acids (FISH OIL OR), 72\", weight 234 lb (106.1 kg). GENERAL: well developed, well nourished male, in no apparent distress.     MENTAL STATUS :Alert, oriented x 3  PSYCH: normal mood and affect  SKIN: anicteric, no rashes, no bruising  EYES: PERRLA, EOMI, sclera anicteric,  co mg/dL       Very high >=190 mg/dL       • FASTING 04/12/2021 Yes   Final   • TSH 04/12/2021 1.550  0.358 - 3.740 mIU/mL Final    This test may exhibit interference when a sample is collected from a person who is consuming high dose of biotin (a.k.ahuber Heath <=4.00 ng/mL Final    Comment: INTERPRETIVE INFORMATION: TOTAL PROSTATE SPECIFIC ANTIGEN:    The Siemens Total PSA(TPSA)chemiluminescent (LOCI) immunoassay was used. Results obtained with different test methods or kits cannot be used interchangeably.   The 04/12/2021 43.0  35.1 - 46.3 fL Final   • Neutrophil Absolute Prelim 04/12/2021 3.09  1.50 - 7.70 x10 (3) uL Final   • Neutrophil Absolute 04/12/2021 3.09  1.50 - 7.70 x10(3) uL Final   • Lymphocyte Absolute 04/12/2021 1.81  1.00 - 4.00 x10(3) uL Final   •

## 2021-05-07 ENCOUNTER — HOSPITAL ENCOUNTER (OUTPATIENT)
Dept: CT IMAGING | Age: 61
Discharge: HOME | End: 2021-05-07
Attending: SURGERY
Payer: COMMERCIAL

## 2021-05-07 DIAGNOSIS — R10.31 RIGHT LOWER QUADRANT ABDOMINAL PAIN: ICD-10-CM

## 2021-05-07 PROCEDURE — 74177 CT ABD & PELVIS W/CONTRAST: CPT | Performed by: SURGERY

## 2021-05-10 ENCOUNTER — TELEPHONE (OUTPATIENT)
Dept: SURGERY | Facility: HOSPITAL | Age: 61
End: 2021-05-10

## 2021-05-10 DIAGNOSIS — N30.90 CYSTITIS: Primary | ICD-10-CM

## 2021-05-19 DIAGNOSIS — G43.009 MIGRAINE WITHOUT AURA AND WITHOUT STATUS MIGRAINOSUS, NOT INTRACTABLE: ICD-10-CM

## 2021-05-19 DIAGNOSIS — Q64.4 URACHAL CYST: ICD-10-CM

## 2021-05-19 DIAGNOSIS — R19.8 UMBILICAL BLEEDING: ICD-10-CM

## 2021-05-19 RX ORDER — HYDROCODONE BITARTRATE AND ACETAMINOPHEN 7.5; 325 MG/1; MG/1
TABLET ORAL
Qty: 180 TABLET | Refills: 0 | Status: SHIPPED | OUTPATIENT
Start: 2021-05-19 | End: 2021-06-16

## 2021-05-19 NOTE — TELEPHONE ENCOUNTER
LOV: 4/12/21   Last Refill: 4/20/21 #180 0 RF    Future Appointments   Date Time Provider Shaggy Harmon   5/26/2021  9:00 AM DO ROXY Melgoza [facility]

## 2021-05-26 ENCOUNTER — OFFICE VISIT (OUTPATIENT)
Dept: NEUROLOGY | Facility: CLINIC | Age: 61
End: 2021-05-26
Payer: COMMERCIAL

## 2021-05-26 VITALS
BODY MASS INDEX: 27 KG/M2 | DIASTOLIC BLOOD PRESSURE: 88 MMHG | HEART RATE: 68 BPM | SYSTOLIC BLOOD PRESSURE: 146 MMHG | WEIGHT: 200 LBS | RESPIRATION RATE: 16 BRPM

## 2021-05-26 DIAGNOSIS — G43.101 MIGRAINE WITH AURA AND WITH STATUS MIGRAINOSUS, NOT INTRACTABLE: ICD-10-CM

## 2021-05-26 PROCEDURE — 3079F DIAST BP 80-89 MM HG: CPT | Performed by: OTHER

## 2021-05-26 PROCEDURE — 99213 OFFICE O/P EST LOW 20 MIN: CPT | Performed by: OTHER

## 2021-05-26 PROCEDURE — 3077F SYST BP >= 140 MM HG: CPT | Performed by: OTHER

## 2021-05-26 RX ORDER — SUMATRIPTAN 100 MG/1
TABLET, FILM COATED ORAL
Qty: 9 TABLET | Refills: 2 | Status: SHIPPED | OUTPATIENT
Start: 2021-05-26

## 2021-05-26 NOTE — PATIENT INSTRUCTIONS
After your visit at the [facility] office  today,  please direct any follow up questions or medication needs to the staff in our [address] [place] [address] office so that your concerns may be promptly addressed.   We are available through [employer] or at the numbers below: be picked up in office. • Please allow the office 2-3 business days to fill the prescription. • Patient must present photo ID at time of . PLEASE NOTE: PRESCRIPTIONS MUST BE PICKED UP PRIOR TO 3:00PM MONDAY-FRIDAY    Scheduling Tests:     If your submitting forms to office staff. • Form completion may require an additional fee. • A signed Release of Information (KURTIS) must be on file before forms may be submitted. When dropping off forms, please ask the  for this paper.    • Failure

## 2021-05-26 NOTE — PROGRESS NOTES
Neurology H&P    Rondi Cousins Patient Status:  No patient class for patient encounter    3/12/1960 MRN [id number]   Location ED [facility] Attending No att. providers found   Hosp Day #  PCP Sandro Kimbrough DO     Subjective:  Initial Clinic HPI today      Current Medications:  Current Outpatient Medications   Medication Sig Dispense Refill   • HYDROcodone-acetaminophen 7.5-325 MG Oral Tab 1-2 po qid prn pain 180 tablet 0   • Metoprolol Succinate ER 25 MG Oral Tablet 24 Hr      • DULoxetine HCl 60 glasses       PSHx:  Past Surgical History:   Procedure Laterality Date   • BACK SURGERY     • BREAST BIOPSY Right 5/8/2015    Procedure: BREAST BIOPSY;  Surgeon: Keon Sam DO;  Location: [facility] MAIN OR   • CARPAL TUNNEL RELEASE Bilateral 1980   • CHO Objective/Physical Exam:    Vital Signs:  Blood pressure 146/88, pulse 68, resp. rate 16, weight 200 lb (90.7 kg).     HEENT: moist mucus membranes  Cardiovascular: Regular rate and rhythm, no murmur  Pulm: CTAB  GI: non-tender, normal bowel sounds    N hydration 6-8 glasses H2O per day  - Keep a headache diary to bring to next clinic appointment  - Encouraged proper sleep hygiene   - Keep note of possible food triggers  (ripe cheeses, MSG etc.)  - Limit EtOH consumption (no more than 2 beverages per day

## 2021-06-16 ENCOUNTER — TELEPHONE (OUTPATIENT)
Dept: FAMILY MEDICINE CLINIC | Facility: CLINIC | Age: 61
End: 2021-06-16

## 2021-06-16 DIAGNOSIS — R19.8 UMBILICAL BLEEDING: ICD-10-CM

## 2021-06-16 DIAGNOSIS — Q64.4 URACHAL CYST: ICD-10-CM

## 2021-06-16 DIAGNOSIS — G43.009 MIGRAINE WITHOUT AURA AND WITHOUT STATUS MIGRAINOSUS, NOT INTRACTABLE: ICD-10-CM

## 2021-06-16 RX ORDER — CARISOPRODOL 350 MG/1
TABLET ORAL
Qty: 120 TABLET | Refills: 1 | Status: SHIPPED | OUTPATIENT
Start: 2021-06-16 | End: 2021-08-09

## 2021-06-16 RX ORDER — HYDROCODONE BITARTRATE AND ACETAMINOPHEN 7.5; 325 MG/1; MG/1
TABLET ORAL
Qty: 180 TABLET | Refills: 0 | Status: SHIPPED | OUTPATIENT
Start: 2021-06-16 | End: 2021-07-17

## 2021-06-16 NOTE — TELEPHONE ENCOUNTER
Received fax from pharmacy stating Hydrocodone/Acetaminophen 7.5-325 mg requires is not covered by plan. Plan Exclusion. Per [facility], this prescription is under quantity limit but did not specify what the quantity was.   Called [facility] and they

## 2021-06-16 NOTE — TELEPHONE ENCOUNTER
Routing to provider per protocol. Hydrocodone acetaminophen 7.5-325 mg last refilled on 5/19/21 for # 180 with 0 rf. Carisoprodol 350 mg last refilled on 4/12/21 for # 120 with 1 rf. Last seen on 4/12/21. No future appointments. Thank you.

## 2021-06-16 NOTE — TELEPHONE ENCOUNTER
PT CALLED AND ADV NEEDS REFILL OF     HYDROcodone-acetaminophen 7.5-325 MG Oral Tab    AND    Carisoprodol 350 MG Oral Tab      [facility]       THANK YOU

## 2021-06-17 NOTE — TELEPHONE ENCOUNTER
Attempted PA via [employer]. Key [id number]. Received message stating [facility] Prior Authorization Department does not manage Prior Authorizations for this plan. Please contact member services phone number on the back of the member ID card.     Received

## 2021-06-17 NOTE — TELEPHONE ENCOUNTER
Received another fax stating [employer] Prior Authorization Department does not manage Prior Authorizations for this plan. Please contact member services phone number on the back of the member ID card. Will call patient to confirm if he pays OOP for this.

## 2021-07-16 DIAGNOSIS — G43.009 MIGRAINE WITHOUT AURA AND WITHOUT STATUS MIGRAINOSUS, NOT INTRACTABLE: ICD-10-CM

## 2021-07-16 DIAGNOSIS — Q64.4 URACHAL CYST: ICD-10-CM

## 2021-07-16 DIAGNOSIS — R19.8 UMBILICAL BLEEDING: ICD-10-CM

## 2021-07-16 NOTE — TELEPHONE ENCOUNTER
Patient called requesting refills for:    HYDROcodone-acetaminophen 7.5-325 MG Oral Tab 180 tablet        Carisoprodol 350 MG Oral Tab 120 tablet       [facility] #[id number] [address] [place] [address] ([address]), [phone]

## 2021-07-16 NOTE — TELEPHONE ENCOUNTER
LOV 04/12/2021  Last labs 04/12/2021  Last refill on 06/16/2021, for #120 tabs, with 1 refills  Carisoprodol 350 MG Oral Tab    Last refill on 06/16/2021, for #180 tabs, with 0 refills  HYDROcodone-acetaminophen 7.5-325 MG Oral Tab    No future appointment

## 2021-07-17 RX ORDER — HYDROCODONE BITARTRATE AND ACETAMINOPHEN 7.5; 325 MG/1; MG/1
TABLET ORAL
Qty: 180 TABLET | Refills: 0 | Status: SHIPPED | OUTPATIENT
Start: 2021-07-17 | End: 2021-08-09

## 2021-08-09 ENCOUNTER — TELEPHONE (OUTPATIENT)
Dept: FAMILY MEDICINE CLINIC | Facility: CLINIC | Age: 61
End: 2021-08-09

## 2021-08-09 DIAGNOSIS — Q64.4 URACHAL CYST: ICD-10-CM

## 2021-08-09 DIAGNOSIS — R19.8 UMBILICAL BLEEDING: ICD-10-CM

## 2021-08-09 DIAGNOSIS — Z11.52 ENCOUNTER FOR SCREENING FOR COVID-19: Primary | ICD-10-CM

## 2021-08-09 DIAGNOSIS — G43.009 MIGRAINE WITHOUT AURA AND WITHOUT STATUS MIGRAINOSUS, NOT INTRACTABLE: ICD-10-CM

## 2021-08-09 RX ORDER — CARISOPRODOL 350 MG/1
TABLET ORAL
Qty: 120 TABLET | Refills: 1 | Status: SHIPPED | OUTPATIENT
Start: 2021-08-09 | End: 2021-09-15

## 2021-08-09 RX ORDER — HYDROCODONE BITARTRATE AND ACETAMINOPHEN 7.5; 325 MG/1; MG/1
TABLET ORAL
Qty: 180 TABLET | Refills: 0 | Status: SHIPPED | OUTPATIENT
Start: 2021-08-09 | End: 2021-09-15

## 2021-08-09 NOTE — TELEPHONE ENCOUNTER
Medications pended for provider review- note placed that provider okays early  since pt is leaving the country    Can you also place COVID test?    Route [place] to [place]

## 2021-08-12 ENCOUNTER — OFFICE VISIT (OUTPATIENT)
Dept: FAMILY MEDICINE CLINIC | Facility: CLINIC | Age: 61
End: 2021-08-12

## 2021-08-12 VITALS
BODY MASS INDEX: 29.12 KG/M2 | TEMPERATURE: 98 F | WEIGHT: 215 LBS | HEART RATE: 66 BPM | RESPIRATION RATE: 14 BRPM | OXYGEN SATURATION: 98 % | DIASTOLIC BLOOD PRESSURE: 98 MMHG | HEIGHT: 72 IN | SYSTOLIC BLOOD PRESSURE: 146 MMHG

## 2021-08-12 DIAGNOSIS — Z11.52 ENCOUNTER FOR SCREENING FOR COVID-19: Primary | ICD-10-CM

## 2021-08-12 LAB
OPERATOR ID: NORMAL
POCT LOT NUMBER: NORMAL
RAPID SARS-COV-2 BY PCR: NOT DETECTED

## 2021-08-12 PROCEDURE — U0002 COVID-19 LAB TEST NON-CDC: HCPCS | Performed by: PHYSICIAN ASSISTANT

## 2021-08-12 NOTE — PROGRESS NOTES
CHIEF COMPLAINT:   Patient presents with:  Covid-19 Test      HPI:   Lizzy Smith is a 63year old male who presents for Covid testing.    Patient denies any symptoms of COVID including fevers, chills, sweats, body aches, cough, SOB, chest pain, nasal con 5/8/2015    Procedure: BREAST BIOPSY;  Surgeon: Akash Peters DO;  Location: [facility] MAIN OR   • CARPAL TUNNEL RELEASE Bilateral 1980   • CHOLECYSTECTOMY     • COLONOSCOPY N/A 8/15/2018    Procedure: COLONOSCOPY, POSSIBLE BIOPSY, POSSIBLE POLYPECTOMY 80013; no apparent distress  SKIN: no rashes,no suspicious lesions  HEAD: atraumatic, normocephalic. No tenderness on palpation of maxillary sinuses. No tenderness on palpation of frontal sinuses.   EYES: conjunctiva clear, EOM intact  EARS: TM's not erythematou

## 2021-09-15 DIAGNOSIS — R19.8 UMBILICAL BLEEDING: ICD-10-CM

## 2021-09-15 DIAGNOSIS — Q64.4 URACHAL CYST: ICD-10-CM

## 2021-09-15 DIAGNOSIS — G43.009 MIGRAINE WITHOUT AURA AND WITHOUT STATUS MIGRAINOSUS, NOT INTRACTABLE: ICD-10-CM

## 2021-09-15 RX ORDER — HYDROCODONE BITARTRATE AND ACETAMINOPHEN 7.5; 325 MG/1; MG/1
TABLET ORAL
Qty: 180 TABLET | Refills: 0 | Status: SHIPPED | OUTPATIENT
Start: 2021-09-15 | End: 2021-10-14

## 2021-09-15 RX ORDER — CARISOPRODOL 350 MG/1
TABLET ORAL
Qty: 120 TABLET | Refills: 1 | Status: SHIPPED | OUTPATIENT
Start: 2021-09-15 | End: 2021-10-14

## 2021-09-15 NOTE — TELEPHONE ENCOUNTER
Pt calling for prescription refill on     HYDROcodone-acetaminophen 7.5-325 MG Oral Tab    And     Carisoprodol 350 MG Oral Tab    Pt verified pharmacy in       [address] [facility] [address] ([address]), 8

## 2021-10-14 DIAGNOSIS — Q64.4 URACHAL CYST: ICD-10-CM

## 2021-10-14 DIAGNOSIS — R19.8 UMBILICAL BLEEDING: ICD-10-CM

## 2021-10-14 DIAGNOSIS — G43.009 MIGRAINE WITHOUT AURA AND WITHOUT STATUS MIGRAINOSUS, NOT INTRACTABLE: ICD-10-CM

## 2021-10-14 RX ORDER — HYDROCODONE BITARTRATE AND ACETAMINOPHEN 7.5; 325 MG/1; MG/1
TABLET ORAL
Qty: 180 TABLET | Refills: 0 | Status: SHIPPED | OUTPATIENT
Start: 2021-10-14 | End: 2021-11-08

## 2021-10-14 RX ORDER — CARISOPRODOL 350 MG/1
TABLET ORAL
Qty: 120 TABLET | Refills: 1 | Status: SHIPPED | OUTPATIENT
Start: 2021-10-14 | End: 2021-11-08

## 2021-10-14 NOTE — TELEPHONE ENCOUNTER
LOV: 4/12/21   Last Refill:   Hydrocodone 9/15/21 #180 0 RF  Carisoprodol 9/15/21 #120     No future appointments. ABDOMINAL PAIN

## 2021-10-14 NOTE — TELEPHONE ENCOUNTER
PT CALLED AND ADV NEEDS REFILL OF     HYDROcodone-acetaminophen 7.5-325 MG Oral Tab    AND    Carisoprodol 350 MG Oral Tab    PLEASE SEND TO [facility]       THANK YOU

## 2021-11-08 DIAGNOSIS — Q64.4 URACHAL CYST: ICD-10-CM

## 2021-11-08 DIAGNOSIS — R19.8 UMBILICAL BLEEDING: ICD-10-CM

## 2021-11-08 DIAGNOSIS — G43.009 MIGRAINE WITHOUT AURA AND WITHOUT STATUS MIGRAINOSUS, NOT INTRACTABLE: ICD-10-CM

## 2021-11-08 RX ORDER — HYDROCODONE BITARTRATE AND ACETAMINOPHEN 7.5; 325 MG/1; MG/1
TABLET ORAL
Qty: 180 TABLET | Refills: 0 | Status: SHIPPED | OUTPATIENT
Start: 2021-11-08 | End: 2021-12-14

## 2021-11-08 RX ORDER — CARISOPRODOL 350 MG/1
TABLET ORAL
Qty: 120 TABLET | Refills: 1 | Status: SHIPPED | OUTPATIENT
Start: 2021-11-08

## 2021-11-08 NOTE — TELEPHONE ENCOUNTER
PT NEEDS REFILL ON-      HYDROcodone-acetaminophen 7.5-325 MG Oral Tab    carisoprodol 350 MG Oral Tab    PT WILL BE TRAVELING UNTIL 11/22/21 AND WILL RUN OUT BEFORE THEN. NEEDS TO PICK THEM UP 11/9/21.     [facility]ORE [address] [place] [address]

## 2021-11-08 NOTE — TELEPHONE ENCOUNTER
LOV:   Last Refill:  Carisoprodol #120 1 RF  Norco 10/14/21 #180 0 RF    Pharmacist closed out refill pending on Carisoprodol we need to send new script that states okay to fill early

## 2021-12-14 DIAGNOSIS — Q64.4 URACHAL CYST: ICD-10-CM

## 2021-12-14 DIAGNOSIS — G43.009 MIGRAINE WITHOUT AURA AND WITHOUT STATUS MIGRAINOSUS, NOT INTRACTABLE: ICD-10-CM

## 2021-12-14 DIAGNOSIS — R19.8 UMBILICAL BLEEDING: ICD-10-CM

## 2021-12-14 RX ORDER — HYDROCODONE BITARTRATE AND ACETAMINOPHEN 7.5; 325 MG/1; MG/1
TABLET ORAL
Qty: 180 TABLET | Refills: 0 | Status: SHIPPED | OUTPATIENT
Start: 2021-12-14 | End: 2022-01-13

## 2021-12-14 NOTE — TELEPHONE ENCOUNTER
HYDROcodone-acetaminophen 7.5-325 MG Oral Tab    [facility] #[id number] [address] AT [address] [place] [address] ([address]), [phone], [phone]    PT CALLING FOR PRESCRIPTION REFILL ON MEDICATION VERIFIED PHARMACY     THANK YOU

## 2022-01-13 DIAGNOSIS — Q64.4 URACHAL CYST: ICD-10-CM

## 2022-01-13 DIAGNOSIS — R19.8 UMBILICAL BLEEDING: ICD-10-CM

## 2022-01-13 DIAGNOSIS — G43.009 MIGRAINE WITHOUT AURA AND WITHOUT STATUS MIGRAINOSUS, NOT INTRACTABLE: ICD-10-CM

## 2022-01-13 RX ORDER — HYDROCODONE BITARTRATE AND ACETAMINOPHEN 7.5; 325 MG/1; MG/1
TABLET ORAL
Qty: 180 TABLET | Refills: 0 | Status: SHIPPED | OUTPATIENT
Start: 2022-01-13

## 2022-01-13 NOTE — TELEPHONE ENCOUNTER
LOV 4/12/21  Last refill on 12/14/21, for #180 tabs, with 0 refills  HYDROcodone-acetaminophen 7.5-325 MG Oral Tab    No future appointments. Order(s) pending, please review. Thank you.

## 2022-01-13 NOTE — TELEPHONE ENCOUNTER
[facility] #[id number] - [id number], [address] AT [address] [facility] ([address]), [phone], [phone]    HYDROcodone-acetaminophen 7.5-325 MG Oral Tab    PT CALLED REQUESTING A REFILL ON MEDICATION VERIFIED PHARMACY     THANK YOU

## 2022-01-31 ENCOUNTER — ORDER TRANSCRIPTION (OUTPATIENT)
Dept: ADMINISTRATIVE | Facility: HOSPITAL | Age: 62
End: 2022-01-31

## 2022-01-31 DIAGNOSIS — Z13.6 SCREENING FOR CARDIOVASCULAR CONDITION: Primary | ICD-10-CM

## 2022-02-11 NOTE — TELEPHONE ENCOUNTER
Pt needs a refill of these meds sent to the [facility] please.      HYDROcodone-acetaminophen 7.5-325 MG Oral Tab   carisoprodol 350 MG Oral Tab     Thank you

## 2022-02-12 RX ORDER — HYDROCODONE BITARTRATE AND ACETAMINOPHEN 7.5; 325 MG/1; MG/1
TABLET ORAL
Qty: 180 TABLET | Refills: 0 | Status: SHIPPED | OUTPATIENT
Start: 2022-02-12 | End: 2022-03-14

## 2022-02-25 NOTE — TELEPHONE ENCOUNTER
carisoprodol 350 MG Oral Tab        Pt would like refill sent to  [place] [address] [address] [facility], [address] [place], [place] [address]bhavani [address] [place] [address] ([address]), [phone], [phone]

## 2022-02-26 RX ORDER — CARISOPRODOL 350 MG/1
TABLET ORAL
Qty: 120 TABLET | Refills: 1 | Status: SHIPPED | OUTPATIENT
Start: 2022-02-26

## 2022-03-09 RX ORDER — ROSUVASTATIN CALCIUM 10 MG/1
TABLET, COATED ORAL
Qty: 90 TABLET | Refills: 2 | Status: SHIPPED | OUTPATIENT
Start: 2022-03-09

## 2022-03-14 RX ORDER — HYDROCODONE BITARTRATE AND ACETAMINOPHEN 7.5; 325 MG/1; MG/1
TABLET ORAL
Qty: 180 TABLET | Refills: 0 | Status: SHIPPED | OUTPATIENT
Start: 2022-03-14 | End: 2022-03-17

## 2022-03-14 NOTE — TELEPHONE ENCOUNTER
Patient requests refill    HYDROcodone-acetaminophen 7.5-325 MG Oral Tab    [facility]    Thank you

## 2022-03-14 NOTE — TELEPHONE ENCOUNTER
LOV: 4/12/21   Last Refill: 2/12/22  #180 0 RF    Future Appointments   Date Time Provider Shaggy Harmon   4/2/2022  9:30 AM [facility] CT MAIN RM[address] [facility] [address]

## 2022-03-17 RX ORDER — HYDROCODONE BITARTRATE AND ACETAMINOPHEN 7.5; 325 MG/1; MG/1
TABLET ORAL
Qty: 180 TABLET | Refills: 0 | Status: SHIPPED | OUTPATIENT
Start: 2022-03-17

## 2022-04-02 ENCOUNTER — HOSPITAL ENCOUNTER (OUTPATIENT)
Dept: CT IMAGING | Facility: HOSPITAL | Age: 62
Discharge: HOME | End: 2022-04-02
Attending: FAMILY MEDICINE

## 2022-04-02 DIAGNOSIS — Z13.6 SCREENING FOR CARDIOVASCULAR CONDITION: ICD-10-CM

## 2022-04-15 RX ORDER — HYDROCODONE BITARTRATE AND ACETAMINOPHEN 7.5; 325 MG/1; MG/1
TABLET ORAL
Qty: 180 TABLET | Refills: 0 | Status: SHIPPED | OUTPATIENT
Start: 2022-04-15

## 2022-05-10 RX ORDER — HYDROCODONE BITARTRATE AND ACETAMINOPHEN 7.5; 325 MG/1; MG/1
TABLET ORAL
Qty: 180 TABLET | Refills: 0 | Status: SHIPPED | OUTPATIENT
Start: 2022-05-10

## 2022-05-10 RX ORDER — CARISOPRODOL 350 MG/1
TABLET ORAL
Qty: 120 TABLET | Refills: 1 | Status: SHIPPED | OUTPATIENT
Start: 2022-05-10

## 2022-05-10 NOTE — TELEPHONE ENCOUNTER
PT CALLED AND AND NEEDS REFILL OF     carisoprodol 350 MG Oral Tab    AND     HYDROcodone-acetaminophen 7.5-325 MG Oral Tab    (MAY BE A FEW DAYS EARLY)       PLEASE SEND TO [facility]     THANK YOU

## 2022-05-10 NOTE — TELEPHONE ENCOUNTER
LOV: 4/12/21   Last Refill:  Norco  4/15/22  180 0 RF  Carisopradol 2/26/22  120 1 RF    No future appointments.

## 2022-05-19 DIAGNOSIS — G43.101 MIGRAINE WITH AURA AND WITH STATUS MIGRAINOSUS, NOT INTRACTABLE: ICD-10-CM

## 2022-05-19 RX ORDER — TOPIRAMATE 100 MG/1
200 TABLET, FILM COATED ORAL 2 TIMES DAILY
Qty: 120 TABLET | Refills: 0 | Status: SHIPPED | OUTPATIENT
Start: 2022-05-19 | End: 2022-07-18

## 2022-05-19 NOTE — TELEPHONE ENCOUNTER
Pt calling regarding needing a refill on TOPIRAMATE 100 MG Oral Tab. Pt is currently out of medication.       [facility] STORE [address] Joselito Muniz [address] [place] [address] ([address]), [phone], [phone]

## 2022-06-13 DIAGNOSIS — G43.009 MIGRAINE WITHOUT AURA AND WITHOUT STATUS MIGRAINOSUS, NOT INTRACTABLE: ICD-10-CM

## 2022-06-13 DIAGNOSIS — R19.8 UMBILICAL BLEEDING: ICD-10-CM

## 2022-06-13 DIAGNOSIS — Q64.4 URACHAL CYST: ICD-10-CM

## 2022-06-13 RX ORDER — HYDROCODONE BITARTRATE AND ACETAMINOPHEN 7.5; 325 MG/1; MG/1
TABLET ORAL
Qty: 180 TABLET | Refills: 0 | Status: SHIPPED | OUTPATIENT
Start: 2022-06-13

## 2022-06-13 RX ORDER — CARISOPRODOL 350 MG/1
TABLET ORAL
Qty: 120 TABLET | Refills: 1 | Status: SHIPPED | OUTPATIENT
Start: 2022-06-13

## 2022-06-13 NOTE — TELEPHONE ENCOUNTER
PT CALLED AND ADV NEEDS REFILLS OF     HYDROcodone-acetaminophen 7.5-325 MG Oral Tab    AND    carisoprodol 350 MG Oral Tab    PLEASE SEND TO [facility]       THANK YOU

## 2022-07-15 DIAGNOSIS — R19.8 UMBILICAL BLEEDING: ICD-10-CM

## 2022-07-15 DIAGNOSIS — G43.009 MIGRAINE WITHOUT AURA AND WITHOUT STATUS MIGRAINOSUS, NOT INTRACTABLE: ICD-10-CM

## 2022-07-15 DIAGNOSIS — Q64.4 URACHAL CYST: ICD-10-CM

## 2022-07-15 NOTE — TELEPHONE ENCOUNTER
No refill protocol for this medication. Hydrocodone:  Last refill: 6/13/2022 #180 with 0 refills    Carisoprodol:  Last refill: 6/13/2022 #120 with 1 refill  Last Visit: 4/12/2021   Next Visit: No future appointments. Forward to Dr. Shelly Ford please advise on refills. Thanks.

## 2022-07-15 NOTE — TELEPHONE ENCOUNTER
Pt needs a refill of:    HYDROcodone-acetaminophen 7.5-325 MG Oral Tab [[id number]] (Order [id number])    AND    carisoprodol 350 MG Oral Tab [[id number]] (Order [id number])      Please send to:    [address] [facility], [address] AT [address] [place] [address] ([address]), [phone], [phone]   [address] [place] [id number]   Phone: [phone] Fax: [phone]     Thank you!

## 2022-07-16 RX ORDER — HYDROCODONE BITARTRATE AND ACETAMINOPHEN 7.5; 325 MG/1; MG/1
TABLET ORAL
Qty: 180 TABLET | Refills: 0 | Status: SHIPPED | OUTPATIENT
Start: 2022-07-16

## 2022-07-16 RX ORDER — CARISOPRODOL 350 MG/1
TABLET ORAL
Qty: 120 TABLET | Refills: 1 | Status: SHIPPED | OUTPATIENT
Start: 2022-07-16

## 2022-07-17 DIAGNOSIS — G43.101 MIGRAINE WITH AURA AND WITH STATUS MIGRAINOSUS, NOT INTRACTABLE: ICD-10-CM

## 2022-07-18 RX ORDER — TOPIRAMATE 100 MG/1
TABLET, FILM COATED ORAL
Qty: 120 TABLET | Refills: 0 | Status: SHIPPED | OUTPATIENT
Start: 2022-07-18

## 2022-08-11 DIAGNOSIS — Q64.4 URACHAL CYST: ICD-10-CM

## 2022-08-11 DIAGNOSIS — R19.8 UMBILICAL BLEEDING: ICD-10-CM

## 2022-08-11 DIAGNOSIS — G43.009 MIGRAINE WITHOUT AURA AND WITHOUT STATUS MIGRAINOSUS, NOT INTRACTABLE: ICD-10-CM

## 2022-08-11 RX ORDER — CARISOPRODOL 350 MG/1
TABLET ORAL
Qty: 120 TABLET | Refills: 1 | Status: SHIPPED | OUTPATIENT
Start: 2022-08-11

## 2022-08-11 RX ORDER — HYDROCODONE BITARTRATE AND ACETAMINOPHEN 7.5; 325 MG/1; MG/1
TABLET ORAL
Qty: 180 TABLET | Refills: 0 | Status: SHIPPED | OUTPATIENT
Start: 2022-08-11

## 2022-08-11 NOTE — TELEPHONE ENCOUNTER
Routing to provider per protocol. carisoprodol 350 MG Oral Tab  Last refilled on 7/16/22 for #120  with 1 rf. HYDROcodone-acetaminophen 7.5-325 MG Oral Tab  Last refilled on 7/16/22 for #180  with 0 rf. Last labs 4/12/21. Last seen on 4/12/21. Future Appointments   Date Time Provider Shaggy Harmon   9/3/2022  9:30 AM Maxime Harding DO [facility]          Thank you.

## 2022-08-11 NOTE — TELEPHONE ENCOUNTER
PT CALLED AND ADV NEEDS REFILLS OF     carisoprodol 350 MG Oral Tab    AND    HYDROcodone-acetaminophen 7.5-325 MG Oral Tab    Donna Gonzales       Future Appointments   Date Time Provider Shaggy Harmon   9/3/2022  9:30 AM Nirmal Bangura [facility] SELWYN GANT

## 2022-08-31 DIAGNOSIS — G43.101 MIGRAINE WITH AURA AND WITH STATUS MIGRAINOSUS, NOT INTRACTABLE: ICD-10-CM

## 2022-09-01 RX ORDER — TOPIRAMATE 100 MG/1
TABLET, FILM COATED ORAL
Qty: 120 TABLET | Refills: 0 | OUTPATIENT
Start: 2022-09-01

## 2022-09-01 NOTE — TELEPHONE ENCOUNTER
Scheduled patient for [facility] location on 9/15/2022 at 1:40 pm.  Confirmed with patient. Pended 30 days and routed to provider.

## 2022-09-01 NOTE — TELEPHONE ENCOUNTER
I will not sign any refills until this patient is seen in clinic or has a scheduled appointment which he must keep

## 2022-09-14 DIAGNOSIS — G43.009 MIGRAINE WITHOUT AURA AND WITHOUT STATUS MIGRAINOSUS, NOT INTRACTABLE: ICD-10-CM

## 2022-09-14 DIAGNOSIS — Q64.4 URACHAL CYST: ICD-10-CM

## 2022-09-14 DIAGNOSIS — R19.8 UMBILICAL BLEEDING: ICD-10-CM

## 2022-09-14 RX ORDER — HYDROCODONE BITARTRATE AND ACETAMINOPHEN 7.5; 325 MG/1; MG/1
TABLET ORAL
Qty: 180 TABLET | Refills: 0 | Status: SHIPPED | OUTPATIENT
Start: 2022-09-14

## 2022-09-14 NOTE — TELEPHONE ENCOUNTER
[facility] DRUG STORE [address] [address] [address] AT [facility]0 [facility] ([address]), [phone], [phone]   [address]   Phone: [phone] Fax: [phone]     PATIENT REQUESTING REFILL FOR HYDROCODONE

## 2022-09-14 NOTE — TELEPHONE ENCOUNTER
Routing to provider per protocol. HYDROcodone-acetaminophen 7.5-325 MG Oral Tab  Last refilled on 8/11/22 for #180  with 0 rf. Last labs 4/12/21. Last seen on 4/12/21. Future Appointments   Date Time Provider Shaggy Harmon   9/15/2022  1:40 PM DO FRANCISCO Camargo [id number]   10/3/2022  6:00 PM Lucia Vyas DO [facility] SELWYN Lilly Likes          Thank you.

## 2022-09-15 ENCOUNTER — OFFICE VISIT (OUTPATIENT)
Dept: NEUROLOGY | Facility: CLINIC | Age: 62
End: 2022-09-15
Payer: COMMERCIAL

## 2022-09-15 VITALS
RESPIRATION RATE: 16 BRPM | BODY MASS INDEX: 30 KG/M2 | HEART RATE: 98 BPM | SYSTOLIC BLOOD PRESSURE: 132 MMHG | DIASTOLIC BLOOD PRESSURE: 72 MMHG | WEIGHT: 224.38 LBS

## 2022-09-15 DIAGNOSIS — G43.009 MIGRAINE WITHOUT AURA AND WITHOUT STATUS MIGRAINOSUS, NOT INTRACTABLE: Primary | ICD-10-CM

## 2022-09-15 DIAGNOSIS — G43.101 MIGRAINE WITH AURA AND WITH STATUS MIGRAINOSUS, NOT INTRACTABLE: ICD-10-CM

## 2022-09-15 PROCEDURE — 3075F SYST BP GE 130 - 139MM HG: CPT | Performed by: OTHER

## 2022-09-15 PROCEDURE — 3078F DIAST BP <80 MM HG: CPT | Performed by: OTHER

## 2022-09-15 PROCEDURE — 99213 OFFICE O/P EST LOW 20 MIN: CPT | Performed by: OTHER

## 2022-09-15 RX ORDER — SUMATRIPTAN 100 MG/1
TABLET, FILM COATED ORAL
Qty: 9 TABLET | Refills: 5 | Status: SHIPPED | OUTPATIENT
Start: 2022-09-15

## 2022-10-03 ENCOUNTER — OFFICE VISIT (OUTPATIENT)
Dept: FAMILY MEDICINE CLINIC | Facility: CLINIC | Age: 62
End: 2022-10-03
Payer: COMMERCIAL

## 2022-10-03 VITALS
HEART RATE: 77 BPM | OXYGEN SATURATION: 97 % | TEMPERATURE: 97 F | HEIGHT: 73 IN | BODY MASS INDEX: 29.24 KG/M2 | WEIGHT: 220.63 LBS

## 2022-10-03 DIAGNOSIS — Z00.00 ROUTINE HEALTH MAINTENANCE: ICD-10-CM

## 2022-10-03 DIAGNOSIS — M48.061 SPINAL STENOSIS OF LUMBAR REGION WITHOUT NEUROGENIC CLAUDICATION: Primary | ICD-10-CM

## 2022-10-03 DIAGNOSIS — M47.816 LUMBAR SPONDYLOSIS: ICD-10-CM

## 2022-10-03 DIAGNOSIS — M54.16 LUMBAR RADICULOPATHY: ICD-10-CM

## 2022-10-03 PROBLEM — E78.2 HYPERLIPIDEMIA, MIXED: Status: ACTIVE | Noted: 2022-09-19

## 2022-10-03 PROBLEM — R93.1 AGATSTON CORONARY ARTERY CALCIUM SCORE BETWEEN 100 AND 400: Status: ACTIVE | Noted: 2022-04-05

## 2022-10-03 PROBLEM — I77.810 DILATION OF THORACIC AORTA: Status: ACTIVE | Noted: 2022-04-05

## 2022-10-03 PROBLEM — I77.810 DILATION OF THORACIC AORTA (HCC): Status: ACTIVE | Noted: 2022-04-05

## 2022-10-03 PROBLEM — I48.0 PAROXYSMAL ATRIAL FIBRILLATION (HCC): Status: ACTIVE | Noted: 2022-09-19

## 2022-10-12 DIAGNOSIS — Q64.4 URACHAL CYST: ICD-10-CM

## 2022-10-12 DIAGNOSIS — G43.009 MIGRAINE WITHOUT AURA AND WITHOUT STATUS MIGRAINOSUS, NOT INTRACTABLE: ICD-10-CM

## 2022-10-12 DIAGNOSIS — R19.8 UMBILICAL BLEEDING: ICD-10-CM

## 2022-10-12 RX ORDER — HYDROCODONE BITARTRATE AND ACETAMINOPHEN 7.5; 325 MG/1; MG/1
TABLET ORAL
Qty: 180 TABLET | Refills: 0 | Status: SHIPPED | OUTPATIENT
Start: 2022-10-12

## 2022-10-12 NOTE — TELEPHONE ENCOUNTER
[facility] DRUG STORE [address] [facility], [facility] [address] AT [address] [facility] ([address]), [phone], [phone] 150 [facility]   Phone: [phone] Fax: [phone]     PATIENT REQUESTS REFILL ON [facility]

## 2022-10-12 NOTE — TELEPHONE ENCOUNTER
Routing to provider per protocol. HYDROcodone-acetaminophen 7.5-325 MG Oral Tab  Last refilled on 9/14/22 for #180  with 0 rf. Last labs 4/12/21. Last seen on 10/3/22. Future Appointments   Date Time Provider Shaggy Harmon   10/22/2022  8:15 AM [facility]LE[facility] NURSE KRYS Renae          Thank you.

## 2022-10-22 ENCOUNTER — NURSE ONLY (OUTPATIENT)
Dept: FAMILY MEDICINE CLINIC | Facility: CLINIC | Age: 62
End: 2022-10-22
Payer: COMMERCIAL

## 2022-10-22 DIAGNOSIS — Z00.00 ROUTINE HEALTH MAINTENANCE: ICD-10-CM

## 2022-10-22 LAB
ALBUMIN SERPL-MCNC: 3.7 G/DL (ref 3.4–5)
ALBUMIN/GLOB SERPL: 1.1 {RATIO} (ref 1–2)
ALP LIVER SERPL-CCNC: 54 U/L
ALT SERPL-CCNC: 39 U/L
ANION GAP SERPL CALC-SCNC: 6 MMOL/L (ref 0–18)
AST SERPL-CCNC: 21 U/L (ref 15–37)
BASOPHILS # BLD AUTO: 0.03 X10(3) UL (ref 0–0.2)
BASOPHILS NFR BLD AUTO: 0.5 %
BILIRUB SERPL-MCNC: 0.6 MG/DL (ref 0.1–2)
BUN BLD-MCNC: 20 MG/DL (ref 7–18)
CALCIUM BLD-MCNC: 9.4 MG/DL (ref 8.5–10.1)
CHLORIDE SERPL-SCNC: 108 MMOL/L (ref 98–112)
CHOLEST SERPL-MCNC: 265 MG/DL (ref ?–200)
CO2 SERPL-SCNC: 25 MMOL/L (ref 21–32)
COMPLEXED PSA SERPL-MCNC: 1.51 NG/ML (ref ?–4)
CREAT BLD-MCNC: 1.12 MG/DL
EOSINOPHIL # BLD AUTO: 0.08 X10(3) UL (ref 0–0.7)
EOSINOPHIL NFR BLD AUTO: 1.4 %
ERYTHROCYTE [DISTWIDTH] IN BLOOD BY AUTOMATED COUNT: 12.8 %
FASTING PATIENT LIPID ANSWER: YES
FASTING STATUS PATIENT QL REPORTED: YES
GFR SERPLBLD BASED ON 1.73 SQ M-ARVRAT: 75 ML/MIN/1.73M2 (ref 60–?)
GLOBULIN PLAS-MCNC: 3.5 G/DL (ref 2.8–4.4)
GLUCOSE BLD-MCNC: 108 MG/DL (ref 70–99)
HCT VFR BLD AUTO: 47.2 %
HDLC SERPL-MCNC: 52 MG/DL (ref 40–59)
HGB BLD-MCNC: 16.2 G/DL
IMM GRANULOCYTES # BLD AUTO: 0.02 X10(3) UL (ref 0–1)
IMM GRANULOCYTES NFR BLD: 0.3 %
LDLC SERPL CALC-MCNC: 202 MG/DL (ref ?–100)
LYMPHOCYTES # BLD AUTO: 1.99 X10(3) UL (ref 1–4)
LYMPHOCYTES NFR BLD AUTO: 34.3 %
MCH RBC QN AUTO: 32.6 PG (ref 26–34)
MCHC RBC AUTO-ENTMCNC: 34.3 G/DL (ref 31–37)
MCV RBC AUTO: 95 FL
MONOCYTES # BLD AUTO: 0.59 X10(3) UL (ref 0.1–1)
MONOCYTES NFR BLD AUTO: 10.2 %
NEUTROPHILS # BLD AUTO: 3.1 X10 (3) UL (ref 1.5–7.7)
NEUTROPHILS # BLD AUTO: 3.1 X10(3) UL (ref 1.5–7.7)
NEUTROPHILS NFR BLD AUTO: 53.3 %
NONHDLC SERPL-MCNC: 213 MG/DL (ref ?–130)
OSMOLALITY SERPL CALC.SUM OF ELEC: 291 MOSM/KG (ref 275–295)
PLATELET # BLD AUTO: 188 10(3)UL (ref 150–450)
POTASSIUM SERPL-SCNC: 4 MMOL/L (ref 3.5–5.1)
PROT SERPL-MCNC: 7.2 G/DL (ref 6.4–8.2)
RBC # BLD AUTO: 4.97 X10(6)UL
SODIUM SERPL-SCNC: 139 MMOL/L (ref 136–145)
T4 FREE SERPL-MCNC: 1 NG/DL (ref 0.8–1.7)
TRIGL SERPL-MCNC: 70 MG/DL (ref 30–149)
TSI SER-ACNC: 1.48 MIU/ML (ref 0.36–3.74)
VLDLC SERPL CALC-MCNC: 15 MG/DL (ref 0–30)
WBC # BLD AUTO: 5.8 X10(3) UL (ref 4–11)

## 2022-10-22 PROCEDURE — 80053 COMPREHEN METABOLIC PANEL: CPT | Performed by: FAMILY MEDICINE

## 2022-10-22 PROCEDURE — 80061 LIPID PANEL: CPT | Performed by: FAMILY MEDICINE

## 2022-10-22 PROCEDURE — 84439 ASSAY OF FREE THYROXINE: CPT | Performed by: FAMILY MEDICINE

## 2022-10-22 PROCEDURE — 85025 COMPLETE CBC W/AUTO DIFF WBC: CPT | Performed by: FAMILY MEDICINE

## 2022-10-22 PROCEDURE — 84443 ASSAY THYROID STIM HORMONE: CPT | Performed by: FAMILY MEDICINE

## 2022-10-22 NOTE — PROGRESS NOTES
Pt here for fasting labs ordered by DS. 1 light green and 1 purple tube drawn from pts left arm using 1\" needle. Pt left in good condition.

## 2022-10-25 ENCOUNTER — TELEPHONE (OUTPATIENT)
Dept: FAMILY MEDICINE CLINIC | Facility: CLINIC | Age: 62
End: 2022-10-25

## 2022-10-25 NOTE — TELEPHONE ENCOUNTER
----- Message from Ardell MilkDO brianna sent at 10/25/2022  8:25 AM CDT -----  Roc Owens  labs overall looked very good, except his lipids were not great at all. Is he still taking the Rosuvastatin? Excellent kidney, liver function, blood sugar and thyroid were all normal.as was his PSA. I'd like hi, to make sure he's taking the crestor, because of his positive heart scan score.

## 2022-11-11 DIAGNOSIS — Q64.4 URACHAL CYST: ICD-10-CM

## 2022-11-11 DIAGNOSIS — G43.009 MIGRAINE WITHOUT AURA AND WITHOUT STATUS MIGRAINOSUS, NOT INTRACTABLE: ICD-10-CM

## 2022-11-11 DIAGNOSIS — R19.8 UMBILICAL BLEEDING: ICD-10-CM

## 2022-11-11 RX ORDER — CARISOPRODOL 350 MG/1
TABLET ORAL
Qty: 120 TABLET | Refills: 1 | Status: SHIPPED | OUTPATIENT
Start: 2022-11-11

## 2022-11-11 RX ORDER — HYDROCODONE BITARTRATE AND ACETAMINOPHEN 7.5; 325 MG/1; MG/1
TABLET ORAL
Qty: 180 TABLET | Refills: 0 | Status: SHIPPED | OUTPATIENT
Start: 2022-11-11

## 2022-11-11 NOTE — TELEPHONE ENCOUNTER
Patient requests refills    He is leaving for out of town so requests filled today, if possible    HYDROcodone-acetaminophen 7.5-325 MG Oral Tab    carisoprodol 350 MG Oral Tab    [facility]

## 2022-11-11 NOTE — TELEPHONE ENCOUNTER
Last OV 10/3/22  Last refilled:  10/12/22 hydrocodone #180  0 refill  8/11/22 carisoprodol  #120  1 refill

## 2022-11-28 RX ORDER — ROSUVASTATIN CALCIUM 10 MG/1
TABLET, COATED ORAL
Qty: 90 TABLET | Refills: 2 | Status: SHIPPED | OUTPATIENT
Start: 2022-11-28

## 2022-12-08 DIAGNOSIS — Q64.4 URACHAL CYST: ICD-10-CM

## 2022-12-08 DIAGNOSIS — G43.009 MIGRAINE WITHOUT AURA AND WITHOUT STATUS MIGRAINOSUS, NOT INTRACTABLE: ICD-10-CM

## 2022-12-08 DIAGNOSIS — R19.8 UMBILICAL BLEEDING: ICD-10-CM

## 2022-12-08 RX ORDER — HYDROCODONE BITARTRATE AND ACETAMINOPHEN 7.5; 325 MG/1; MG/1
TABLET ORAL
Qty: 180 TABLET | Refills: 0 | Status: SHIPPED | OUTPATIENT
Start: 2022-12-08

## 2022-12-08 NOTE — TELEPHONE ENCOUNTER
Pt needs a refill of:    HYDROcodone-acetaminophen 7.5-325 MG Oral Tab [[id number]] (Order [id number]    Please send to:    Rafael Manning [address] [facility], [address] AT [facility]0 [facility] ([address]), [phone], [phone]   [address] [place] [address]   Phone: [phone] Fax: [phone]     Thank you!

## 2022-12-08 NOTE — TELEPHONE ENCOUNTER
Routing to provider per protocol. HYDROcodone-acetaminophen 7.5-325 MG Oral Tab  Last refilled on 11/11/22 for #180  with 0 rf. Last labs 10/22/22. Last seen on 10/3/22. No future appointments. Thank you.

## 2023-01-05 DIAGNOSIS — R19.8 UMBILICAL BLEEDING: ICD-10-CM

## 2023-01-05 DIAGNOSIS — Q64.4 URACHAL CYST: ICD-10-CM

## 2023-01-05 DIAGNOSIS — G43.009 MIGRAINE WITHOUT AURA AND WITHOUT STATUS MIGRAINOSUS, NOT INTRACTABLE: ICD-10-CM

## 2023-01-05 RX ORDER — HYDROCODONE BITARTRATE AND ACETAMINOPHEN 7.5; 325 MG/1; MG/1
TABLET ORAL
Qty: 180 TABLET | Refills: 0 | Status: SHIPPED | OUTPATIENT
Start: 2023-01-05

## 2023-01-05 NOTE — TELEPHONE ENCOUNTER
PT CALLED AND ADV HAS TO LEAVE OUT OF STATE FOR A FEW DAYS.  WOULD LIKE TO KNOW IF PT CAN GET EARLY REFILL OF :    HYDROcodone-acetaminophen 7.5-325 MG Oral Tab    PLEASE SEND TO [facility] PAUL       THANK YOU

## 2023-02-03 DIAGNOSIS — G43.009 MIGRAINE WITHOUT AURA AND WITHOUT STATUS MIGRAINOSUS, NOT INTRACTABLE: ICD-10-CM

## 2023-02-03 DIAGNOSIS — Q64.4 URACHAL CYST: ICD-10-CM

## 2023-02-03 DIAGNOSIS — R19.8 UMBILICAL BLEEDING: ICD-10-CM

## 2023-02-03 NOTE — TELEPHONE ENCOUNTER
Last OV 10/3/22  Last refilled:  1/5/23 hydrocodone #180  0 refill  11/11/22 carisoprodol #120  1 refill

## 2023-02-03 NOTE — TELEPHONE ENCOUNTER
[facility] DRUG STORE #[id number] - [place], [address] AT [facility]0 [facility] ([address]), [phone], [phone] 150 [facility]   Phone: [phone] Fax: [phone]     PATIENT REQUESTS REFILL ON   CARISOPRODOL AND HYDROCODONE

## 2023-02-04 RX ORDER — CARISOPRODOL 350 MG/1
TABLET ORAL
Qty: 120 TABLET | Refills: 1 | Status: SHIPPED | OUTPATIENT
Start: 2023-02-04

## 2023-02-04 RX ORDER — HYDROCODONE BITARTRATE AND ACETAMINOPHEN 7.5; 325 MG/1; MG/1
TABLET ORAL
Qty: 180 TABLET | Refills: 0 | Status: SHIPPED | OUTPATIENT
Start: 2023-02-04

## 2023-03-07 DIAGNOSIS — Q64.4 URACHAL CYST: ICD-10-CM

## 2023-03-07 DIAGNOSIS — R19.8 UMBILICAL BLEEDING: ICD-10-CM

## 2023-03-07 DIAGNOSIS — G43.009 MIGRAINE WITHOUT AURA AND WITHOUT STATUS MIGRAINOSUS, NOT INTRACTABLE: ICD-10-CM

## 2023-03-07 RX ORDER — HYDROCODONE BITARTRATE AND ACETAMINOPHEN 7.5; 325 MG/1; MG/1
TABLET ORAL
Qty: 180 TABLET | Refills: 0 | Status: SHIPPED | OUTPATIENT
Start: 2023-03-07

## 2023-03-07 NOTE — TELEPHONE ENCOUNTER
HYDROcodone-acetaminophen 7.5-325 MG Oral Tab      Pt would like sent to  David  [address] [facility],  Place Callum De Gabhavani [address] [place] [address] ([address]), [phone], [phone]

## 2023-03-07 NOTE — TELEPHONE ENCOUNTER
Routing to provider per protocol. HYDROcodone-acetaminophen 7.5-325 MG Oral Tab  Last refilled on 2/4/23 for #180  with 0 rf. Last labs 10/22/22. Last seen on 10/3/22. No future appointments. Thank you.

## 2023-04-07 DIAGNOSIS — G43.009 MIGRAINE WITHOUT AURA AND WITHOUT STATUS MIGRAINOSUS, NOT INTRACTABLE: ICD-10-CM

## 2023-04-07 DIAGNOSIS — R19.8 UMBILICAL BLEEDING: ICD-10-CM

## 2023-04-07 DIAGNOSIS — Q64.4 URACHAL CYST: ICD-10-CM

## 2023-04-07 NOTE — TELEPHONE ENCOUNTER
PT CALLED AND ADV WILL BE OUT OF MEDICATION THIS WEEKEND.  NEEDS REFILL OF     HYDROcodone-acetaminophen 7.5-325 MG Oral Tab    [facility]     THANK YOU

## 2023-04-08 RX ORDER — HYDROCODONE BITARTRATE AND ACETAMINOPHEN 7.5; 325 MG/1; MG/1
TABLET ORAL
Qty: 180 TABLET | Refills: 0 | Status: SHIPPED | OUTPATIENT
Start: 2023-04-08

## 2023-04-21 NOTE — TELEPHONE ENCOUNTER
LOV 10/03/22  Last labs 10/22/22  Last refill on 02/04/23, for #120 tabs, with 1 refills  carisoprodol 350 MG Oral Tab    No future appointments. Order(s) pending, please review. Thank you.   [facility] [employer]

## 2023-04-21 NOTE — TELEPHONE ENCOUNTER
[facility] DRUG STORE #[id number] - [place], [address] AT [address] [facility] ([address]), [phone], [phone] 150 [facility]   Phone: [phone] Fax: [phone]     PATIENT ASKING FOR REFILL ON CARISOPRODOL 350

## 2023-04-22 RX ORDER — CARISOPRODOL 350 MG/1
TABLET ORAL
Qty: 120 TABLET | Refills: 1 | Status: SHIPPED | OUTPATIENT
Start: 2023-04-22

## 2023-05-08 DIAGNOSIS — R19.8 UMBILICAL BLEEDING: ICD-10-CM

## 2023-05-08 DIAGNOSIS — G43.009 MIGRAINE WITHOUT AURA AND WITHOUT STATUS MIGRAINOSUS, NOT INTRACTABLE: ICD-10-CM

## 2023-05-08 DIAGNOSIS — Q64.4 URACHAL CYST: ICD-10-CM

## 2023-05-08 RX ORDER — HYDROCODONE BITARTRATE AND ACETAMINOPHEN 7.5; 325 MG/1; MG/1
TABLET ORAL
Qty: 180 TABLET | Refills: 0 | Status: SHIPPED | OUTPATIENT
Start: 2023-05-08

## 2023-05-08 NOTE — TELEPHONE ENCOUNTER
PT CALLED AND ADV NEEDS REFILLS OF     HYDROcodone-acetaminophen 7.5-325 MG Oral Tab    PLEASE SEND TO JUSTINA PAUL     THANK YOU

## 2023-05-08 NOTE — ED INITIAL ASSESSMENT (HPI)
Ambulatory Care Coordination Note  2023    Patient Current Location:  [place]     LPN CC contacted the patient by telephone. Verified name and  with patient as identifiers. Provided introduction to self, and explanation of the LPN CC role. Challenges to be reviewed by the provider   Additional needs identified to be addressed with provider: No  none               Method of communication with provider: none. ACM: Neisha Conley LPN    Spoke with patient. Patient states she is doing good. Patient states she requested a refill through the pharmacy but hasn't recieved it. Writer will send a refill request.to pcp. Care Coordination Interventions    Referral from Primary Care Provider: No  Suggested Interventions and Community Resources  No Identified Needs          Goals Addressed                   This Visit's Progress     Conditions and Symptoms   On track     I will schedule office visits, as directed by my provider. I will keep my appointment or reschedule if I have to cancel. I will notify my provider of any barriers to my plan of care. I will notify my provider of any symptoms that indicate a worsening of my condition. Barriers: none  Plan for overcoming my barriers: N/A  Confidence: 10/10  Anticipated Goal Completion Date:          Medication Management   On track     I will take my medication as directed. I will notify my provider of any problems with medications, like adverse effects or side effects. I will notify my provider/Care Coordinator if I am unable to afford my medications. I will notify my provider for advice before I stop taking any of my medication. Barriers: none  Plan for overcoming my barriers: N/A  Confidence: 10/10  Anticipated Goal Completion Date:        Patient verbalizes understanding of self -management goals of living with Diabetes.    On track     Check blood sugars  Eat a healthy diet              Future Appointments   Date Time Provider Pt sts this evening at 6pm fishing line got stuck in a tree. Pt yanked the line very hard and the hook lodged in the left side of forehead. C/o HA, dizziness. Tetanus 2/2015.

## 2023-05-08 NOTE — TELEPHONE ENCOUNTER
LOV 10/03/22  Last refill on 04/08/23, for #180 tabs, with 0 refills  HYDROcodone-acetaminophen 7.5-325 MG Oral Tab    No future appointments. Order(s) pending, please review. Thank you.   [facility] [employer]

## 2023-05-30 RX ORDER — CARISOPRODOL 350 MG/1
TABLET ORAL
Qty: 120 TABLET | Refills: 1 | Status: SHIPPED | OUTPATIENT
Start: 2023-05-30

## 2023-05-30 NOTE — TELEPHONE ENCOUNTER
LOV: 10/3/22   Last Refill: 4/22/23 120 1 RF    Future Appointments   Date Time Provider Shaggy Harmon   6/10/2023  9:30 AM Bart Evans [facility] SELWYN Denny

## 2023-05-30 NOTE — TELEPHONE ENCOUNTER
Pt needs a refill of:    carisoprodol 350 MG Oral Tab [[id number]] (Order [id number])    Please send to:  Guadalupe Pachecouty [address] [facility], [address] AT [address] [facility] ([address]), [phone], [phone]   [address] [place] [address]   Phone: [phone] Fax: [phone]       Thank you!

## 2023-06-06 DIAGNOSIS — Q64.4 URACHAL CYST: ICD-10-CM

## 2023-06-06 DIAGNOSIS — R19.8 UMBILICAL BLEEDING: ICD-10-CM

## 2023-06-06 DIAGNOSIS — G43.009 MIGRAINE WITHOUT AURA AND WITHOUT STATUS MIGRAINOSUS, NOT INTRACTABLE: ICD-10-CM

## 2023-06-06 RX ORDER — HYDROCODONE BITARTRATE AND ACETAMINOPHEN 7.5; 325 MG/1; MG/1
TABLET ORAL
Qty: 180 TABLET | Refills: 0 | Status: SHIPPED | OUTPATIENT
Start: 2023-06-06

## 2023-06-06 NOTE — TELEPHONE ENCOUNTER
[facility] DRUG STORE [address] Callum Kramerbhavani [address] [place] [address] ([address]), [phone], [phone] [address]   Phone: [phone] Fax: [phone]   Hours: Not open 24 hours     PATIENT REQUESTING REFILL FOR HYDROCODONE 7.5-325MG

## 2023-06-10 ENCOUNTER — OFFICE VISIT (OUTPATIENT)
Dept: FAMILY MEDICINE CLINIC | Facility: CLINIC | Age: 63
End: 2023-06-10
Payer: COMMERCIAL

## 2023-06-10 VITALS
DIASTOLIC BLOOD PRESSURE: 100 MMHG | HEIGHT: 73 IN | HEART RATE: 81 BPM | OXYGEN SATURATION: 98 % | TEMPERATURE: 99 F | SYSTOLIC BLOOD PRESSURE: 200 MMHG | WEIGHT: 241 LBS | RESPIRATION RATE: 16 BRPM | BODY MASS INDEX: 31.94 KG/M2

## 2023-06-10 DIAGNOSIS — M79.672 FOOT PAIN, BILATERAL: ICD-10-CM

## 2023-06-10 DIAGNOSIS — R20.2 PARESTHESIA OF BOTH FEET: Primary | ICD-10-CM

## 2023-06-10 DIAGNOSIS — M79.671 FOOT PAIN, BILATERAL: ICD-10-CM

## 2023-06-10 DIAGNOSIS — M48.061 SPINAL STENOSIS OF LUMBAR REGION WITHOUT NEUROGENIC CLAUDICATION: ICD-10-CM

## 2023-06-10 PROCEDURE — 3077F SYST BP >= 140 MM HG: CPT | Performed by: FAMILY MEDICINE

## 2023-06-10 PROCEDURE — 99214 OFFICE O/P EST MOD 30 MIN: CPT | Performed by: FAMILY MEDICINE

## 2023-06-10 PROCEDURE — 3080F DIAST BP >= 90 MM HG: CPT | Performed by: FAMILY MEDICINE

## 2023-06-10 PROCEDURE — 3008F BODY MASS INDEX DOCD: CPT | Performed by: FAMILY MEDICINE

## 2023-06-10 RX ORDER — LISINOPRIL 10 MG/1
10 TABLET ORAL DAILY
Qty: 30 TABLET | Refills: 2 | Status: SHIPPED | OUTPATIENT
Start: 2023-06-10 | End: 2023-07-10

## 2023-07-01 DIAGNOSIS — Q64.4 URACHAL CYST: ICD-10-CM

## 2023-07-01 DIAGNOSIS — R19.8 UMBILICAL BLEEDING: ICD-10-CM

## 2023-07-01 DIAGNOSIS — G43.009 MIGRAINE WITHOUT AURA AND WITHOUT STATUS MIGRAINOSUS, NOT INTRACTABLE: ICD-10-CM

## 2023-07-01 RX ORDER — CARISOPRODOL 350 MG/1
TABLET ORAL
Qty: 120 TABLET | Refills: 1 | Status: SHIPPED | OUTPATIENT
Start: 2023-07-01

## 2023-07-01 RX ORDER — HYDROCODONE BITARTRATE AND ACETAMINOPHEN 7.5; 325 MG/1; MG/1
TABLET ORAL
Qty: 180 TABLET | Refills: 0 | Status: SHIPPED | OUTPATIENT
Start: 2023-07-01

## 2023-08-02 DIAGNOSIS — R19.8 UMBILICAL BLEEDING: ICD-10-CM

## 2023-08-02 DIAGNOSIS — Q64.4 URACHAL CYST: ICD-10-CM

## 2023-08-02 DIAGNOSIS — G43.009 MIGRAINE WITHOUT AURA AND WITHOUT STATUS MIGRAINOSUS, NOT INTRACTABLE: ICD-10-CM

## 2023-08-02 RX ORDER — HYDROCODONE BITARTRATE AND ACETAMINOPHEN 7.5; 325 MG/1; MG/1
TABLET ORAL
Qty: 180 TABLET | Refills: 0 | Status: SHIPPED | OUTPATIENT
Start: 2023-08-02

## 2023-08-02 NOTE — TELEPHONE ENCOUNTER
Pt requesting refill on HYDROcodone-acetaminophen 7.5-325 MG Oral Tab     Asking to be sent to [facility].      LOV: 6/10/23

## 2023-08-12 RX ORDER — LISINOPRIL 10 MG/1
10 TABLET ORAL DAILY
Qty: 90 TABLET | Refills: 2 | Status: SHIPPED | OUTPATIENT
Start: 2023-08-12

## 2023-08-12 NOTE — TELEPHONE ENCOUNTER
Hypertension Medications Protocol [id number]/12/2023 08:01 AM   Protocol Details CMP or BMP in past 12 months    Last serum creatinine< 2.0    Appointment in past 6 or next 3 months      BP Readings from Last 3 Encounters:  06/10/23 : (!) 200/100  09/15/22 : 132/72  08/12/21 : (!) 146/98    Last OV:06/10/2023  Last refill:06/10/2023, 30 tabs, 2 refill   Didn't refill due to reading of BP  Future Appointments   Date Time Provider Shaggy Harmon   10/26/2023 11:00 AM DO MELANIE CollinsIWMARIA ISABEL [id number]       Medication pended, please sign if appropriate

## 2023-09-01 DIAGNOSIS — G43.009 MIGRAINE WITHOUT AURA AND WITHOUT STATUS MIGRAINOSUS, NOT INTRACTABLE: ICD-10-CM

## 2023-09-01 DIAGNOSIS — Q64.4 URACHAL CYST: ICD-10-CM

## 2023-09-01 DIAGNOSIS — R19.8 UMBILICAL BLEEDING: ICD-10-CM

## 2023-09-03 RX ORDER — HYDROCODONE BITARTRATE AND ACETAMINOPHEN 7.5; 325 MG/1; MG/1
TABLET ORAL
Qty: 180 TABLET | Refills: 0 | Status: SHIPPED | OUTPATIENT
Start: 2023-09-03

## 2023-09-22 NOTE — TELEPHONE ENCOUNTER
Last OV:06/10/2023  Last refill:07/01/2023, 120 tabs, 1 refill     Medication pended, please sign if appropriate

## 2023-09-25 RX ORDER — CARISOPRODOL 350 MG/1
TABLET ORAL
Qty: 120 TABLET | Refills: 1 | Status: SHIPPED | OUTPATIENT
Start: 2023-09-25

## 2023-09-29 DIAGNOSIS — G43.009 MIGRAINE WITHOUT AURA AND WITHOUT STATUS MIGRAINOSUS, NOT INTRACTABLE: ICD-10-CM

## 2023-09-29 DIAGNOSIS — Q64.4 URACHAL CYST: ICD-10-CM

## 2023-09-29 DIAGNOSIS — R19.8 UMBILICAL BLEEDING: ICD-10-CM

## 2023-09-29 NOTE — TELEPHONE ENCOUNTER
Patient requests refill    HYDROcodone-acetaminophen 7.5-325 MG Oral Tab     His is ok to wait until Dr. Shelly Ford return to fill    Thank you

## 2023-09-29 NOTE — TELEPHONE ENCOUNTER
LOV 06/10/23  Last refill on 09/03/23, for #180 tabs, with 0 refills  HYDROcodone-acetaminophen 7.5-325 MG Oral Tab     Future Appointments   Date Time Provider Shaggy Harmon   10/26/2023 11:00 AM DO FRANCISCO Higgins [id number]     Order(s) pending, please review. Thank you.

## 2023-09-30 RX ORDER — HYDROCODONE BITARTRATE AND ACETAMINOPHEN 7.5; 325 MG/1; MG/1
TABLET ORAL
Qty: 180 TABLET | Refills: 0 | Status: SHIPPED | OUTPATIENT
Start: 2023-09-30

## 2023-10-26 ENCOUNTER — PROCEDURE VISIT (OUTPATIENT)
Dept: NEUROLOGY | Facility: CLINIC | Age: 63
End: 2023-10-26

## 2023-10-26 DIAGNOSIS — M54.16 LUMBAR RADICULOPATHY: Primary | ICD-10-CM

## 2023-10-31 DIAGNOSIS — G43.009 MIGRAINE WITHOUT AURA AND WITHOUT STATUS MIGRAINOSUS, NOT INTRACTABLE: ICD-10-CM

## 2023-10-31 DIAGNOSIS — Q64.4 URACHAL CYST: ICD-10-CM

## 2023-10-31 DIAGNOSIS — R19.8 UMBILICAL BLEEDING: ICD-10-CM

## 2023-10-31 RX ORDER — HYDROCODONE BITARTRATE AND ACETAMINOPHEN 7.5; 325 MG/1; MG/1
TABLET ORAL
Qty: 180 TABLET | Refills: 0 | Status: SHIPPED | OUTPATIENT
Start: 2023-10-31

## 2023-10-31 NOTE — TELEPHONE ENCOUNTER
PT CALLED AND ADV NEEDS REFILL OF     HYDROcodone-acetaminophen 7.5-325 MG Oral Tab     PLEASE SEND TO [facility] PAUL       THANK YOU

## 2023-12-01 DIAGNOSIS — G43.009 MIGRAINE WITHOUT AURA AND WITHOUT STATUS MIGRAINOSUS, NOT INTRACTABLE: ICD-10-CM

## 2023-12-01 DIAGNOSIS — R19.8 UMBILICAL BLEEDING: ICD-10-CM

## 2023-12-01 DIAGNOSIS — Q64.4 URACHAL CYST: ICD-10-CM

## 2023-12-01 NOTE — TELEPHONE ENCOUNTER
Patient requests refill    HYDROcodone-acetaminophen 7.5-325 MG Oral Tab     He will be all out after tonight      [facility]    Please adv  Thank you

## 2023-12-02 RX ORDER — HYDROCODONE BITARTRATE AND ACETAMINOPHEN 7.5; 325 MG/1; MG/1
TABLET ORAL
Qty: 180 TABLET | Refills: 0 | Status: SHIPPED | OUTPATIENT
Start: 2023-12-02

## 2023-12-11 RX ORDER — CARISOPRODOL 350 MG/1
TABLET ORAL
Qty: 120 TABLET | Refills: 1 | Status: SHIPPED | OUTPATIENT
Start: 2023-12-11

## 2023-12-11 NOTE — TELEPHONE ENCOUNTER
PT CALLED AND ADV NEEDS REFILL OF     carisoprodol 350 MG Oral Tab     PLEASE SEND TO CLAUDE PAUL       THANK YOU

## 2023-12-20 NOTE — MR AVS SNAPSHOT
[address] [facility] [id number] [phone]               Thank you for choosing us for your health care visit with [facility]   We are glad to serve you and happy to provide you with this Spoke with daughter,Zulma, and she confirmed that MR. Medrano can do the cardioversion with Brooke and Dr. Dutta on 1/2/2024.  He will arrive at 11:30 for a 1pm procedure.  She understands he must take his medications with a sip of water the morning of procedure, however nothing to eat/drink 8 hours prior to the cardioversion.  He must have a .  [facility] will call you on the Friday before to go over H&P.  I will call Mr. Medrano and have him come to office on Friday as well to get an EKG.  She verbalized understanding.    Drug induced hepatitis    Procaine Other (See Comments)                   Current Medications          This list is accurate as of: 4/29/17  8:07 AM.  Always use your most recent med list.                butalbital-aspirin-caffeine -40 MG Caps   AISSATOU Call [phone] for help. Sonogenixt is NOT to be used for urgent needs. For medical emergencies, dial 911.            Visit [facility] online at  [employer]

## 2023-12-21 DIAGNOSIS — Q64.4 URACHAL CYST: ICD-10-CM

## 2023-12-21 DIAGNOSIS — R19.8 UMBILICAL BLEEDING: ICD-10-CM

## 2023-12-21 DIAGNOSIS — G43.009 MIGRAINE WITHOUT AURA AND WITHOUT STATUS MIGRAINOSUS, NOT INTRACTABLE: ICD-10-CM

## 2023-12-21 RX ORDER — HYDROCODONE BITARTRATE AND ACETAMINOPHEN 7.5; 325 MG/1; MG/1
TABLET ORAL
Qty: 180 TABLET | Refills: 0 | Status: SHIPPED | OUTPATIENT
Start: 2023-12-21

## 2023-12-21 NOTE — TELEPHONE ENCOUNTER
Pt requesting refill for his HYDROcodone-acetaminophen 7.5-325 MG Oral Tab earliest fill end of next week. Pt does not need the medication now, but wants to have the rx at the pharm so he doesn't bother PCP.      [facility] ok[facility] [place]

## 2024-01-29 DIAGNOSIS — G43.009 MIGRAINE WITHOUT AURA AND WITHOUT STATUS MIGRAINOSUS, NOT INTRACTABLE: ICD-10-CM

## 2024-01-29 DIAGNOSIS — R19.8 UMBILICAL BLEEDING: ICD-10-CM

## 2024-01-29 DIAGNOSIS — Q64.4 URACHAL CYST: ICD-10-CM

## 2024-01-29 RX ORDER — HYDROCODONE BITARTRATE AND ACETAMINOPHEN 7.5; 325 MG/1; MG/1
TABLET ORAL
Qty: 180 TABLET | Refills: 0 | Status: SHIPPED | OUTPATIENT
Start: 2024-01-29

## 2024-02-26 ENCOUNTER — TELEPHONE (OUTPATIENT)
Dept: FAMILY MEDICINE CLINIC | Facility: CLINIC | Age: 64
End: 2024-02-26

## 2024-02-26 DIAGNOSIS — R19.8 UMBILICAL BLEEDING: ICD-10-CM

## 2024-02-26 DIAGNOSIS — G43.009 MIGRAINE WITHOUT AURA AND WITHOUT STATUS MIGRAINOSUS, NOT INTRACTABLE: ICD-10-CM

## 2024-02-26 DIAGNOSIS — M47.816 LUMBAR SPONDYLOSIS: Primary | ICD-10-CM

## 2024-02-26 DIAGNOSIS — Q64.4 URACHAL CYST: ICD-10-CM

## 2024-02-26 DIAGNOSIS — M54.16 LUMBAR RADICULOPATHY: ICD-10-CM

## 2024-02-26 RX ORDER — HYDROCODONE BITARTRATE AND ACETAMINOPHEN 7.5; 325 MG/1; MG/1
TABLET ORAL
Qty: 180 TABLET | Refills: 0 | Status: SHIPPED | OUTPATIENT
Start: 2024-02-26

## 2024-02-26 RX ORDER — CARISOPRODOL 350 MG/1
TABLET ORAL
Qty: 120 TABLET | Refills: 1 | Status: SHIPPED | OUTPATIENT
Start: 2024-02-26

## 2024-02-26 NOTE — TELEPHONE ENCOUNTER
Pt needs the following refilled:    HYDROcodone-acetaminophen 7.5-325 MG Oral Tab [[id number]] (Order [id number]    AND    carisoprodol 350 MG Oral Tab [[id number]] (Order [id number])   Please send to:  [facility] #[id number] - [place], [place] - [address] [facility] AT [facility] & [facility] ([address]), [phone], [phone]   [address] [facility] [address]   Phone: [phone] Fax: [phone]   Hours: Not open 24 hours   Thank you!

## 2024-03-05 RX ORDER — LISINOPRIL 10 MG/1
10 TABLET ORAL DAILY
Qty: 90 TABLET | Refills: 2 | Status: SHIPPED | OUTPATIENT
Start: 2024-03-05

## 2024-03-05 NOTE — TELEPHONE ENCOUNTER
Pt failed refill protocol for the following reasons:    Name from pharmacy: LISINOPRIL 10MG TABLETS          Will file in chart as: LISINOPRIL 10 MG Oral Tab    Sig: TAKE 1 TABLET(10 MG) BY MOUTH DAILY    Disp: 90 tablet    Refills: 2 (Pharmacy requested: Not specified)    Start: 3/5/2024    Class: Normal    Non-formulary    Last ordered: 6 months ago (8/12/2023) by Hossein Evans, DO    Last refill: 12/11/2023    Rx #: [id number]    Hypertension Medications Protocol Jcrjgy0803/05/2024 01:22 PM   Protocol Details CMP or BMP in past 12 months    Last BP reading less than 140/90    EGFRCR or GFRNAA > 50    In person appointment or virtual visit in the past 12 mos or appointment in next 3 mos      To be filled at: [facility] #[id number] - [place], [place] - [address] [facility] AT [facility] ([address]), [phone], [phone]       Last refill: 8/12/23  Last appt: 6/10/23  Next appt: No future appointments.      Forward to Dr. Evans, please advise on refills. Thank you.

## 2024-03-13 ENCOUNTER — PATIENT MESSAGE (OUTPATIENT)
Dept: FAMILY MEDICINE CLINIC | Facility: CLINIC | Age: 64
End: 2024-03-13

## 2024-03-13 ENCOUNTER — OFFICE VISIT (OUTPATIENT)
Dept: FAMILY MEDICINE CLINIC | Facility: CLINIC | Age: 64
End: 2024-03-13
Payer: COMMERCIAL

## 2024-03-13 VITALS
HEART RATE: 93 BPM | WEIGHT: 227 LBS | TEMPERATURE: 98 F | SYSTOLIC BLOOD PRESSURE: 126 MMHG | BODY MASS INDEX: 30.09 KG/M2 | DIASTOLIC BLOOD PRESSURE: 82 MMHG | OXYGEN SATURATION: 96 % | HEIGHT: 73 IN

## 2024-03-13 DIAGNOSIS — R06.01 ORTHOPNEA: ICD-10-CM

## 2024-03-13 DIAGNOSIS — Z86.79 HISTORY OF ATRIAL FIBRILLATION: ICD-10-CM

## 2024-03-13 DIAGNOSIS — Z00.00 ROUTINE HEALTH MAINTENANCE: ICD-10-CM

## 2024-03-13 DIAGNOSIS — R06.81 APNEA: Primary | ICD-10-CM

## 2024-03-13 PROCEDURE — 99396 PREV VISIT EST AGE 40-64: CPT | Performed by: FAMILY MEDICINE

## 2024-03-13 RX ORDER — MULTIVIT WITH MINERALS/LUTEIN
4000 TABLET ORAL DAILY
COMMUNITY

## 2024-03-13 RX ORDER — ZINC SULFATE 50(220)MG
880 CAPSULE ORAL DAILY
COMMUNITY

## 2024-03-13 RX ORDER — MV-MN/C/THEANINE/HERB NO.310 1000-200MG
POWDER IN PACKET (EA) ORAL
COMMUNITY

## 2024-03-13 NOTE — PROGRESS NOTES
Emile Hung is a 64 year old male.  HPI:   Emile is here  for discussion of SOB after he woke up to go to the bathroom. He felt like he ws not able to get enough air in, but when he sat up he felt better.  He them tried to go back to bed and felt it again. He kept leaning back and everytime he did he became SOB. He even tried to sleep upright, and again felt like he was SOB and could not breathe, he had a pulse ox and it was above 95%  . He did check his BP and was elevated 200/ 100's, he took his BP meds and meds for  atrial fibrillation, and also has sleep apnea, according to his wife.  He can never tell when he is in fibrillation, and has not seen cardiology from 3 years now  Current Outpatient Medications   Medication Sig Dispense Refill    B Complex-C-Folic Acid Oral Tab Take by mouth.      Ascorbic Acid (VITAMIN C) 1000 MG Oral Tab Take 4 tablets (4,000 mg total) by mouth daily.      zinc sulfate 220 (50 Zn) MG Oral Cap Take 4 capsules (880 mg total) by mouth daily.      GINSENG KOREAN OR Take by mouth.      Ashwagandha 300 MG Oral Tab Take by mouth.      Misc Natural Products (NEURIVA) Oral Cap Take by mouth.      lisinopril 10 MG Oral Tab Take 1 tablet (10 mg total) by mouth daily. 90 tablet 2    HYDROcodone-acetaminophen 7.5-325 MG Oral Tab 1-2 po qid prn pain 180 tablet 0    carisoprodol 350 MG Oral Tab Take 1 tablet by mouth 3  times daily as needed for muscle spasms 120 tablet 1    Metoprolol Succinate ER 25 MG Oral Tablet 24 Hr       Omega-3 Fatty Acids (FISH OIL OR) Take 2 capsules by mouth daily.      aspirin 81 MG Oral Tab Take 1 tablet (81 mg total) by mouth daily.      MAGNESIUM OR Take 4 tablets by mouth every evening.      ROSUVASTATIN 10 MG Oral Tab TAKE 1 TABLET(10 MG) BY MOUTH EVERY EVENING (Patient not taking: Reported on 3/13/2024) 90 tablet 2      Past Medical History:   Diagnosis Date    Anesthesia complication     wakes up combative, violent according to patient    Attention deficit  hyperactivity disorder (ADHD)     NO MEDS    Back problem     Depression     AFTER THE BACK SURGERY WITH POOR RESULTS    Diverticulosis of colon (without mention of hemorrhage)     High cholesterol     Hyperlipidemia     Migraines     Neuropathy     left side occurred after surgery    Osteoarthritis     SPINE, KNEES    OTHER DISEASES     hepatitis drug induced    Visual impairment     glasses      Social History:  Social History     Socioeconomic History    Marital status:     Number of children: 2   Tobacco Use    Smoking status: Former     Packs/day: 1.00     Years: 5.00     Additional pack years: 0.00     Total pack years: 5.00     Types: Cigarettes     Quit date: 3/24/2005     Years since quittin.9    Smokeless tobacco: Former    Tobacco comments:     QUIT   Vaping Use    Vaping Use: Never used   Substance and Sexual Activity    Alcohol use: No     Alcohol/week: 0.0 standard drinks of alcohol     Comment: rare    Drug use: No   Other Topics Concern    Caffeine Concern Yes     Comment: coffee/soda    Exercise Yes     Comment: walk     Seat Belt No    Special Diet No    Stress Concern No    Weight Concern No        REVIEW OF SYSTEMS:   GENERAL HEALTH: feels fine bnow'  HEENT: snoring? Sinus congestion  SKIN: denies any unusual skin lesions or rashes  RESPIRATORY: denies shortness of breath with exertion, but SOB when supine  CARDIOVASCULAR: denies chest pain on exertion  GI: denies abdominal pain and denies heartburn  NEURO: denies headaches  EXT: deniee LE edema, has motor paralysis of the RLE due to previous surgery   EXAM:   /82 (BP Location: Left arm, Patient Position: Sitting, Cuff Size: adult)   Pulse 93   Temp 97.5 °F (36.4 °C)   Ht 6' 1\" (1.854 m)   Wt 227 lb (103 kg)   SpO2 96%   BMI 29.95 kg/m²   GENERAL: well developed, well nourished,in no apparent distress  SKIN: no rashes,no suspicious lesions  HEENT: atraumatic, normocephalic,ears and throat are clear  NECK: supple,no  adenopathy,no bruits  LUNGS: clear to auscultation  CARDIO: RRR with CLAUDIA over the aortic post  GI: good BS's,no masses, HSM or tenderness  EXTREMITIES: no cyanosis, clubbing or edema    ASSESSMENT AND PLAN:     Encounter Diagnoses   Name Primary?    Routine health maintenance     Apnea Yes    History of atrial fibrillation     Orthopnea        Orders Placed This Encounter   Procedures    CBC W Differential W Platelet [E]    Comp Metabolic Panel (14) [E]    Lipid Panel [E]    TSH and Free T4 [E]    PSA, Total (Screening) [E]       Meds & Refills for this Visit:  Requested Prescriptions      No prescriptions requested or ordered in this encounter       Imaging & Consults:  OP REFERRAL TO DIAGNOSTIC SLEEP STUDY  CARDIO - INTERNAL  XR CHEST PA + LAT CHEST ([id number])     The patient indicates understanding of these issues and agrees to the plan.  The patient is asked to return in FTER WE GET LABS ALSO WOULD LIKE HIM TO GET  A CXR.

## 2024-03-13 NOTE — TELEPHONE ENCOUNTER
RN Spoke to pt to triage call    Pt states SOB was in the middle of the night and has since resolved    HE has not noticed anything different- except for the sinus congestion that he has had for 3 weeks    About 130 this morning he got up to use the bathroom and when he went to lay back down he felt like he couldn't get enough air    Pt states around 3am he took lisinopril and metoprolol.     Pt states bp has resolved- he is feeling well, besides some congestion. No shortness of breath    RN scheduled for this after noon to be seen    Was advised to go to ER if he starts to have new or worsening symptoms- pt verbalized understanding

## 2024-03-13 NOTE — TELEPHONE ENCOUNTER
From: Emile Hung  To: Hossein Evans  Sent: 3/13/2024 8:49 AM CDT  Subject: Shortness of breath    Dr. LYN woke up at 1:30 this morning, when I went back to bed as soon as I lay down I was feeling short of breath and had to sit up. This repeated several times. I took my blood 02 several times and it was between 96 and 98 , I took my BP and it was very high so I took my BP meds. After an hour my BP was coming down but still high.  Here is my readings.   1:57 am. 186/107  2:40 am. 206/116  Take meds  4:20 am. 169/112  My pulse was always mid 50s  I feel fine, never had any pain, never dizzy.   I've had a bad sinus issue going on 3 weeks now and it may be moving into my chest .  I didn't feel short of breath about 3 amm and   Since. Not sure if this could be my Afib as I've never felt that.    Sorry for the long message.     Thank you   Emile

## 2024-03-14 ENCOUNTER — PATIENT MESSAGE (OUTPATIENT)
Dept: FAMILY MEDICINE CLINIC | Facility: CLINIC | Age: 64
End: 2024-03-14

## 2024-03-16 ENCOUNTER — HOSPITAL ENCOUNTER (OUTPATIENT)
Dept: GENERAL RADIOLOGY | Age: 64
Discharge: HOME | End: 2024-03-16
Attending: FAMILY MEDICINE
Payer: COMMERCIAL

## 2024-03-16 ENCOUNTER — NURSE ONLY (OUTPATIENT)
Dept: FAMILY MEDICINE CLINIC | Facility: CLINIC | Age: 64
End: 2024-03-16
Payer: COMMERCIAL

## 2024-03-16 DIAGNOSIS — R06.01 ORTHOPNEA: ICD-10-CM

## 2024-03-16 DIAGNOSIS — R06.81 APNEA: ICD-10-CM

## 2024-03-16 DIAGNOSIS — Z00.00 ROUTINE HEALTH MAINTENANCE: ICD-10-CM

## 2024-03-16 LAB
ALBUMIN SERPL-MCNC: 3.6 G/DL (ref 3.4–5)
ALBUMIN/GLOB SERPL: 1.1 {RATIO} (ref 1–2)
ALP LIVER SERPL-CCNC: 79 U/L
ALT SERPL-CCNC: 48 U/L
ANION GAP SERPL CALC-SCNC: 2 MMOL/L (ref 0–18)
AST SERPL-CCNC: 27 U/L (ref 15–37)
BASOPHILS # BLD AUTO: 0.02 X10(3) UL (ref 0–0.2)
BASOPHILS NFR BLD AUTO: 0.3 %
BILIRUB SERPL-MCNC: 0.5 MG/DL (ref 0.1–2)
BUN BLD-MCNC: 21 MG/DL (ref 9–23)
CALCIUM BLD-MCNC: 9.4 MG/DL (ref 8.5–10.1)
CHLORIDE SERPL-SCNC: 107 MMOL/L (ref 98–112)
CHOLEST SERPL-MCNC: 250 MG/DL (ref ?–200)
CO2 SERPL-SCNC: 28 MMOL/L (ref 21–32)
COMPLEXED PSA SERPL-MCNC: 2.9 NG/ML (ref ?–4)
CREAT BLD-MCNC: 1.24 MG/DL
EGFRCR SERPLBLD CKD-EPI 2021: 66 ML/MIN/1.73M2 (ref 60–?)
EOSINOPHIL # BLD AUTO: 0.14 X10(3) UL (ref 0–0.7)
EOSINOPHIL NFR BLD AUTO: 1.9 %
ERYTHROCYTE [DISTWIDTH] IN BLOOD BY AUTOMATED COUNT: 12.7 %
FASTING PATIENT LIPID ANSWER: YES
FASTING STATUS PATIENT QL REPORTED: YES
GLOBULIN PLAS-MCNC: 3.3 G/DL (ref 2.8–4.4)
GLUCOSE BLD-MCNC: 109 MG/DL (ref 70–99)
HCT VFR BLD AUTO: 45.7 %
HDLC SERPL-MCNC: 49 MG/DL (ref 40–59)
HGB BLD-MCNC: 15.8 G/DL
IMM GRANULOCYTES # BLD AUTO: 0.01 X10(3) UL (ref 0–1)
IMM GRANULOCYTES NFR BLD: 0.1 %
LDLC SERPL CALC-MCNC: 188 MG/DL (ref ?–100)
LYMPHOCYTES # BLD AUTO: 2.21 X10(3) UL (ref 1–4)
LYMPHOCYTES NFR BLD AUTO: 30.3 %
MCH RBC QN AUTO: 31.8 PG (ref 26–34)
MCHC RBC AUTO-ENTMCNC: 34.6 G/DL (ref 31–37)
MCV RBC AUTO: 92 FL
MONOCYTES # BLD AUTO: 0.64 X10(3) UL (ref 0.1–1)
MONOCYTES NFR BLD AUTO: 8.8 %
NEUTROPHILS # BLD AUTO: 4.27 X10 (3) UL (ref 1.5–7.7)
NEUTROPHILS # BLD AUTO: 4.27 X10(3) UL (ref 1.5–7.7)
NEUTROPHILS NFR BLD AUTO: 58.6 %
NONHDLC SERPL-MCNC: 201 MG/DL (ref ?–130)
OSMOLALITY SERPL CALC.SUM OF ELEC: 288 MOSM/KG (ref 275–295)
PLATELET # BLD AUTO: 213 10(3)UL (ref 150–450)
POTASSIUM SERPL-SCNC: 4.4 MMOL/L (ref 3.5–5.1)
PROT SERPL-MCNC: 6.9 G/DL (ref 6.4–8.2)
RBC # BLD AUTO: 4.97 X10(6)UL
SODIUM SERPL-SCNC: 137 MMOL/L (ref 136–145)
T4 FREE SERPL-MCNC: 1.1 NG/DL (ref 0.8–1.7)
TRIGL SERPL-MCNC: 77 MG/DL (ref 30–149)
TSI SER-ACNC: 1.54 MIU/ML (ref 0.36–3.74)
VLDLC SERPL CALC-MCNC: 16 MG/DL (ref 0–30)
WBC # BLD AUTO: 7.3 X10(3) UL (ref 4–11)

## 2024-03-16 PROCEDURE — 80061 LIPID PANEL: CPT | Performed by: FAMILY MEDICINE

## 2024-03-16 PROCEDURE — 84443 ASSAY THYROID STIM HORMONE: CPT | Performed by: FAMILY MEDICINE

## 2024-03-16 PROCEDURE — 80053 COMPREHEN METABOLIC PANEL: CPT | Performed by: FAMILY MEDICINE

## 2024-03-16 PROCEDURE — 71046 X-RAY EXAM CHEST 2 VIEWS: CPT | Performed by: FAMILY MEDICINE

## 2024-03-16 PROCEDURE — 84439 ASSAY OF FREE THYROXINE: CPT | Performed by: FAMILY MEDICINE

## 2024-03-16 PROCEDURE — 85025 COMPLETE CBC W/AUTO DIFF WBC: CPT | Performed by: FAMILY MEDICINE

## 2024-03-16 PROCEDURE — 83036 HEMOGLOBIN GLYCOSYLATED A1C: CPT | Performed by: FAMILY MEDICINE

## 2024-03-16 NOTE — PROGRESS NOTES
Here for NV blood draw    1 lavender and 1 mint tube drawn from right AC with butterfly needle    Patient tolerated well and left office in stable condition

## 2024-03-18 ENCOUNTER — TELEPHONE (OUTPATIENT)
Dept: FAMILY MEDICINE CLINIC | Facility: CLINIC | Age: 64
End: 2024-03-18

## 2024-03-18 DIAGNOSIS — R73.09 ELEVATED GLUCOSE: Primary | ICD-10-CM

## 2024-03-18 LAB
EST. AVERAGE GLUCOSE BLD GHB EST-MCNC: 120 MG/DL (ref 68–126)
HBA1C MFR BLD: 5.8 % (ref ?–5.7)

## 2024-03-18 NOTE — TELEPHONE ENCOUNTER
----- Message from Hossein Evans DO sent at 3/18/2024  9:20 AM CDT -----  Emile , your chest xray came back negative

## 2024-03-22 ENCOUNTER — PATIENT MESSAGE (OUTPATIENT)
Dept: FAMILY MEDICINE CLINIC | Facility: CLINIC | Age: 64
End: 2024-03-22

## 2024-03-22 DIAGNOSIS — R97.20 ELEVATED PSA: Primary | ICD-10-CM

## 2024-03-22 DIAGNOSIS — E78.2 HYPERLIPIDEMIA, MIXED: ICD-10-CM

## 2024-03-22 NOTE — TELEPHONE ENCOUNTER
From: Emile Hung  To: Hossein Evans  Sent: 3/22/2024 1:35 PM CDT  Subject: Test results     DrKumar   Yes I have to pee 3-4 times per as well as more during the day now then in the past, also urgency is an issue.   As far as the cholesterol medication I can try a low dose if feel that's the route to go however when I was on mevacore I didn't have the feet and leg pains like this one.  Whatever you think is best is ok with me.    Thanks very much

## 2024-03-23 RX ORDER — SULFAMETHOXAZOLE AND TRIMETHOPRIM 800; 160 MG/1; MG/1
1 TABLET ORAL 2 TIMES DAILY
Qty: 28 TABLET | Refills: 0 | Status: SHIPPED | OUTPATIENT
Start: 2024-03-23 | End: 2024-04-06

## 2024-03-23 RX ORDER — LOVASTATIN 20 MG/1
20 TABLET ORAL NIGHTLY
Qty: 30 TABLET | Refills: 5 | Status: SHIPPED | OUTPATIENT
Start: 2024-03-23

## 2024-03-25 DIAGNOSIS — M54.16 LUMBAR RADICULOPATHY: ICD-10-CM

## 2024-03-25 DIAGNOSIS — G43.009 MIGRAINE WITHOUT AURA AND WITHOUT STATUS MIGRAINOSUS, NOT INTRACTABLE: ICD-10-CM

## 2024-03-25 DIAGNOSIS — M47.816 LUMBAR SPONDYLOSIS: ICD-10-CM

## 2024-03-25 RX ORDER — HYDROCODONE BITARTRATE AND ACETAMINOPHEN 7.5; 325 MG/1; MG/1
TABLET ORAL
Qty: 180 TABLET | Refills: 0 | Status: SHIPPED | OUTPATIENT
Start: 2024-03-25

## 2024-03-25 RX ORDER — CARISOPRODOL 350 MG/1
TABLET ORAL
Qty: 120 TABLET | Refills: 1 | Status: SHIPPED | OUTPATIENT
Start: 2024-03-25

## 2024-03-25 NOTE — TELEPHONE ENCOUNTER
Patient requests refill    HYDROcodone-acetaminophen 7.5-325 MG Oral Tab     carisoprodol 350 MG Oral Tab     [facility]

## 2024-04-04 ENCOUNTER — OFFICE VISIT (OUTPATIENT)
Dept: SLEEP CENTER | Age: 64
End: 2024-04-04
Attending: INTERNAL MEDICINE
Payer: COMMERCIAL

## 2024-04-04 DIAGNOSIS — Z86.79 HISTORY OF ATRIAL FIBRILLATION: ICD-10-CM

## 2024-04-04 DIAGNOSIS — R06.81 APNEA: ICD-10-CM

## 2024-04-04 PROCEDURE — 95806 SLEEP STUDY UNATT&RESP EFFT: CPT

## 2024-04-10 ENCOUNTER — PATIENT MESSAGE (OUTPATIENT)
Dept: FAMILY MEDICINE CLINIC | Facility: CLINIC | Age: 64
End: 2024-04-10

## 2024-04-10 DIAGNOSIS — I48.0 PAROXYSMAL ATRIAL FIBRILLATION (HCC): Primary | ICD-10-CM

## 2024-04-10 NOTE — TELEPHONE ENCOUNTER
From: Emile Hung  To: Hossein Evans  Sent: 4/10/2024 6:52 AM CDT  Subject: Cardiologist      , I've tried for 3+ weeks now and can't get an appointment to see the cardiologist, they started by telling me that I could not switch dr in the same clinic, then I was told I could if both Dr's approved the switch. After no call back as promised my call was greeted with we have no record of my request or notes to the Dr's so I started over with the same response of we don't allow you to see anyone else once you see a dr. They told me they would enter the request in the system and call me back once the dr's review. Been over a week and again no call. I'm done with these people !!!   If you want to refer me to another group fine with me otherwise I'll pick one on my own.    Thanks

## 2024-04-11 ENCOUNTER — SLEEP STUDY (OUTPATIENT)
Facility: CLINIC | Age: 64
End: 2024-04-11
Payer: COMMERCIAL

## 2024-04-11 DIAGNOSIS — G47.30 SLEEP APNEA, UNSPECIFIED TYPE: Primary | ICD-10-CM

## 2024-04-11 PROCEDURE — 95806 SLEEP STUDY UNATT&RESP EFFT: CPT | Performed by: INTERNAL MEDICINE

## 2024-04-19 ENCOUNTER — PATIENT MESSAGE (OUTPATIENT)
Dept: FAMILY MEDICINE CLINIC | Facility: CLINIC | Age: 64
End: 2024-04-19

## 2024-04-24 DIAGNOSIS — M47.816 LUMBAR SPONDYLOSIS: ICD-10-CM

## 2024-04-24 DIAGNOSIS — G43.009 MIGRAINE WITHOUT AURA AND WITHOUT STATUS MIGRAINOSUS, NOT INTRACTABLE: ICD-10-CM

## 2024-04-24 DIAGNOSIS — M54.16 LUMBAR RADICULOPATHY: ICD-10-CM

## 2024-04-24 RX ORDER — HYDROCODONE BITARTRATE AND ACETAMINOPHEN 7.5; 325 MG/1; MG/1
TABLET ORAL
Qty: 180 TABLET | Refills: 0 | Status: SHIPPED | OUTPATIENT
Start: 2024-04-24

## 2024-04-24 RX ORDER — CARISOPRODOL 350 MG/1
TABLET ORAL
Qty: 120 TABLET | Refills: 1 | Status: SHIPPED | OUTPATIENT
Start: 2024-04-24

## 2024-04-24 NOTE — TELEPHONE ENCOUNTER
Pt is requesting a refill on the following prescription(s):    Med: norco & carisoprodol 350 mg  Ph[facility]acy: [facility] [place] [facility]     Last refill: 3/25/34 for both  Last OV: 3/13/24  Last px: 3/13/24    Please advise, thank you!

## 2024-05-20 DIAGNOSIS — G43.009 MIGRAINE WITHOUT AURA AND WITHOUT STATUS MIGRAINOSUS, NOT INTRACTABLE: ICD-10-CM

## 2024-05-20 DIAGNOSIS — M54.16 LUMBAR RADICULOPATHY: ICD-10-CM

## 2024-05-20 DIAGNOSIS — M47.816 LUMBAR SPONDYLOSIS: ICD-10-CM

## 2024-05-21 RX ORDER — HYDROCODONE BITARTRATE AND ACETAMINOPHEN 7.5; 325 MG/1; MG/1
TABLET ORAL
Qty: 180 TABLET | Refills: 0 | Status: SHIPPED | OUTPATIENT
Start: 2024-05-21

## 2024-06-20 DIAGNOSIS — M47.816 LUMBAR SPONDYLOSIS: ICD-10-CM

## 2024-06-20 DIAGNOSIS — M54.16 LUMBAR RADICULOPATHY: ICD-10-CM

## 2024-06-20 DIAGNOSIS — G43.009 MIGRAINE WITHOUT AURA AND WITHOUT STATUS MIGRAINOSUS, NOT INTRACTABLE: ICD-10-CM

## 2024-06-20 RX ORDER — CARISOPRODOL 350 MG/1
TABLET ORAL
Qty: 120 TABLET | Refills: 1 | Status: SHIPPED | OUTPATIENT
Start: 2024-06-20

## 2024-06-20 RX ORDER — HYDROCODONE BITARTRATE AND ACETAMINOPHEN 7.5; 325 MG/1; MG/1
TABLET ORAL
Qty: 180 TABLET | Refills: 0 | Status: SHIPPED | OUTPATIENT
Start: 2024-06-20

## 2024-06-20 NOTE — TELEPHONE ENCOUNTER
[facility] #[id number] - [place], [place] - [address] [facility] AT [facility] & [facility] ([address]), [phone], [phone]   [address] [facility] [address]   Phone: [phone] Fax: [phone]   Hours: Not open 24 hours       PATIENT REQUESTING REFILL FOR         HYDROcodone-acetaminophen 7.5-325 MG Oral Tab 180 tablet 0 2024 --   Si-2 po qid prn pain            carisoprodol 350 MG Oral Tab 120 tablet 1 2024 --   Sig:   Take 1 tablet by mouth 3  times daily as needed for muscle spasms

## 2024-07-18 DIAGNOSIS — G43.009 MIGRAINE WITHOUT AURA AND WITHOUT STATUS MIGRAINOSUS, NOT INTRACTABLE: ICD-10-CM

## 2024-07-18 DIAGNOSIS — M47.816 LUMBAR SPONDYLOSIS: ICD-10-CM

## 2024-07-18 DIAGNOSIS — M54.16 LUMBAR RADICULOPATHY: ICD-10-CM

## 2024-07-19 RX ORDER — HYDROCODONE BITARTRATE AND ACETAMINOPHEN 7.5; 325 MG/1; MG/1
TABLET ORAL
Qty: 180 TABLET | Refills: 0 | Status: SHIPPED | OUTPATIENT
Start: 2024-07-19

## 2024-07-19 NOTE — TELEPHONE ENCOUNTER
LOV 03/13/24  Last labs 03/16/24  Last refill on 06/20/24, for #180 tabs, with 0 refills  HYDROcodone-acetaminophen 7.5-325 MG Oral Tab     No future appointments.    Order(s) pending, please review. Thank you.

## 2024-08-16 DIAGNOSIS — M54.16 LUMBAR RADICULOPATHY: ICD-10-CM

## 2024-08-16 DIAGNOSIS — M47.816 LUMBAR SPONDYLOSIS: ICD-10-CM

## 2024-08-16 DIAGNOSIS — G43.009 MIGRAINE WITHOUT AURA AND WITHOUT STATUS MIGRAINOSUS, NOT INTRACTABLE: ICD-10-CM

## 2024-08-16 NOTE — TELEPHONE ENCOUNTER
Hydrocodone - acetaminophen 7.5-325 mg    Last time medication was refilled 7/19/24  Quantity and number of refills 180 w/ 0  Last OV 3/13/24  Next OV none scheduled   Labs:  Expected around 7/23/24      Carisoprodol 350 mg  Last time medication was refilled 6/20/24  Quantity and number of refills 120 w/ 0  Last OV 3/13/24  Next OV none scheduled     My chart message sent to patient reminding to complete labs.      Alert and oriented, no focal deficits, no motor or sensory deficits.

## 2024-08-16 NOTE — TELEPHONE ENCOUNTER
[facility] #[id number] - [place], [place] - [address] AT [facility] OF [facility] ([address]), [phone], [phone]   [address] [address] [facility] [address]   Phone: [phone] Fax: [phone]       PATIENT CALLING THIS MORNING REQUESTING REFILL     HYDROcodone-acetaminophen 7.5-325 MG Oral Tab 180 tablet 0 2024 --   Si-2 po qid prn pain     Route:   (none)           carisoprodol 350 MG Oral Tab 120 tablet 1 2024 --   Sig:   Take 1 tablet by mouth 3  times daily as needed for muscle spasms     Route:   (none)     Note to Pharmacy:   Provider okay with early

## 2024-08-18 RX ORDER — HYDROCODONE BITARTRATE AND ACETAMINOPHEN 7.5; 325 MG/1; MG/1
TABLET ORAL
Qty: 180 TABLET | Refills: 0 | Status: SHIPPED | OUTPATIENT
Start: 2024-08-18

## 2024-08-18 RX ORDER — CARISOPRODOL 350 MG/1
TABLET ORAL
Qty: 120 TABLET | Refills: 1 | Status: SHIPPED | OUTPATIENT
Start: 2024-08-18

## 2024-08-24 ENCOUNTER — NURSE ONLY (OUTPATIENT)
Dept: FAMILY MEDICINE CLINIC | Facility: CLINIC | Age: 64
End: 2024-08-24
Payer: COMMERCIAL

## 2024-08-24 DIAGNOSIS — R97.20 ELEVATED PSA: ICD-10-CM

## 2024-08-24 DIAGNOSIS — E78.2 HYPERLIPIDEMIA, MIXED: ICD-10-CM

## 2024-08-24 LAB
ALBUMIN SERPL-MCNC: 4.4 G/DL (ref 3.2–4.8)
ALBUMIN/GLOB SERPL: 1.6 {RATIO} (ref 1–2)
ALP LIVER SERPL-CCNC: 70 U/L
ALT SERPL-CCNC: 41 U/L
ANION GAP SERPL CALC-SCNC: 5 MMOL/L (ref 0–18)
AST SERPL-CCNC: 27 U/L (ref ?–34)
BILIRUB SERPL-MCNC: 0.5 MG/DL (ref 0.2–1.1)
BUN BLD-MCNC: 19 MG/DL (ref 9–23)
CALCIUM BLD-MCNC: 10 MG/DL (ref 8.7–10.4)
CHLORIDE SERPL-SCNC: 105 MMOL/L (ref 98–112)
CHOLEST SERPL-MCNC: 216 MG/DL (ref ?–200)
CO2 SERPL-SCNC: 29 MMOL/L (ref 21–32)
COMPLEXED PSA SERPL-MCNC: 2.6 NG/ML (ref ?–4)
CREAT BLD-MCNC: 1.07 MG/DL
EGFRCR SERPLBLD CKD-EPI 2021: 78 ML/MIN/1.73M2 (ref 60–?)
FASTING PATIENT LIPID ANSWER: YES
FASTING STATUS PATIENT QL REPORTED: YES
GLOBULIN PLAS-MCNC: 2.8 G/DL (ref 2–3.5)
GLUCOSE BLD-MCNC: 91 MG/DL (ref 70–99)
HDLC SERPL-MCNC: 54 MG/DL (ref 40–59)
LDLC SERPL CALC-MCNC: 152 MG/DL (ref ?–100)
NONHDLC SERPL-MCNC: 162 MG/DL (ref ?–130)
OSMOLALITY SERPL CALC.SUM OF ELEC: 290 MOSM/KG (ref 275–295)
POTASSIUM SERPL-SCNC: 4 MMOL/L (ref 3.5–5.1)
PROT SERPL-MCNC: 7.2 G/DL (ref 5.7–8.2)
SODIUM SERPL-SCNC: 139 MMOL/L (ref 136–145)
TRIGL SERPL-MCNC: 59 MG/DL (ref 30–149)
VLDLC SERPL CALC-MCNC: 11 MG/DL (ref 0–30)

## 2024-08-24 PROCEDURE — 80061 LIPID PANEL: CPT | Performed by: FAMILY MEDICINE

## 2024-08-24 PROCEDURE — 80053 COMPREHEN METABOLIC PANEL: CPT | Performed by: FAMILY MEDICINE

## 2024-08-24 NOTE — PROGRESS NOTES
Emile Hung present in office for nurse visit.  Labs as ordered by Dr. Evans; 21 g, Left AC, green tube, and gold tube     All questions/concerns addressed. Patient left in stable condition.

## 2024-08-25 ENCOUNTER — PATIENT MESSAGE (OUTPATIENT)
Dept: FAMILY MEDICINE CLINIC | Facility: CLINIC | Age: 64
End: 2024-08-25

## 2024-08-25 DIAGNOSIS — E78.2 HYPERLIPIDEMIA, MIXED: Primary | ICD-10-CM

## 2024-08-26 NOTE — TELEPHONE ENCOUNTER
From: Emile Hung  Sent: 8/25/2024 12:19 PM CDT  To: Chris Whittaker Clinical Staff  Subject: LABS    Sure, I'll start today    Thanks

## 2024-09-08 ENCOUNTER — PATIENT MESSAGE (OUTPATIENT)
Dept: FAMILY MEDICINE CLINIC | Facility: CLINIC | Age: 64
End: 2024-09-08

## 2024-09-09 RX ORDER — LOVASTATIN 40 MG
40 TABLET ORAL NIGHTLY
Qty: 90 TABLET | Refills: 2 | Status: SHIPPED | OUTPATIENT
Start: 2024-09-09

## 2024-09-09 NOTE — TELEPHONE ENCOUNTER
From: Emile Hung  To: Hossein Evans  Sent: 9/8/2024 12:34 PM CDT  Subject: Lovastatin    Dr. LYN went up to 40mg as suggested, when you get a chance please send a new prescription as I'm now out and don't have any refills.     Thanks very much

## 2024-09-16 DIAGNOSIS — G43.009 MIGRAINE WITHOUT AURA AND WITHOUT STATUS MIGRAINOSUS, NOT INTRACTABLE: ICD-10-CM

## 2024-09-16 DIAGNOSIS — M47.816 LUMBAR SPONDYLOSIS: ICD-10-CM

## 2024-09-16 DIAGNOSIS — M54.16 LUMBAR RADICULOPATHY: ICD-10-CM

## 2024-09-16 RX ORDER — HYDROCODONE BITARTRATE AND ACETAMINOPHEN 7.5; 325 MG/1; MG/1
TABLET ORAL
Qty: 180 TABLET | Refills: 0 | Status: SHIPPED | OUTPATIENT
Start: 2024-09-16

## 2024-09-16 NOTE — TELEPHONE ENCOUNTER
PATIENT CALLING TO REQUEST REFILL ON HYDROcodone-acetaminophen 7.5-325 MG Oral Tab.    WILL BE GOING OUT OF TOWN ON [place]ED[place] MORNING. WOULD LIKE TO  MEDICATION TOMORROW.     [facility] #[id number] - [place] [address] AT [facility] ([address]), [phone], [phone] [[id number]]    Patient discussed on morning rounds with Dr. Fry    Operation / Date: 5/14/21 BAV    SUBJECTIVE ASSESSMENT:  89y Female seen and examined at bedside.  Patient with no complaints.        Vital Signs Last 24 Hrs  T(C): 36.7 (17 May 2021 08:30), Max: 36.7 (17 May 2021 05:31)  T(F): 98 (17 May 2021 08:30), Max: 98.1 (17 May 2021 05:31)  HR: 76 (17 May 2021 08:30) (63 - 76)  BP: 105/51 (17 May 2021 08:30) (105/51 - 134/59)  BP(mean): 85 (16 May 2021 18:51) (74 - 85)  RR: 17 (17 May 2021 08:30) (14 - 18)  SpO2: 95% (17 May 2021 08:30) (93% - 99%)  I&O's Detail    16 May 2021 07:01  -  17 May 2021 07:00  --------------------------------------------------------  IN:    IV PiggyBack: 100 mL    Lactated Ringers: 250 mL    Lactated Ringers: 720 mL  Total IN: 1070 mL    OUT:    Estimated Blood Loss (mL): 250 mL    Indwelling Catheter - Urethral (mL): 940 mL  Total OUT: 1190 mL    Total NET: -120 mL      17 May 2021 07:01  -  17 May 2021 16:52  --------------------------------------------------------  IN:    Lactated Ringers: 300 mL    Oral Fluid: 360 mL  Total IN: 660 mL    OUT:  Total OUT: 0 mL    Total NET: 660 mL    PHYSICAL EXAM:    GEN: NAD, looks comfortable  Psych: Mood appropriate  Neuro: A&Ox3.  No focal deficits.  Moving all extremities.   HEENT: No obvious abnormalities  CV: S1S2, regular, no murmurs appreciated.  No carotid bruits.  No JVD  Lungs: Clear B/L.  No wheezing, rales or rhonchi  ABD: Soft, non-tender, non-distended.  +Bowel sounds  EXT: Warm and well perfused.  No peripheral edema noted  Musculoskeletal: Moving all extremities with normal ROM, no joint swelling, groin soft without hematoma  PV: Pedal pulses palpable     LABS:                        10.3   9.88  )-----------( 217      ( 17 May 2021 07:37 )             32.3       COUMADIN: No. REASON: .        05-17    145  |  108  |  21  ----------------------------<  124<H>  4.4   |  21<L>  |  0.67    Ca    8.0<L>      17 May 2021 07:37            MEDICATIONS  (STANDING):  acetaminophen   Tablet .. 1000 milliGRAM(s) Oral every 8 hours  aspirin enteric coated 325 milliGRAM(s) Oral two times a day  atorvastatin 10 milliGRAM(s) Oral at bedtime  BUpivacaine liposome 1.3% Injectable (no eMAR) 20 milliLiter(s) Local Injection once  HYDROmorphone  Injectable 0.5 milliGRAM(s) IV Push once  lactated ringers. 1000 milliLiter(s) (60 mL/Hr) IV Continuous <Continuous>  pantoprazole  Injectable 40 milliGRAM(s) IV Push daily  polyethylene glycol 3350 17 Gram(s) Oral at bedtime  povidone iodine 5% Nasal Swab 1 Application(s) Both Nostrils once  senna 2 Tablet(s) Oral at bedtime    MEDICATIONS  (PRN):  bisacodyl Suppository 10 milliGRAM(s) Rectal daily PRN Constipation  magnesium hydroxide Suspension 30 milliLiter(s) Oral daily PRN Constipation  ondansetron Injectable 4 milliGRAM(s) IV Push every 6 hours PRN Nausea and/or Vomiting  traMADol 50 milliGRAM(s) Oral every 4 hours PRN Severe Pain (7 - 10)  traMADol 25 milliGRAM(s) Oral every 6 hours PRN Moderate Pain (4 - 6)        RADIOLOGY & ADDITIONAL TESTS:

## 2024-10-16 DIAGNOSIS — M47.816 LUMBAR SPONDYLOSIS: ICD-10-CM

## 2024-10-16 DIAGNOSIS — M54.16 LUMBAR RADICULOPATHY: ICD-10-CM

## 2024-10-16 DIAGNOSIS — G43.009 MIGRAINE WITHOUT AURA AND WITHOUT STATUS MIGRAINOSUS, NOT INTRACTABLE: ICD-10-CM

## 2024-10-16 RX ORDER — CARISOPRODOL 350 MG/1
TABLET ORAL
Qty: 120 TABLET | Refills: 1 | Status: SHIPPED | OUTPATIENT
Start: 2024-10-16

## 2024-10-16 RX ORDER — HYDROCODONE BITARTRATE AND ACETAMINOPHEN 7.5; 325 MG/1; MG/1
TABLET ORAL
Qty: 180 TABLET | Refills: 0 | Status: SHIPPED | OUTPATIENT
Start: 2024-10-16

## 2024-10-16 NOTE — TELEPHONE ENCOUNTER
[facility] #[id number] - [place], [place] - [address] AT [facility] OF [facility] ([address]), [phone], [phone]   [address] [facility] [address] [facility] [address]   Phone: [phone] Fax: [phone]       PATIENT CALLING THIS MORNING REQUESTING REFILLS     HYDROcodone-acetaminophen 7.5-325 MG Oral [id number]--Si-2 po qid prn painRoute:   (none)Earliest Fill Date:   2024Order #:   [id number]     carisoprodol 350 MG Oral Tab 120 tablet 1 2024 --   Sig:   Take 1 tablet by mouth 3  times daily as needed for muscle spasms     Route:   (none)     Note to Pharmacy:   Provider okay with early      Order #:   [id number]

## 2024-11-13 ENCOUNTER — TELEPHONE (OUTPATIENT)
Dept: FAMILY MEDICINE CLINIC | Facility: CLINIC | Age: 64
End: 2024-11-13

## 2024-11-13 DIAGNOSIS — G43.009 MIGRAINE WITHOUT AURA AND WITHOUT STATUS MIGRAINOSUS, NOT INTRACTABLE: ICD-10-CM

## 2024-11-13 DIAGNOSIS — M54.16 LUMBAR RADICULOPATHY: ICD-10-CM

## 2024-11-13 DIAGNOSIS — M47.816 LUMBAR SPONDYLOSIS: ICD-10-CM

## 2024-11-13 RX ORDER — HYDROCODONE BITARTRATE AND ACETAMINOPHEN 7.5; 325 MG/1; MG/1
TABLET ORAL
Qty: 180 TABLET | Refills: 0 | Status: SHIPPED | OUTPATIENT
Start: 2024-11-13

## 2024-11-13 NOTE — TELEPHONE ENCOUNTER
Pt needs the following refilled:    HYDROcodone-acetaminophen 7.5-325 MG Oral Tab [[id number]] (Order [id number]     Please send to:  [facility] #[id number] - [place], [place] - [address] AT [facility] ([address]), [phone], [phone]   [address] [facility] [address] [facility] [address]   Phone: [phone] Fax: [phone]   Thank you!

## 2024-11-22 RX ORDER — LISINOPRIL 10 MG/1
10 TABLET ORAL DAILY
Qty: 90 TABLET | Refills: 2 | Status: SHIPPED | OUTPATIENT
Start: 2024-11-22

## 2024-11-22 NOTE — TELEPHONE ENCOUNTER
Hypertension Medications Protocol [id number]/22/2024 07:26 AM   Protocol Details CMP or BMP in past 12 months    Last BP reading less than 140/90    In person appointment or virtual visit in the past 12 mos or appointment in next 3 mos    EGFRCR or GFRNAA > 50

## 2024-12-10 ENCOUNTER — HOSPITAL ENCOUNTER (OUTPATIENT)
Age: 64
Discharge: HOME | End: 2024-12-10
Payer: COMMERCIAL

## 2024-12-10 VITALS
TEMPERATURE: 98 F | DIASTOLIC BLOOD PRESSURE: 90 MMHG | RESPIRATION RATE: 18 BRPM | SYSTOLIC BLOOD PRESSURE: 150 MMHG | OXYGEN SATURATION: 97 % | HEART RATE: 68 BPM

## 2024-12-10 DIAGNOSIS — M54.16 LUMBAR RADICULOPATHY: ICD-10-CM

## 2024-12-10 DIAGNOSIS — U07.1 COVID-19 VIRUS INFECTION: Primary | ICD-10-CM

## 2024-12-10 DIAGNOSIS — M47.816 LUMBAR SPONDYLOSIS: ICD-10-CM

## 2024-12-10 DIAGNOSIS — G43.009 MIGRAINE WITHOUT AURA AND WITHOUT STATUS MIGRAINOSUS, NOT INTRACTABLE: ICD-10-CM

## 2024-12-10 LAB
POCT INFLUENZA A: NEGATIVE
POCT INFLUENZA B: NEGATIVE
SARS-COV-2 RNA RESP QL NAA+PROBE: DETECTED

## 2024-12-10 PROCEDURE — 87502 INFLUENZA DNA AMP PROBE: CPT | Performed by: NURSE PRACTITIONER

## 2024-12-10 PROCEDURE — U0002 COVID-19 LAB TEST NON-CDC: HCPCS | Performed by: NURSE PRACTITIONER

## 2024-12-10 PROCEDURE — 99204 OFFICE O/P NEW MOD 45 MIN: CPT | Performed by: NURSE PRACTITIONER

## 2024-12-10 RX ORDER — CARISOPRODOL 350 MG/1
TABLET ORAL
Qty: 120 TABLET | Refills: 1 | Status: SHIPPED | OUTPATIENT
Start: 2024-12-10

## 2024-12-10 RX ORDER — BENZONATATE 200 MG/1
200 CAPSULE ORAL 3 TIMES DAILY PRN
Qty: 21 CAPSULE | Refills: 0 | Status: SHIPPED | OUTPATIENT
Start: 2024-12-10 | End: 2024-12-17

## 2024-12-10 RX ORDER — HYDROCODONE BITARTRATE AND ACETAMINOPHEN 7.5; 325 MG/1; MG/1
TABLET ORAL
Qty: 180 TABLET | Refills: 0 | Status: SHIPPED | OUTPATIENT
Start: 2024-12-10

## 2024-12-10 NOTE — TELEPHONE ENCOUNTER
PT CALLED AND ADV NEEDS REFILLS OF     carisoprodol 350 MG Oral Tab     AND    HYDROcodone-acetaminophen 7.5-325 MG Oral Tab     [facility]     THANK YOU

## 2024-12-10 NOTE — ED INITIAL ASSESSMENT (HPI)
Pt with c/o sore throat, nasal and chest congestion with mostly dry cough that started Sunday afternoon.     Pt has been taking Riccola dn Nyquil with temporary improvement

## 2024-12-10 NOTE — ED PROVIDER NOTES
Patient Seen in: [facility]      History     Chief Complaint   Patient presents with    Cough/URI    Sore Throat     Stated Complaint: sore throat, chest and nose congestion    Subjective:   HPI  64-year-old male presents complaining of sore throat, nasal congestion, and cough since Sunday afternoon.  He denies any fever or chills.  He took a home COVID test which was negative.    Objective:     No pertinent past medical history.            No pertinent past surgical history.              No pertinent social history.            Review of Systems   All other systems reviewed and are negative.      Positive for stated complaint: sore throat, chest and nose congestion  Other systems are as noted in HPI.  Constitutional and vital signs reviewed.      All other systems reviewed and negative except as noted above.    Physical Exam     ED Triage Vitals [12/10/24 1111]   /90   Pulse 68   Resp 18   Temp 98.2 °F (36.8 °C)   Temp src Oral   SpO2 97 %   O2 Device None (Room air)       Current Vitals:   Vital Signs  BP: 150/90  Pulse: 68  Resp: 18  Temp: 98.2 °F (36.8 °C)  Temp src: Oral    Oxygen Therapy  SpO2: 97 %  O2 Device: None (Room air)        Physical Exam  Vitals and nursing note reviewed.   Constitutional:       General: He is not in acute distress.     Appearance: He is well-developed. He is not ill-appearing or toxic-appearing.   HENT:      Right Ear: Tympanic membrane, ear canal and external ear normal.      Left Ear: Tympanic membrane, ear canal and external ear normal.      Nose: Congestion present. No rhinorrhea.      Mouth/Throat:      Pharynx: No oropharyngeal exudate or posterior oropharyngeal erythema.   Cardiovascular:      Rate and Rhythm: Normal rate and regular rhythm.      Heart sounds: Normal heart sounds.   Pulmonary:      Effort: Pulmonary effort is normal. No respiratory distress.      Breath sounds: Normal breath sounds. No wheezing.   Skin:     General: Skin is warm and dry.    Neurological:      Mental Status: He is alert and oriented to person, place, and time.             ED Course     Labs Reviewed   RAPID SARS-COV-2 BY PCR - Abnormal; Notable for the following components:       Result Value    Rapid SARS-CoV-2 by PCR Detected (*)     All other components within normal limits   POCT FLU TEST - Normal    Narrative:     This assay is a rapid molecular in vitro test utilizing nucleic acid amplification of influenza A and B viral RNA.                   [facility]     Medical Decision Making  64-year-old male presents complaining of sore throat, nasal congestion, and cough since Sunday afternoon.  He denies any fever or chills.  He took a home COVID test which was negative.    Pertinent Labs & Imaging studies reviewed. (See chart for details).  Patient coming in with viral symptoms.   Differential diagnosis includes but not limited to COVID, flu, pneumonia  Labs reviewed COVID-positive with negative flu.   Will treat for COVID.  Will discharge on Tessalon as needed with Tylenol/Motrin as needed. Patient/Parent is comfortable with this plan.    Overall Pt looks good. Non-toxic, well-hydrated and in no respiratory distress. Vital signs are reassuring. Exam is reassuring. I do not believe pt requires and additional diagnostic studies or intervention. I believe pt can be discharged home to continue evaluation as an outpatient. Follow-up provider given. Discharge instructions given and reviewed. Return for any problems. All understand and agree with the plan.        Problems Addressed:  COVID-19 virus infection: acute illness or injury        Disposition and Plan     Clinical Impression:  1. COVID-19 virus infection         Disposition:  Discharge  12/10/2024 11:37 am    Follow-up:  No follow-up provider specified.        Medications Prescribed:  Discharge Medication List as of 12/10/2024 11:37 AM        START taking these medications    Details   benzonatate 200 MG Oral Cap Take 1 capsule (200 mg  total) by mouth 3 (three) times daily as needed for cough., Normal, Disp-21 capsule, R-0                 Supplementary Documentation:

## 2025-01-02 ENCOUNTER — OFFICE VISIT (OUTPATIENT)
Dept: FAMILY MEDICINE CLINIC | Facility: CLINIC | Age: 65
End: 2025-01-02
Payer: COMMERCIAL

## 2025-01-02 VITALS
TEMPERATURE: 98 F | WEIGHT: 235.38 LBS | RESPIRATION RATE: 18 BRPM | HEART RATE: 80 BPM | DIASTOLIC BLOOD PRESSURE: 68 MMHG | BODY MASS INDEX: 31 KG/M2 | SYSTOLIC BLOOD PRESSURE: 142 MMHG | OXYGEN SATURATION: 99 %

## 2025-01-02 DIAGNOSIS — E86.0 DEHYDRATION: Primary | ICD-10-CM

## 2025-01-02 DIAGNOSIS — N17.9 AKI (ACUTE KIDNEY INJURY) (HCC): ICD-10-CM

## 2025-01-02 DIAGNOSIS — A08.11 NOROVIRUS: ICD-10-CM

## 2025-01-02 LAB
ALBUMIN SERPL-MCNC: 3.7 G/DL (ref 3.2–4.8)
ALBUMIN/GLOB SERPL: 1.4 {RATIO} (ref 1–2)
ALP LIVER SERPL-CCNC: 101 U/L
ALT SERPL-CCNC: 79 U/L
ANION GAP SERPL CALC-SCNC: 8 MMOL/L (ref 0–18)
AST SERPL-CCNC: 35 U/L (ref ?–34)
BILIRUB SERPL-MCNC: 0.3 MG/DL (ref 0.2–1.1)
BUN BLD-MCNC: 17 MG/DL (ref 9–23)
CALCIUM BLD-MCNC: 9 MG/DL (ref 8.7–10.4)
CHLORIDE SERPL-SCNC: 106 MMOL/L (ref 98–112)
CO2 SERPL-SCNC: 24 MMOL/L (ref 21–32)
CREAT BLD-MCNC: 0.93 MG/DL
EGFRCR SERPLBLD CKD-EPI 2021: 93 ML/MIN/1.73M2 (ref 60–?)
FASTING STATUS PATIENT QL REPORTED: NO
GLOBULIN PLAS-MCNC: 2.7 G/DL (ref 2–3.5)
GLUCOSE BLD-MCNC: 93 MG/DL (ref 70–99)
OSMOLALITY SERPL CALC.SUM OF ELEC: 287 MOSM/KG (ref 275–295)
POTASSIUM SERPL-SCNC: 4.5 MMOL/L (ref 3.5–5.1)
PROT SERPL-MCNC: 6.4 G/DL (ref 5.7–8.2)
SODIUM SERPL-SCNC: 138 MMOL/L (ref 136–145)

## 2025-01-02 PROCEDURE — 99214 OFFICE O/P EST MOD 30 MIN: CPT | Performed by: FAMILY MEDICINE

## 2025-01-02 PROCEDURE — 80053 COMPREHEN METABOLIC PANEL: CPT | Performed by: FAMILY MEDICINE

## 2025-01-02 RX ORDER — LOPERAMIDE HYDROCHLORIDE 2 MG/1
1 CAPSULE ORAL 4 TIMES DAILY PRN
COMMUNITY
Start: 2024-12-24

## 2025-01-02 RX ORDER — ONDANSETRON 4 MG/1
1 TABLET, ORALLY DISINTEGRATING ORAL EVERY 8 HOURS PRN
COMMUNITY
Start: 2024-12-24

## 2025-01-02 NOTE — PROGRESS NOTES
Emile Hung is a 64 year old male.  HPI:   Was at a  and over the course of  the next few days, pretty much everyone developed vomiting and diarrhea, ended up in the ER for dehydration   Current Outpatient Medications   Medication Sig Dispense Refill    loperamide 2 MG Oral Cap Take 1 capsule (2 mg total) by mouth 4 (four) times daily as needed.      ondansetron 4 MG Oral Tablet Dispersible Take 1 tablet (4 mg total) by mouth every 8 (eight) hours as needed.      HYDROcodone-acetaminophen 7.5-325 MG Oral Tab 1-2 po qid prn pain 180 tablet 0    carisoprodol 350 MG Oral Tab Take 1 tablet by mouth 3  times daily as needed for muscle spasms 120 tablet 1    LISINOPRIL 10 MG Oral Tab TAKE 1 TABLET(10 MG) BY MOUTH DAILY 90 tablet 2    Lovastatin 40 MG Oral Tab Take 1 tablet (40 mg total) by mouth nightly. 90 tablet 2    B Complex-C-Folic Acid Oral Tab Take by mouth.      Ascorbic Acid (VITAMIN C) 1000 MG Oral Tab Take 4 tablets (4,000 mg total) by mouth daily.      zinc sulfate 220 (50 Zn) MG Oral Cap Take 4 capsules (880 mg total) by mouth daily.      GINSENG KOREAN OR Take by mouth.      Misc Natural Products (NEURIVA) Oral Cap Take by mouth.      Metoprolol Succinate ER 25 MG Oral Tablet 24 Hr       Omega-3 Fatty Acids (FISH OIL OR) Take 2 capsules by mouth daily.      aspirin 81 MG Oral Tab Take 1 tablet (81 mg total) by mouth daily.      MAGNESIUM OR Take 4 tablets by mouth every evening.        Past Medical History:    Anesthesia complication    wakes up combative, violent according to patient    Attention deficit hyperactivity disorder (ADHD)    NO MEDS    Back problem    Depression    AFTER THE BACK SURGERY WITH POOR RESULTS    Diverticulosis of colon (without mention of hemorrhage)    High cholesterol    Hyperlipidemia    Migraines    Neuropathy    left side occurred after surgery    Osteoarthritis    SPINE, KNEES    OTHER DISEASES    hepatitis drug induced    Visual impairment    glasses      Social  History:  Social History     Socioeconomic History    Marital status:     Number of children: 2   Tobacco Use    Smoking status: Former     Current packs/day: 0.00     Average packs/day: 1 pack/day for 5.0 years (5.0 ttl pk-yrs)     Types: Cigarettes     Start date: 3/24/2000     Quit date: 3/24/2005     Years since quittin.7     Passive exposure: Never    Smokeless tobacco: Former    Tobacco comments:     QUIT   Vaping Use    Vaping status: Never Used   Substance and Sexual Activity    Alcohol use: No     Alcohol/week: 0.0 standard drinks of alcohol     Comment: rare    Drug use: No   Other Topics Concern    Caffeine Concern Yes     Comment: coffee/soda    Exercise Yes     Comment: walk     Seat Belt No    Special Diet No    Stress Concern No    Weight Concern No        REVIEW OF SYSTEMS:   GENERAL HEALTH: feels a little better, but still is tired  SKIN: denies any unusual skin lesions or rashes  RESPIRATORY: denies shortness of breath with exertion  CARDIOVASCULAR: denies chest pain on exertion  GI: has some resdiual abdominal pain, has normal stool no further emesis  and denies heartburn  NEURO: denies headaches    EXAM:   BP (!) 168/108   Pulse 80   Temp 98.3 °F (36.8 °C) (Temporal)   Resp 18   Wt 235 lb 6.4 oz (106.8 kg)   SpO2 99%   BMI 31.06 kg/m²   GENERAL: well developed, well nourished,in no apparent distress  SKIN: no rashes,no suspicious lesions  HEENT: atraumatic, normocephalic,ears and throat are clear  NECK: supple,no adenopathy,no bruits  LUNGS: clear to auscultation  CARDIO: RR IRREGULAR?  without murmur  GI: good BS's,no masses, HSM or tenderness  EXTREMITIES: no cyanosis, clubbing or edema    ASSESSMENT AND PLAN:     Encounter Diagnoses   Name Primary?    Dehydration Yes    Norovirus     NAKUL (acute kidney injury) (HCC)        Orders Placed This Encounter   Procedures    Comp Metabolic Panel (14) [E]      MAY NEED TO COME BACK TO RECHECK BP  The patient indicates understanding of  these issues and agrees to the plan.  The patient is asked to return in if sx persist.

## 2025-01-03 ENCOUNTER — PATIENT MESSAGE (OUTPATIENT)
Dept: FAMILY MEDICINE CLINIC | Facility: CLINIC | Age: 65
End: 2025-01-03

## 2025-01-06 DIAGNOSIS — M47.816 LUMBAR SPONDYLOSIS: ICD-10-CM

## 2025-01-06 DIAGNOSIS — M54.16 LUMBAR RADICULOPATHY: ICD-10-CM

## 2025-01-06 DIAGNOSIS — G43.009 MIGRAINE WITHOUT AURA AND WITHOUT STATUS MIGRAINOSUS, NOT INTRACTABLE: ICD-10-CM

## 2025-01-06 NOTE — TELEPHONE ENCOUNTER
Patient requesting Rx    HYDROcodone-acetaminophen 7.5-325 MG Oral Tab     carisoprodol 350 MG Oral Tab     Also, something to help sleep? PCP was waiting for lab results.    Roma Norwood    Please adv  Thank you

## 2025-01-06 NOTE — TELEPHONE ENCOUNTER
HYDROcodone-acetaminophen 7.5-325 MG Oral Tab   Last refill: 12/10/24 #180 [facility]  Be out 4 days     carisoprodol 350 MG Oral Tab   Last refill: 12/10/2024 #120 with 1 refill  Bottle said zero refills    Patient stated Doc was going to prescribe something to help patient sleep at last visit    Last office visit 1/2/25  No pending

## 2025-01-07 RX ORDER — CARISOPRODOL 350 MG/1
TABLET ORAL
Qty: 120 TABLET | Refills: 0 | Status: SHIPPED | OUTPATIENT
Start: 2025-01-07

## 2025-01-07 RX ORDER — HYDROCODONE BITARTRATE AND ACETAMINOPHEN 7.5; 325 MG/1; MG/1
TABLET ORAL
Qty: 180 TABLET | Refills: 0 | Status: SHIPPED | OUTPATIENT
Start: 2025-01-07

## 2025-02-05 DIAGNOSIS — G43.009 MIGRAINE WITHOUT AURA AND WITHOUT STATUS MIGRAINOSUS, NOT INTRACTABLE: ICD-10-CM

## 2025-02-05 DIAGNOSIS — M47.816 LUMBAR SPONDYLOSIS: ICD-10-CM

## 2025-02-05 DIAGNOSIS — M54.16 LUMBAR RADICULOPATHY: ICD-10-CM

## 2025-02-05 RX ORDER — HYDROCODONE BITARTRATE AND ACETAMINOPHEN 7.5; 325 MG/1; MG/1
TABLET ORAL
Qty: 180 TABLET | Refills: 0 | Status: SHIPPED | OUTPATIENT
Start: 2025-02-05

## 2025-02-05 RX ORDER — CARISOPRODOL 350 MG/1
TABLET ORAL
Qty: 120 TABLET | Refills: 0 | Status: SHIPPED | OUTPATIENT
Start: 2025-02-05

## 2025-02-05 NOTE — TELEPHONE ENCOUNTER
[facility] #[id number] - [place], [place] - [address] AT [facility] OF [facility] ([address]), [phone], [phone]   [address] [facility] [facility] [address]   Phone: [phone] Fax: [phone]   Hours: Not open 24 hours               HYDROcodone-acetaminophen 7.5-325 MG Oral Tab 180 tablet 0 2025 --    Si-2 po qid prn pain    Sent to pharmacy as: HYDROcodone-Acetaminophen 7.5-325 MG Oral Tablet (Norco)    Earliest Fill Date: 2025               carisoprodol 350 MG Oral Tab 120 tablet 0 2025 --   Sig:   Take 1 tablet by mouth 3  times daily as needed for muscle spasms     Route:   (none)     Note to Pharmacy:   Provider okay with early

## 2025-03-04 DIAGNOSIS — M54.16 LUMBAR RADICULOPATHY: ICD-10-CM

## 2025-03-04 DIAGNOSIS — M47.816 LUMBAR SPONDYLOSIS: ICD-10-CM

## 2025-03-04 DIAGNOSIS — G43.009 MIGRAINE WITHOUT AURA AND WITHOUT STATUS MIGRAINOSUS, NOT INTRACTABLE: ICD-10-CM

## 2025-03-04 RX ORDER — CARISOPRODOL 350 MG/1
TABLET ORAL
Qty: 120 TABLET | Refills: 0 | Status: SHIPPED | OUTPATIENT
Start: 2025-03-04

## 2025-03-04 RX ORDER — HYDROCODONE BITARTRATE AND ACETAMINOPHEN 7.5; 325 MG/1; MG/1
TABLET ORAL
Qty: 180 TABLET | Refills: 0 | Status: SHIPPED | OUTPATIENT
Start: 2025-03-04

## 2025-03-04 NOTE — TELEPHONE ENCOUNTER
[facility] #[id number] - [place], [place] - [address] AT [facility] OF [facility] ([address]), [phone], [phone]   [address] [facility] [address]   Phone: [phone] Fax: [phone]   Hours: Not open 24 hours       PATIENT REQUESTING REFILL              carisoprodol 350 MG Oral Tab 120 tablet 0 2025 --   Sig:   Take 1 tablet by mouth 3  times daily as needed for muscle spasms     Route:   (none)                HYDROcodone-acetaminophen 7.5-325 MG Oral Tab 180 tablet 0 2025 --   Si-2 po qid prn pain     Route:   (none)

## 2025-03-04 NOTE — TELEPHONE ENCOUNTER
Last OV:01/02/2025 nausea, 03/13/20224 SOB    Last refill:carisoprodol 02/05/2025 120 tabs, 0 refill              Hydrocodone 02/05/2025 180 tabs, 0 refill     No future appointments.    Medication pended, please sign if appropriate

## 2025-04-02 DIAGNOSIS — G43.009 MIGRAINE WITHOUT AURA AND WITHOUT STATUS MIGRAINOSUS, NOT INTRACTABLE: ICD-10-CM

## 2025-04-02 DIAGNOSIS — M54.16 LUMBAR RADICULOPATHY: ICD-10-CM

## 2025-04-02 DIAGNOSIS — M47.816 LUMBAR SPONDYLOSIS: ICD-10-CM

## 2025-04-02 RX ORDER — CARISOPRODOL 350 MG/1
TABLET ORAL
Qty: 120 TABLET | Refills: 0 | Status: SHIPPED | OUTPATIENT
Start: 2025-04-02

## 2025-04-02 RX ORDER — HYDROCODONE BITARTRATE AND ACETAMINOPHEN 7.5; 325 MG/1; MG/1
TABLET ORAL
Qty: 180 TABLET | Refills: 0 | Status: SHIPPED | OUTPATIENT
Start: 2025-04-02

## 2025-04-02 NOTE — TELEPHONE ENCOUNTER
[facility] #[id number] - [place], [place] - [address] AT [facility] OF [facility] ([address]), [phone], [phone]   [address] [address]   Phone: [phone] Fax: [phone]       PATIENT REQUESTING REFILL FOR            carisoprodol 350 MG Oral Tab 120 tablet 0 3/4/2025 --   Sig:   Take 1 tablet by mouth 3  times daily as needed for muscle spasms     Route:   (none)     Note to Pharmacy:   Provider okay with early        HYDROcodone-acetaminophen 7.5-325 MG Oral [id number]--Si-2 po qid prn painRoute:   (none)Earliest Fill Date:   3/4/2025Order #:   [id number]

## 2025-05-01 DIAGNOSIS — G43.009 MIGRAINE WITHOUT AURA AND WITHOUT STATUS MIGRAINOSUS, NOT INTRACTABLE: ICD-10-CM

## 2025-05-01 DIAGNOSIS — M47.816 LUMBAR SPONDYLOSIS: ICD-10-CM

## 2025-05-01 DIAGNOSIS — M54.16 LUMBAR RADICULOPATHY: ICD-10-CM

## 2025-05-01 RX ORDER — HYDROCODONE BITARTRATE AND ACETAMINOPHEN 7.5; 325 MG/1; MG/1
TABLET ORAL
Qty: 180 TABLET | Refills: 0 | Status: SHIPPED | OUTPATIENT
Start: 2025-05-01

## 2025-05-01 NOTE — TELEPHONE ENCOUNTER
Last OV:01/02/2025  Last refill:04/02/2025 180 tabs, 0 refill     Medication pended, please sign if appropriate

## 2025-05-01 NOTE — TELEPHONE ENCOUNTER
PATIENT CALLING- NEEDS REFILL ON HYDROcodone-acetaminophen 7.5-325 MG Oral Tab/     [facility] #[id number] - [place], [place] - [address] AT [facility] OF [facility] [address] ([address]), [phone], [phone]

## 2025-05-12 DIAGNOSIS — G43.009 MIGRAINE WITHOUT AURA AND WITHOUT STATUS MIGRAINOSUS, NOT INTRACTABLE: ICD-10-CM

## 2025-05-12 DIAGNOSIS — M54.16 LUMBAR RADICULOPATHY: ICD-10-CM

## 2025-05-12 DIAGNOSIS — M47.816 LUMBAR SPONDYLOSIS: ICD-10-CM

## 2025-05-13 RX ORDER — CARISOPRODOL 350 MG/1
TABLET ORAL
Qty: 120 TABLET | Refills: 0 | Status: SHIPPED | OUTPATIENT
Start: 2025-05-13

## 2025-05-13 NOTE — TELEPHONE ENCOUNTER
Patient comment: My partial refill of 10 days is now gone so please provide a new refill. Thank you, Emile     Routing to provider per protocol.   carisoprodol 350 MG Oral Tab   Last refilled on 4/2/25 for #120  with 0 rf.   Last labs 1/2/25.   Last seen on 1/2/25.     No future appointments.       Thank you.

## 2025-05-29 ENCOUNTER — TELEPHONE (OUTPATIENT)
Dept: FAMILY MEDICINE CLINIC | Facility: CLINIC | Age: 65
End: 2025-05-29

## 2025-05-29 DIAGNOSIS — M54.16 LUMBAR RADICULOPATHY: ICD-10-CM

## 2025-05-29 DIAGNOSIS — M47.816 LUMBAR SPONDYLOSIS: ICD-10-CM

## 2025-05-29 DIAGNOSIS — G43.009 MIGRAINE WITHOUT AURA AND WITHOUT STATUS MIGRAINOSUS, NOT INTRACTABLE: ICD-10-CM

## 2025-05-29 RX ORDER — HYDROCODONE BITARTRATE AND ACETAMINOPHEN 7.5; 325 MG/1; MG/1
TABLET ORAL
Qty: 180 TABLET | Refills: 0 | Status: SHIPPED | OUTPATIENT
Start: 2025-05-29

## 2025-05-29 NOTE — TELEPHONE ENCOUNTER
Last OV:01/02/2025  Last refill:05/01/2025 180 tabs, 0 refill     Medication pended, please sign if appropriate

## 2025-05-29 NOTE — TELEPHONE ENCOUNTER
Pt states he will be out of the following medication on Sunday.  Can you please refill:    HYDROcodone-acetaminophen 7.5-325 MG Oral Tab [[id number]] (Order [id number]     Please send to:  [id number] #[id number] - [place], [place] - [address] [facility] AT [facility] ([address]), [phone], [phone]   [address] [facility] [address]   Phone: [phone] Fax: [phone]   Thank you!

## 2025-06-09 DIAGNOSIS — M47.816 LUMBAR SPONDYLOSIS: ICD-10-CM

## 2025-06-09 DIAGNOSIS — G43.009 MIGRAINE WITHOUT AURA AND WITHOUT STATUS MIGRAINOSUS, NOT INTRACTABLE: ICD-10-CM

## 2025-06-09 DIAGNOSIS — M54.16 LUMBAR RADICULOPATHY: ICD-10-CM

## 2025-06-09 RX ORDER — LOVASTATIN 40 MG/1
40 TABLET ORAL NIGHTLY
Qty: 90 TABLET | Refills: 2 | Status: SHIPPED | OUTPATIENT
Start: 2025-06-09

## 2025-06-09 NOTE — TELEPHONE ENCOUNTER
Cholesterol Medication Protocol [id number]/09/2025 02:51 PM   Protocol Details ALT < 80    ALT resulted within past year    Lipid panel within past 12 months    In person appointment or virtual visit in the past 12 mos or appointment in next 3 mos    Medication is active on med list     Refilled per protocol  Lovastatin 40 MG Oral Tab   Last refilled on 9/9/24 #90 with 2 rf.  LOV- 1/2/25  Last labs- 8/24/24    Sent to pharmacy

## 2025-06-09 NOTE — TELEPHONE ENCOUNTER
PHARMACY CALLED AND ADV PT IS NEEDING REFILL OF     Lovastatin 40 MG Oral Tab     PLEASE SEND TO [facility] PAUL     THANK YOU

## 2025-06-10 RX ORDER — CARISOPRODOL 350 MG/1
350 TABLET ORAL 3 TIMES DAILY PRN
Qty: 120 TABLET | Refills: 0 | Status: SHIPPED | OUTPATIENT
Start: 2025-06-10

## 2025-06-10 NOTE — TELEPHONE ENCOUNTER
Routing to provider per protocol.   carisoprodol 350 MG Oral Tab   Last refilled on 5/13/25 for #120  with 0 rf.   Last labs 1/2/25.   Last seen on 1/2/25.     No future appointments.       Thank you.

## 2025-06-26 DIAGNOSIS — M47.816 LUMBAR SPONDYLOSIS: ICD-10-CM

## 2025-06-26 DIAGNOSIS — M54.16 LUMBAR RADICULOPATHY: ICD-10-CM

## 2025-06-26 DIAGNOSIS — G43.009 MIGRAINE WITHOUT AURA AND WITHOUT STATUS MIGRAINOSUS, NOT INTRACTABLE: ICD-10-CM

## 2025-06-26 RX ORDER — HYDROCODONE BITARTRATE AND ACETAMINOPHEN 7.5; 325 MG/1; MG/1
TABLET ORAL
Qty: 180 TABLET | Refills: 0 | Status: SHIPPED | OUTPATIENT
Start: 2025-06-26

## 2025-06-26 NOTE — TELEPHONE ENCOUNTER
Last OV:01/02/2025  Last refill:5/29/2025 180 tabs, 0 refill     Medication pended, please sign if appropriate

## 2025-06-26 NOTE — TELEPHONE ENCOUNTER
PT CALLED AND ADV IS NEEDING REFILL OF     HYDROcodone-acetaminophen 7.5-325 MG Oral Tab     PLEASE SEND TO JUSTINA PAUL    THANK YOU

## 2025-07-24 DIAGNOSIS — M54.16 LUMBAR RADICULOPATHY: ICD-10-CM

## 2025-07-24 DIAGNOSIS — M47.816 LUMBAR SPONDYLOSIS: ICD-10-CM

## 2025-07-24 DIAGNOSIS — G43.009 MIGRAINE WITHOUT AURA AND WITHOUT STATUS MIGRAINOSUS, NOT INTRACTABLE: ICD-10-CM

## 2025-07-24 RX ORDER — HYDROCODONE BITARTRATE AND ACETAMINOPHEN 7.5; 325 MG/1; MG/1
TABLET ORAL
Qty: 180 TABLET | Refills: 0 | Status: SHIPPED | OUTPATIENT
Start: 2025-07-24

## 2025-07-24 RX ORDER — CARISOPRODOL 350 MG/1
350 TABLET ORAL 3 TIMES DAILY PRN
Qty: 120 TABLET | Refills: 0 | Status: SHIPPED | OUTPATIENT
Start: 2025-07-24

## 2025-07-24 NOTE — TELEPHONE ENCOUNTER
Last OV:  Last refill: Norco:06/26/2025 180 tabs, 0 refill                 Carisoprodol 06/10/2025, 120 tabs, 0 refill     Medication pended, please sign if appropriate

## 2025-07-24 NOTE — TELEPHONE ENCOUNTER
Patient calling to request refill of HYDROcodone-acetaminophen 7.5-325 MG Oral Tab   AND   CARISOPRODOL 350 MG Oral Tab   [facility] #[id number] [facility] - [address] AT [facility] & [facility] ([address]), [phone], [phone] [[id number]]

## 2025-08-21 DIAGNOSIS — M47.816 LUMBAR SPONDYLOSIS: ICD-10-CM

## 2025-08-21 DIAGNOSIS — G43.009 MIGRAINE WITHOUT AURA AND WITHOUT STATUS MIGRAINOSUS, NOT INTRACTABLE: ICD-10-CM

## 2025-08-21 DIAGNOSIS — M54.16 LUMBAR RADICULOPATHY: ICD-10-CM

## 2025-08-21 RX ORDER — HYDROCODONE BITARTRATE AND ACETAMINOPHEN 7.5; 325 MG/1; MG/1
TABLET ORAL
Qty: 180 TABLET | Refills: 0 | Status: SHIPPED | OUTPATIENT
Start: 2025-08-21

## 2025-08-26 RX ORDER — LISINOPRIL 10 MG/1
10 TABLET ORAL DAILY
Qty: 90 TABLET | Refills: 2 | Status: SHIPPED | OUTPATIENT
Start: 2025-08-26

## (undated) DIAGNOSIS — Q64.4 URACHAL CYST: ICD-10-CM

## (undated) DIAGNOSIS — R19.8 UMBILICAL BLEEDING: ICD-10-CM

## (undated) DIAGNOSIS — G43.009 MIGRAINE WITHOUT AURA AND WITHOUT STATUS MIGRAINOSUS, NOT INTRACTABLE: ICD-10-CM

## (undated) DEVICE — CAPSURE PERMANENT FIXATION SYSTEM 30 PERMANENT FASTENERS: Brand: CAPSURE PERMANENT FIXATION SYSTEM

## (undated) DEVICE — DISSECTOR SONICISION CORDLESS

## (undated) DEVICE — TROCAR: Brand: KII® SLEEVE

## (undated) DEVICE — TROCARS: Brand: KII® BALLOON BLUNT TIP SYSTEM

## (undated) DEVICE — CAUTERY PENCIL

## (undated) DEVICE — GENERAL LAPAROS CDS-LF: Brand: MEDLINE INDUSTRIES, INC.

## (undated) DEVICE — PLUMEPORT ACTIV LAPAROSCOPIC SMOKE FILTRATION DEVICE: Brand: PLUMEPORT ACTIVE

## (undated) DEVICE — Device

## (undated) DEVICE — SUTURE SILK 2-0 SH

## (undated) DEVICE — VIOLET BRAIDED (POLYGLACTIN 910), SYNTHETIC ABSORBABLE SUTURE: Brand: COATED VICRYL

## (undated) DEVICE — TROCAR: Brand: KII SHIELDED BLADED ACCESS SYSTEM

## (undated) DEVICE — CHLORAPREP 26ML APPLICATOR

## (undated) DEVICE — SUTURE MONOCRYL 4-0 PS-2

## (undated) DEVICE — #15 STERILE STAINLESS BLADE: Brand: STERILE STAINLESS BLADES

## (undated) DEVICE — GLOVE BIOGEL M SURG SZ 71/2

## (undated) DEVICE — KENDALL SCD EXPRESS SLEEVES, KNEE LENGTH, MEDIUM: Brand: KENDALL SCD

## (undated) NOTE — MR AVS SNAPSHOT
[facility], [address] [address]               Thank you for choosing us for your health care visit with Donna Goodman. Cameron Chu.   We are glad to serve you and happy to provide you with this EXAMS :If you are breastfeeding and your exam requires an IV Contrast (Gadolinium) injection, you need to stop breastfeeding for 24-48 hours after the procedure.   IF A CHILD is scheduled for this procedure, parents should be advised that they will not be a ? To best provide you care, patients receiving routine medications need to be seen at least once a year.  protocol for controlled substances:  Written prescriptions    ? Written prescriptions must be picked up in office. ?  Please allow the offic Combative, unresponsive    Proparacaine Hcl Shortness of Breath    Combative, unresponsive    Percodan [Oxycodone-Aspirin] Hives                Today's Vital Signs     BP Pulse                144/88 mmHg 64             Current Medications          This li Visit [facility] online at  [email or web address]

## (undated) NOTE — Clinical Note
I saw Deisy Sides today. I think his spleen is borderline large. His lab work up is unremarkable. I think this is likely benign. I would favor just doing an Ultrasound in 6 months to see if the spleen is stable.  He can proceed with the inguinal surgery fr

## (undated) NOTE — MR AVS SNAPSHOT
[address], [address] . [address] [address] [phone]               Thank you for choosing us for your health care visit with Han Diaz DO.   We are glad to serve you and happy to provide you with Иван Mack ? Patient must present photo ID at time of . If a designated family member will be picking up prescription, office must be given name of individual in advance and they must present an ID as well. ?  The name of the person picking up your prescripti Combative, unresponsive    Proparacaine Hcl Shortness of Breath    Combative, unresponsive    Percodan [Oxycodone-Aspirin] Hives                Today's Vital Signs     BP Pulse Height Weight BMI    150/70 mmHg 100 73\" 242 lb 31.93 kg/m2         Current M clinic staff will provide you with the phone number you should call to schedule your appointment.      If you are confident that your benefit plan will not require a referral or authorization, such as [employer], please feel free to schedule your ryan return for a follow-up Blood Pressure Check in 1 month.      Lifestyle Modification Recommendations:    Modification Recommendation   Weight Reduction Maintain normal body weight (body mass index 18.5-24.9 kg/m2)   DASH eating plan Adopt a diet rich in frui

## (undated) NOTE — LETTER
Patient Name: Silvio Sebastian        : 3/12/1960       Medical Record #: [id number]    CONSENT FOR PROCEDURES/SEDATION    Date: 2018       Time: 3:52 PM        1. I authorize the performance upon Silvio Sebastian the following:     Toradol and dexamet

## (undated) NOTE — MR AVS SNAPSHOT
[facility], [address], [address] [place] [address]               Thank you for choosing us for your health care visit with JAZ Carrasquillo.   We are glad to serve you and happy to provide you with this ? Patient must present photo ID at time of . If a designated family member will be picking up prescription, office must be given name of individual in advance and they must present an ID as well. ?  The name of the person picking up your prescripti [address] ([id number])    [address] [facility] [address]   [phone]              Allergies as of Reagan 10, 2017     Novocain Shortness of Breath    Combative, unresponsive    Proparacaine Hcl Shortness of B If you've recently had a stay at the Hospital you can access your discharge instructions in [employer] by going to Visits < Admission Summaries.  If you've been to the Emergency Department or your doctor's office, you can view your past visit information in My

## (undated) NOTE — MR AVS SNAPSHOT
1160 [facility] [address], [phone]               Thank you for choosing us for your health care visit with Estella Olson. Crispin Garcia.   We are glad to serve you and happy to provide you with this family member will be picking up prescription, office must be given name of individual in advance and they must present an ID as well. ? The name of the person picking up your prescription must be documented in your chart.   Scheduling Tests    If your phy Combative, unresponsive    Percodan [Oxycodone-Aspirin] Hives                Today's Vital Signs     BP Pulse                140/80 mmHg 72             Current Medications          This list is accurate as of: 2/13/17  7:07 PM.  Always use your most recen at [phone]. [employer]     Visit [employer]  You can access your MyChart to more actively manage your health care and view more details from this visit by going to [email or web address].   If you've recently had a stay at the Hospital you can

## (undated) NOTE — MR AVS SNAPSHOT
[address] [address]  [phone]               Thank you for choosing us for your health care visit with Vinayak Willingham DO.   We are glad to serve you and happy to provide you with this sum ? Patient must present photo ID at time of . If a designated family member will be picking up prescription, office must be given name of individual in advance and they must present an ID as well. ?  The name of the person picking up your prescripti Your pharmacy should also send any requests electronically to the [facility] office. Follow Up with Our Office     Return in about 6 weeks (around 3/29/2017).       Allergies as of Feb 15, 2017     Novocain Shortness of Breath    Combative, unrespon These medications were sent to [facility] [address], [place] - [address] AT [address] [place] [address], [phone], [phone]  [address]     Phone:  [phone]    - SUMAtriptan Succinate 25 MG Tabs

## (undated) NOTE — MR AVS SNAPSHOT
[address] [address]  [phone]               Thank you for choosing us for your health care visit with Cachorro Rosario DO.   We are glad to serve you and happy to provide you with this sum ? By law, narcotics cannot be faxed or phoned into your pharmacy. The prescription must be signed by the provider, picked up in our office and physically brought to the pharmacy. A 30 day supply with no refills is the maximum allowed.   ? If your prescript Ultimately the patient is responsible for payment. Thank you for your understanding in the matter.            Allergies as of Apr 26, 2017     Novocain Shortness of Breath    Combative, unresponsive    Proparacaine Hcl Shortness of Breath    Combative, un These medications were sent to [place] [id number] [address],  [address] [facility] [address] [place] [address] ([address]), [phone], [phone] [address]     Phone:  [phone]    - SUMAtriptan Succinate 50 MG Tabs

## (undated) NOTE — Clinical Note
Dear Dr. Patel [place],    Thank you very much for the opportunity to see your patient. Below, please find information from my consult for your patient's recent visit. I appreciate the chance to take care of your patient with you.   Please feel free to call me

## (undated) NOTE — LETTER
10/30/19        [address]      Dear Frankie Morales records indicate that you have outstanding lab work and or testing that was ordered for you and has not yet been completed:  Lab Frequency Next Occurrence   LI